# Patient Record
Sex: FEMALE | Race: WHITE | NOT HISPANIC OR LATINO | Employment: OTHER
[De-identification: names, ages, dates, MRNs, and addresses within clinical notes are randomized per-mention and may not be internally consistent; named-entity substitution may affect disease eponyms.]

---

## 2017-07-08 ENCOUNTER — HEALTH MAINTENANCE LETTER (OUTPATIENT)
Age: 62
End: 2017-07-08

## 2018-09-14 ENCOUNTER — TELEPHONE (OUTPATIENT)
Dept: OTHER | Facility: CLINIC | Age: 63
End: 2018-09-14

## 2018-11-26 ENCOUNTER — OFFICE VISIT (OUTPATIENT)
Dept: FAMILY MEDICINE | Facility: CLINIC | Age: 63
End: 2018-11-26
Payer: COMMERCIAL

## 2018-11-26 ENCOUNTER — RADIANT APPOINTMENT (OUTPATIENT)
Dept: GENERAL RADIOLOGY | Facility: CLINIC | Age: 63
End: 2018-11-26
Attending: NURSE PRACTITIONER
Payer: COMMERCIAL

## 2018-11-26 VITALS
BODY MASS INDEX: 26.05 KG/M2 | HEART RATE: 72 BPM | SYSTOLIC BLOOD PRESSURE: 134 MMHG | RESPIRATION RATE: 18 BRPM | TEMPERATURE: 98.5 F | HEIGHT: 63 IN | WEIGHT: 147 LBS | DIASTOLIC BLOOD PRESSURE: 80 MMHG

## 2018-11-26 DIAGNOSIS — Z00.00 ANNUAL PHYSICAL EXAM: Primary | ICD-10-CM

## 2018-11-26 DIAGNOSIS — M19.042 PRIMARY OSTEOARTHRITIS OF LEFT HAND: ICD-10-CM

## 2018-11-26 DIAGNOSIS — M25.541 ARTHRALGIA OF RIGHT HAND: ICD-10-CM

## 2018-11-26 LAB
ALBUMIN SERPL-MCNC: 3.7 G/DL (ref 3.4–5)
ALP SERPL-CCNC: 63 U/L (ref 40–150)
ALT SERPL W P-5'-P-CCNC: 21 U/L (ref 0–50)
ANION GAP SERPL CALCULATED.3IONS-SCNC: 8 MMOL/L (ref 3–14)
AST SERPL W P-5'-P-CCNC: 23 U/L (ref 0–45)
BILIRUB SERPL-MCNC: 0.6 MG/DL (ref 0.2–1.3)
BUN SERPL-MCNC: 8 MG/DL (ref 7–30)
CALCIUM SERPL-MCNC: 8.6 MG/DL (ref 8.5–10.1)
CHLORIDE SERPL-SCNC: 105 MMOL/L (ref 94–109)
CHOLEST SERPL-MCNC: 212 MG/DL
CO2 SERPL-SCNC: 26 MMOL/L (ref 20–32)
CREAT SERPL-MCNC: 0.62 MG/DL (ref 0.52–1.04)
GFR SERPL CREATININE-BSD FRML MDRD: >90 ML/MIN/1.7M2
GLUCOSE SERPL-MCNC: 99 MG/DL (ref 70–99)
HDLC SERPL-MCNC: 119 MG/DL
LDLC SERPL CALC-MCNC: 82 MG/DL
NONHDLC SERPL-MCNC: 93 MG/DL
POTASSIUM SERPL-SCNC: 3.7 MMOL/L (ref 3.4–5.3)
PROT SERPL-MCNC: 7.2 G/DL (ref 6.8–8.8)
SODIUM SERPL-SCNC: 139 MMOL/L (ref 133–144)
T4 FREE SERPL-MCNC: 0.93 NG/DL (ref 0.76–1.46)
TRIGL SERPL-MCNC: 55 MG/DL
TSH SERPL DL<=0.005 MIU/L-ACNC: 4.49 MU/L (ref 0.4–4)

## 2018-11-26 PROCEDURE — 84443 ASSAY THYROID STIM HORMONE: CPT | Performed by: NURSE PRACTITIONER

## 2018-11-26 PROCEDURE — G0124 SCREEN C/V THIN LAYER BY MD: HCPCS | Performed by: NURSE PRACTITIONER

## 2018-11-26 PROCEDURE — 73140 X-RAY EXAM OF FINGER(S): CPT | Mod: RT

## 2018-11-26 PROCEDURE — G0145 SCR C/V CYTO,THINLAYER,RESCR: HCPCS | Performed by: NURSE PRACTITIONER

## 2018-11-26 PROCEDURE — 80053 COMPREHEN METABOLIC PANEL: CPT | Performed by: NURSE PRACTITIONER

## 2018-11-26 PROCEDURE — 36415 COLL VENOUS BLD VENIPUNCTURE: CPT | Performed by: NURSE PRACTITIONER

## 2018-11-26 PROCEDURE — 80061 LIPID PANEL: CPT | Performed by: NURSE PRACTITIONER

## 2018-11-26 PROCEDURE — 84439 ASSAY OF FREE THYROXINE: CPT | Performed by: NURSE PRACTITIONER

## 2018-11-26 PROCEDURE — 87624 HPV HI-RISK TYP POOLED RSLT: CPT | Performed by: NURSE PRACTITIONER

## 2018-11-26 PROCEDURE — 99396 PREV VISIT EST AGE 40-64: CPT | Performed by: NURSE PRACTITIONER

## 2018-11-26 PROCEDURE — 99213 OFFICE O/P EST LOW 20 MIN: CPT | Mod: 25 | Performed by: NURSE PRACTITIONER

## 2018-11-26 ASSESSMENT — ENCOUNTER SYMPTOMS
EYE PAIN: 0
COUGH: 0
HEMATURIA: 0
DIZZINESS: 0
CONSTIPATION: 0
ABDOMINAL PAIN: 0
CHILLS: 0
HEMATOCHEZIA: 0
DIARRHEA: 0

## 2018-11-26 NOTE — MR AVS SNAPSHOT
After Visit Summary   11/26/2018    Levar Sesay    MRN: [id number]           Patient Information     Date Of Birth          1955        Visit Information        Provider Department      11/26/2018 9:00 AM Palak Mckenzie APRN [facility]        Today's Diagnoses     Arthralgia of right hand    -  1    Annual physical exam        Primary osteoarthritis of left hand          Care Instructions      Preventive Health Recommendations  Female Ages 50 - 64    Yearly exam: See your health care provider every year in order to  o Review health changes.   o Discuss preventive care.    o Review your medicines if your doctor has prescribed any.      Get a Pap test every three years (unless you have an abnormal result and your provider advises testing more often).    If you get Pap tests with HPV test, you only need to test every 5 years, unless you have an abnormal result.     You do not need a Pap test if your uterus was removed (hysterectomy) and you have not had cancer.    You should be tested each year for STDs (sexually transmitted diseases) if you're at risk.     Have a mammogram every 1 to 2 years.    Have a colonoscopy at age 50, or have a yearly FIT test (stool test). These exams screen for colon cancer.      Have a cholesterol test every 5 years, or more often if advised.    Have a diabetes test (fasting glucose) every three years. If you are at risk for diabetes, you should have this test more often.     If you are at risk for osteoporosis (brittle bone disease), think about having a bone density scan (DEXA).    Shots: Get a flu shot each year. Get a tetanus shot every 10 years.    Nutrition:     Eat at least 5 servings of fruits and vegetables each day.    Eat whole-grain bread, whole-wheat pasta and brown rice instead of white grains and rice.    Get adequate Calcium and Vitamin D.     Lifestyle    Exercise at least 150 minutes a week (30 minutes a day, 5 days a  week). This will help you control your weight and prevent disease.    Limit alcohol to one drink per day.    No smoking.     Wear sunscreen to prevent skin cancer.     See your dentist every six months for an exam and cleaning.    See your eye doctor every 1 to 2 years.            Follow-ups after your visit        Additional Services     ORTHO  REFERRAL       [facility] is referring you to the Orthopedic  Services at [facility].       The  Representative will assist you in the coordination of your Orthopedic and Musculoskeletal Care as prescribed by your physician.    The  Representative will call you within 1 business day to help schedule your appointment, or you may contact the  Representative at:    All areas ~ [phone]     Type of Referral : Non Surgical / Sport Medicine       Timeframe requested: 3 - 5 days    Coverage of these services is subject to the terms and limitations of your health insurance plan.  Please call member services at your health plan with any benefit or coverage questions.      If X-rays, CT or MRI's have been performed, please contact the facility where they were done to arrange for , prior to your scheduled appointment.  Please bring this referral request to your appointment and present it to your specialist.                  Who to contact     If you have questions or need follow up information about today's clinic visit or your schedule please contact [facility] directly at [phone].  Normal or non-critical lab and imaging results will be communicated to you by MyChart, letter or phone within 4 business days after the clinic has received the results. If you do not hear from us within 7 days, please contact the clinic through MyChart or phone. If you have a critical or abnormal lab result, we will notify you by phone as soon as possible.  Submit refill requests through  "[employer] or call your pharmacy and they will forward the refill request to us. Please allow 3 business days for your refill to be completed.          Additional Information About Your Visit        Care EveryWhere ID     This is your Care EveryWhere ID. This could be used by other organizations to access your [facility] medical records  [id number]        Your Vitals Were     Pulse Temperature Respirations Height Last Period BMI (Body Mass Index)    72 98.5  F (36.9  C) (Tympanic) 18 5' 3\" (1.6 m) 09/15/2003 26.04 kg/m2       Blood Pressure from Last 3 Encounters:   11/26/18 134/80   01/10/07 112/78   07/25/06 114/84    Weight from Last 3 Encounters:   11/26/18 147 lb (66.7 kg)   01/10/07 152 lb (68.9 kg)   07/25/06 160 lb 3.2 oz (72.7 kg)              We Performed the Following     Comprehensive metabolic panel     HPV High Risk Types DNA Cervical     Lipid Profile (Chol, Trig, HDL, LDL calc)     ORTHO  REFERRAL     Pap imaged thin layer screen with HPV - recommended age 30 - 65 years (select HPV order below)     TSH with free T4 reflex          Today's Medication Changes          These changes are accurate as of 11/26/18  9:51 AM.  If you have any questions, ask your nurse or doctor.               Stop taking these medicines if you haven't already. Please contact your care team if you have questions.     metroNIDAZOLE 0.75 % cream   Commonly known as:  METROCREAM   Stopped by:  Palak Mckenzie APRN CNP                    Primary Care Provider Office Phone # Fax #    SHYANNE Stallworth [phone] [address] [place] [address]        Equal Access to Services     [facility]: Hadjay jay Medrano, reymundo mercado, qajeanette shane. So [facility] [phone].    ATENCIÓN: Si habla español, tiene a toribio disposición servicios gratuitos de asistencia lingüística. Llame al [phone].    We comply with applicable federal " civil rights laws and [place] laws. We do not discriminate on the basis of race, color, national origin, age, disability, sex, sexual orientation, or gender identity.            Thank you!     Thank you for choosing [facility]  for your care. Our goal is always to provide you with excellent care. Hearing back from our patients is one way we can continue to improve our services. Please take a few minutes to complete the written survey that you may receive in the mail after your visit with us. Thank you!             Your Updated Medication List - Protect others around you: Learn how to safely use, store and throw away your medicines at www.disposemymeds.org.          This list is accurate as of 11/26/18  9:51 AM.  Always use your most recent med list.                   Brand Name Dispense Instructions for use Diagnosis    CRANBERRY CONCENTRATE PO           MULTIPLE VITAMIN PO      None Entered

## 2018-11-26 NOTE — LETTER
December 4, 2018    Levar Griffinon  [address] [facility] [address]      Dear ,      This letter is in regards to your recent cervical cancer screening (Pap smear and HPV test).    Your Pap smear result was reported as ASCUS or Atypical Squamous Cells of Undetermined Significance. This means that there were mildly abnormal cells found in the sample that we collected from your cervix. The vast majority of patients with this result do not have significant cervical abnormalities.     Your cervical sample was also tested for the presence of Human Papillomavirus (HPV). Your HPV test is NEGATIVE for high risk HPV, meaning that no HPV was found at this time.     Over time, your body can get rid of these abnormal cells, so it is recommended that you repeat your pap and HPV in 3 years.    If you have additional questions regarding this result, please call our registered nurse, Yue at [phone].    Please continue to be seen every year for an annual physical exam and other preventative tests.         Sincerely,    Palak Mckenzie, SHYANNE CNP/rlm

## 2018-11-26 NOTE — PROGRESS NOTES
SUBJECTIVE:   CC: Levar Sesay is an 63 year old woman who presents for preventive health visit.     Physical   Annual:     Getting at least 3 servings of Calcium per day:  NO    Bi-annual eye exam:  NO    Dental care twice a year:  NO    Sleep apnea or symptoms of sleep apnea:  None    Diet:  Regular (no restrictions)    Frequency of exercise:  2-3 days/week    Duration of exercise:  Other    Taking medications regularly:  Yes    Medication side effects:  Other    Additional concerns today:  Yes    Patient has a had a bump in her thumb since August and the joint is sore.      Today's PHQ-2 Score:   PHQ-2 ( 1999 [employer]) 11/26/2018   Q1: Little interest or pleasure in doing things 0   Q2: Feeling down, depressed or hopeless 0   PHQ-2 Score 0   Q1: Little interest or pleasure in doing things Not at all   Q2: Feeling down, depressed or hopeless Not at all   PHQ-2 Score 0       Abuse: Current or Past(Physical, Sexual or Emotional)- No  Do you feel safe in your environment - Yes    Social History   Substance Use Topics     Smoking status: Never Smoker     Smokeless tobacco: Not on file     Alcohol use Yes      Comment: weekends     Alcohol Use 11/26/2018   If you drink alcohol do you typically have greater than 3 drinks per day OR greater than 7 drinks per week? Yes   No flowsheet data found.    Reviewed orders with patient.  Reviewed health maintenance and updated orders accordingly - Yes  Labs reviewed in EPIC  BP Readings from Last 3 Encounters:   11/26/18 134/80   01/10/07 112/78   07/25/06 114/84    Wt Readings from Last 3 Encounters:   11/26/18 147 lb (66.7 kg)   01/10/07 152 lb (68.9 kg)   07/25/06 160 lb 3.2 oz (72.7 kg)                  Patient Active Problem List   Diagnosis     Postmenopausal atrophic vaginitis     MENOPAUSE MENOPAUSAL SYMPTOMS     Rosacea     Past Surgical History:   Procedure Laterality Date     SURGICAL HISTORY OF -   1961    Tonsillectomy     SURGICAL HISTORY OF -   03/31/1986          SURGICAL HISTORY OF -       D & C     SURGICAL HISTORY OF -   2005    carpal tunnel surgery       Social History   Substance Use Topics     Smoking status: Never Smoker     Smokeless tobacco: Never Used     Alcohol use Yes      Comment: weekends     Family History   Problem Relation Age of Onset     Hypertension Mother      Cancer Mother      BCC     Arthritis Mother      unspecified     Eye Disorder Mother      glaucoma, cataract     Cerebrovascular Disease Father      Cancer Father      lung     Respiratory Father      Cancer Maternal Grandmother      Uterine     Alcohol/Drug Maternal Grandfather      Cancer Paternal Grandmother      Uterine     Cancer Sister      Lung     Arthritis Sister      Thyroid Disease Other      Depression Sister          Current Outpatient Prescriptions   Medication Sig Dispense Refill     CRANBERRY CONCENTRATE PO        MULTIPLE VITAMIN OR None Entered       Allergies   Allergen Reactions     Sulfa Drugs      Sulfa       Patient over age 50, mutual decision to screen reflected in health maintenance.    Pertinent mammograms are reviewed under the imaging tab.  History of abnormal Pap smear: NO - age 30-65 PAP every 5 years with negative HPV co-testing recommended  PAP / HPV 2005   PAP NIL ASC-US(A)     Reviewed and updated as needed this visit by clinical staff         Reviewed and updated as needed this visit by Provider        Past Medical History:   Diagnosis Date     Abnormal glandular Papanicolaou smear of cervix 2003    ASCUS; negative HPV      Past Surgical History:   Procedure Laterality Date     SURGICAL HISTORY OF -       Tonsillectomy     SURGICAL HISTORY OF -   1986         SURGICAL HISTORY OF -       D & C     SURGICAL HISTORY OF -   2005    carpal tunnel surgery     Obstetric History     No data available          Review of Systems   Constitutional: Negative for chills.   HENT: Negative for congestion  "and ear pain.    Eyes: Negative for pain.   Respiratory: Negative for cough.    Cardiovascular: Negative for chest pain.   Gastrointestinal: Negative for abdominal pain, constipation, diarrhea and hematochezia.   Genitourinary: Negative for hematuria.   Neurological: Negative for dizziness.      OBJECTIVE:   LMP 09/15/2003                      /80 (BP Location: Right arm, Cuff Size: Adult Regular)  Pulse 72  Temp 98.5  F (36.9  C) (Tympanic)  Resp 18  Ht 5' 3\" (1.6 m)  Wt 147 lb (66.7 kg)  LMP 09/15/2003  BMI 26.04 kg/m2    Physical Exam  GENERAL: healthy, alert and no distress  EYES: Eyes grossly normal to inspection, PERRL and conjunctivae and sclerae normal  HENT: ear canals and TM's normal, nose and mouth without ulcers or lesions  NECK: no adenopathy, no asymmetry, masses, or scars and thyroid normal to palpation  RESP: lungs clear to auscultation - no rales, rhonchi or wheezes  CV: regular rate and rhythm, normal S1 S2, no S3 or S4, no murmur, click or rub, no peripheral edema and peripheral pulses strong  MS: no gross musculoskeletal defects noted, no edema  MS: Finger exam: Tenderness and swelling MPI joint thumb   SKIN: no suspicious lesions or rashes  NEURO: Normal strength and tone, mentation intact and speech normal  PSYCH: mentation appears normal, affect normal/bright    Results for orders placed or performed in visit on 11/26/18   Lipid Profile (Chol, Trig, HDL, LDL calc)   Result Value Ref Range    Cholesterol 212 (H) <200 mg/dL    Triglycerides 55 <150 mg/dL    HDL Cholesterol 119 >49 mg/dL    LDL Cholesterol Calculated 82 <100 mg/dL    Non HDL Cholesterol 93 <130 mg/dL   Comprehensive metabolic panel   Result Value Ref Range    Sodium 139 133 - 144 mmol/L    Potassium 3.7 3.4 - 5.3 mmol/L    Chloride 105 94 - 109 mmol/L    Carbon Dioxide 26 20 - 32 mmol/L    Anion Gap 8 3 - 14 mmol/L    Glucose 99 70 - 99 mg/dL    Urea Nitrogen 8 7 - 30 mg/dL    Creatinine 0.62 0.52 - 1.04 mg/dL    GFR " "Estimate >90 >60 mL/min/1.7m2    GFR Estimate If Black >90 >60 mL/min/1.7m2    Calcium 8.6 8.5 - 10.1 mg/dL    Bilirubin Total 0.6 0.2 - 1.3 mg/dL    Albumin 3.7 3.4 - 5.0 g/dL    Protein Total 7.2 6.8 - 8.8 g/dL    Alkaline Phosphatase 63 40 - 150 U/L    ALT 21 0 - 50 U/L    AST 23 0 - 45 U/L   TSH with free T4 reflex   Result Value Ref Range    TSH 4.49 (H) 0.40 - 4.00 mU/L   T4 free   Result Value Ref Range    T4 Free 0.93 0.76 - 1.46 ng/dL         ASSESSMENT/PLAN:   (Z00.00) Annual physical exam  (primary encounter diagnosis)  Comment:   Plan: MA Screening Digital Bilateral            (M25.541) Arthralgia of right hand  Comment:   Plan: XR Finger Right G/E 2 Views, Lipid Profile         (Chol, Trig, HDL, LDL calc), Comprehensive         metabolic panel, TSH with free T4 reflex, Pap         imaged thin layer screen with HPV - recommended        age 30 - 65 years (select HPV order below), HPV        High Risk Types DNA Cervical, T4 free, T4 free      (M19.042) Primary osteoarthritis of left hand  Comment:   Plan: ORTHO  REFERRAL          COUNSELING:  Reviewed preventive health counseling, as reflected in patient instructions       Regular exercise       Healthy diet/nutrition       Vision screening       Hearing screening       Contraception       Consider Hep C screening for patients born between 1945 and 1965       HIV screeninx in teen years, 1x in adult years, and at intervals if high risk    BP Readings from Last 1 Encounters:   01/10/07 112/78     Estimated body mass index is 26.5 kg/(m^2) as calculated from the following:    Height as of 1/10/07: 5' 3.5\" (1.613 m).    Weight as of 1/10/07: 152 lb (68.9 kg).      Weight management plan: Discussed healthy diet and exercise guidelines     reports that she has never smoked. She does not have any smokeless tobacco history on file.      Counseling Resources:  ATP IV Guidelines  Pooled Cohorts Equation Calculator  Breast Cancer Risk Calculator  FRAX " Risk Assessment  ICSI Preventive Guidelines  Dietary Guidelines for Americans, 2010  USDA's MyPlate  ASA Prophylaxis  Lung CA Screening    SHYANNE Stallworth CNP  [facility]

## 2018-11-28 NOTE — PROGRESS NOTES
Please Notify Levar  of test results TSH mild elevated would recommend recheck in 6 months.  Cholesterol levels within normal limits recommend repeat lipids in 1 year.          Palak Mckenzie CNP

## 2018-11-29 LAB
COPATH REPORT: ABNORMAL
PAP: ABNORMAL

## 2018-11-30 LAB
FINAL DIAGNOSIS: NORMAL
HPV HR 12 DNA CVX QL NAA+PROBE: NEGATIVE
HPV16 DNA SPEC QL NAA+PROBE: NEGATIVE
HPV18 DNA SPEC QL NAA+PROBE: NEGATIVE
SPECIMEN DESCRIPTION: NORMAL
SPECIMEN SOURCE CVX/VAG CYTO: NORMAL

## 2018-12-03 ENCOUNTER — OFFICE VISIT (OUTPATIENT)
Dept: ORTHOPEDICS | Facility: CLINIC | Age: 63
End: 2018-12-03
Payer: COMMERCIAL

## 2018-12-03 VITALS
DIASTOLIC BLOOD PRESSURE: 78 MMHG | BODY MASS INDEX: 26.05 KG/M2 | WEIGHT: 147 LBS | HEIGHT: 63 IN | SYSTOLIC BLOOD PRESSURE: 124 MMHG

## 2018-12-03 DIAGNOSIS — M65.311 TRIGGER FINGER OF RIGHT THUMB: ICD-10-CM

## 2018-12-03 DIAGNOSIS — M18.11 PRIMARY OSTEOARTHRITIS OF FIRST CARPOMETACARPAL JOINT OF RIGHT HAND: Primary | ICD-10-CM

## 2018-12-03 PROCEDURE — 20604 DRAIN/INJ JOINT/BURSA W/US: CPT | Mod: RT | Performed by: FAMILY MEDICINE

## 2018-12-03 PROCEDURE — 99203 OFFICE O/P NEW LOW 30 MIN: CPT | Mod: 25 | Performed by: FAMILY MEDICINE

## 2018-12-03 RX ORDER — ROPIVACAINE HYDROCHLORIDE 5 MG/ML
0.5 INJECTION, SOLUTION EPIDURAL; INFILTRATION; PERINEURAL
Status: DISCONTINUED | OUTPATIENT
Start: 2018-12-03 | End: 2020-05-11

## 2018-12-03 RX ORDER — BETAMETHASONE SODIUM PHOSPHATE AND BETAMETHASONE ACETATE 3; 3 MG/ML; MG/ML
6 INJECTION, SUSPENSION INTRA-ARTICULAR; INTRALESIONAL; INTRAMUSCULAR; SOFT TISSUE
Status: DISCONTINUED | OUTPATIENT
Start: 2018-12-03 | End: 2020-05-11

## 2018-12-03 RX ADMIN — BETAMETHASONE SODIUM PHOSPHATE AND BETAMETHASONE ACETATE 6 MG: 3; 3 INJECTION, SUSPENSION INTRA-ARTICULAR; INTRALESIONAL; INTRAMUSCULAR; SOFT TISSUE at 10:10

## 2018-12-03 RX ADMIN — ROPIVACAINE HYDROCHLORIDE 0.5 ML: 5 INJECTION, SOLUTION EPIDURAL; INFILTRATION; PERINEURAL at 10:10

## 2018-12-03 NOTE — PATIENT INSTRUCTIONS
[facility] Injection Discharge Instructions      You may shower, however avoid swimming, tub baths or hot tubs for 24 hours following your procedure    You may have a mild to moderate increase in pain for several days following the injection.    It may take up to 14 days for the steroid medication to start working although you may feel the effect as early as a few days after the procedure.    You may use ice packs for 10-15 minutes, 3 to 4 times a day at the injection site for comfort    You may use anti-inflammatory medications (such as Ibuprofen or Aleve or Advil) or Tylenol for pain control if necessary    If you were fasting, you may resume your normal diet and medications after the procedure    If you have diabetes, check your blood sugar more frequently than usual as your blood sugar may be higher than normal for 10-14 days following a steroid injection. Contact your doctor who manages your diabetes if your blood sugar is higher than usual      If you experience any of the following, call [facility] @ [phone] or [phone]  -Fever over 100 degree F  -Swelling, bleeding, redness, drainage, warmth at the injection site  - New or worsening pain        Understanding Carpometacarpal Osteoarthritis    The base of the thumb where it meets the hand is called the carpometacarpal (CMC) joint. This joint allows the thumb to move freely in many directions. It also provides strength so the hand can grasp and .  A smooth tissue called cartilage lines and cushions the bones of the CMC joint. Using the thumb puts stress on the joint. Over time, this can lead to the breakdown of the cartilage in the joint. This is known as osteoarthritis. With this condition, bones of the joint may be exposed and rub together. They may become irritated and rough. This keeps the joint from moving smoothly and can lead to pain.     How to say it  [id number]      What causes CMC joint osteoarthritis?    This type of osteoarthritis is  mainly caused by years of using the hand and thumb. The condition may be more likely if you:    Regularly do things that put great stress on the thumb joint    Have had previous thumb injuries    Have weakened or loose structures in the thumb    Are a woman who is past menopause  Symptoms of CMC joint osteoarthritis  Symptoms commonly include:    Thumb pain. It may get worse with pinching or gripping.    Thumb weakness    Sounds of grinding or popping in the thumb joint    Base of the thumb is enlarged   Treatment for CMC joint osteoarthritis  Osteoarthritis is a long-term (chronic) condition. Treatment focuses on managing symptoms. It may include:    Taking prescription or over-the-counter pain medicines to help reduce pain and swelling    Using a brace to rest and support the thumb joint    Using hot or cold therapy to help relieve pain    Learning and practicing ways to reduce stress on the thumb joint    Using devices that help protect the joint. These include jar openers, pen , and spring-action scissors.    Following a plan of physical therapy and exercises. This will help improve the flexibility and strength of the hand and thumb.    Getting shots of medicine into the joint to help relieve symptoms for a time  If these treatments don t do enough to relieve severe pain, you may need surgery. There are several different types of surgery. In general, the goal is to relieve pain and help you to be able to use the hand.     When to call your healthcare provider  Call your healthcare provider right away if you have any of these:    Fever of 100.4 F (38 C) or higher, or as directed    Symptoms that don t get better, or get worse    New symptoms   Date Last Reviewed: 3/10/2016    [id number] [employer]. [address] [place] [address], [place] [address]. All rights reserved. This information is not intended as a substitute for professional medical care. Always follow your healthcare professional's  instructions.        Treating Trigger Finger     The tendon sheath is opened to release the tendon. Once the tendon can move freely again, the finger can bend and straighten more normally.     Trigger finger occurs when the tissue inside your finger or thumb becomes inflamed. Mild cases can be treated without surgery. If the problem is severe, surgery may be needed. Your healthcare provider will talk with you about your options.  Nonsurgical treatment  For mild symptoms, your healthcare provider may have you rest the finger or thumb. You may also be told to take anti-inflammatory medicines. These include ibuprofen or aspirin. You may be given an injection of medicine in the base of the finger or thumb. This typically is a steroid, such as cortisone.  Surgery  If nonsurgical treatments don t ease your symptoms, you may need surgery. A tendon is a cordlike fiber that attaches muscle to bone and allows joints to bend. The tendon is surrounded by a protective cover called a sheath. During surgery, the sheath in your finger or thumb is opened to enlarge the space and release the swollen tendon. This allows the finger or thumb to bend and straighten normally. Surgery takes about 20 minutes. It can often be done using a local anesthetic. You may also be sedated. You will likely be able to go home the same day. Your hand will be wrapped in a soft bandage. You may need to wear a plaster splint for a short time to keep the finger or thumb still as it heals. The stitches will be removed in about 2 weeks. Your healthcare provider will talk with you about the risks and benefits of surgery.  Date Last Reviewed: 1/1/2018 2000-2018 [employer]. [address] [place] [address], [place] [id number]. All rights reserved. This information is not intended as a substitute for professional medical care. Always follow your healthcare professional's instructions.

## 2018-12-03 NOTE — LETTER
12/3/2018         RE: Levar Sesay  [id number] [facility] [address]        Dear Colleague,    Thank you for referring your patient, Levar Sesay, to the [facility]. Please see a copy of my visit note below.    ASSESSMENT & PLAN  Levar was seen today for pain.    Diagnoses and all orders for this visit:    Primary osteoarthritis of first carpometacarpal joint of right hand    Trigger finger of right thumb      Patient is a 63-year-old female with no significant past medical history presenting with a 6-month history of worsening chronic right thumb pain notably at the CMC joint.  There is some mild nonlocking palpable triggering in the thumb as well.  I feel like her pain is due to the arthritis versus a trigger thumb.  Given this a steroid injection at the CMC joint was completed with reporting significant improvement of symptoms.  We will have her continue to monitor her symptoms and follow-up if they do not improve or worsen.  Patient understands and agrees with plan.  -----    SUBJECTIVE  Levar Sesay is a/an 63 year old Right handed female who is seen in consultation at the request of  Palak Mckenzie C.N.P. for evaluation of right thumb pain. The patient is seen by themselves.    Onset: 6 month(s) ago. Reports insidious onset without acute precipitating event.. 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  She enjoys gardening and golfing, pain limits both these activities.  Location of Pain: right CMC thumb  Rating of Pain at worst: 8/10  Rating of Pain Currently: Sig improved  Worsened by: gripping, lifting, night pain   Better with: Ibuprofen   Treatments tried: rest/activity avoidance, ice, ibuprofen and carpal tunnel brace  Associated symptoms: no distal numbness or tingling; denies swelling or warmth  Orthopedic history: YES -  Relevant surgical  "history: YES - 2006 Carpal tunnel surgery.   Past Medical History:   Diagnosis Date     Abnormal glandular Papanicolaou smear of cervix 02/2003    ASCUS; negative HPV     ASCUS of cervix with negative high risk HPV 11/2018    plan to repeat Pap+HPV cotesting in 3 years (2021)     Social History     Social History     Marital status:      Spouse name: N/A     Number of children: N/A     Years of education: N/A     Social History Main Topics     Smoking status: Never Smoker     Smokeless tobacco: Never Used     Alcohol use Yes      Comment: weekends     Drug use: No     Sexual activity: Yes     Partners: Male     Other Topics Concern     None     Social History Narrative         Patient's past medical, surgical, social, and family histories were reviewed today and no changes are noted.    REVIEW OF SYSTEMS:  10 point ROS is negative other than symptoms noted above in HPI, Past Medical History or as stated below  Constitutional: NEGATIVE for fever, chills, change in weight  Skin: NEGATIVE for worrisome rashes, moles or lesions  GI/: NEGATIVE for bowel or bladder changes  Neuro: NEGATIVE for weakness, dizziness or paresthesias    OBJECTIVE:  /78  Ht 5' 3\" (1.6 m)  Wt 147 lb (66.7 kg)  LMP 09/15/2003  BMI 26.04 kg/m2   General: healthy, alert and in no distress  HEENT: no scleral icterus or conjunctival erythema  Skin: no suspicious lesions or rash. No jaundice.  CV: regular rhythm by palpation  Resp: normal respiratory effort without conversational dyspnea   Psych: normal mood and affect  Gait: normal steady gait with appropriate coordination and balance  Neuro: normal light touch sensory exam of the bilateral hands.    MSK:  RIGHT HAND  Inspection:    Squaring of right CMC joint otherwise no swelling or obvious deformity or asymmetry  Palpation:   Carpals: normal   Metacarpals: normal   Thumb: CMC, triggering    Fingers: normal  Range of Motion:    Limited flexion of DIP of thumb 2/2 pain, otherwise " full active flexion and extension at MCP, PIP, and DIP joints; normal finger cascade without malrotation.  Wrist pronation, supination, and ulnar/radial deviation normal.  Strength:     limited slightly by pain, extension full, flexion full, abduction full, adduction full, opposition limited slightly by pain  Special Tests:    Positive: CMC grind, palpable triggering thumb    Negative: Finkelstein's, flexor digitorum superficialis testing, flexor digitorum profundus testing    Independent visualization of the below image:  No results found for this or any previous visit (from the past 24 hour(s)).  Study Result   RIGHT FINGER TWO OR MORE VIEWS   11/26/2018 9:39 AM      HISTORY:  Arthralgia of right hand.     COMPARISON: None.           IMPRESSION: No acute fracture or dislocation. Osteoarthritis at the  thumb carpometacarpal joint and in the joints of the thumb.     CAIO EMERSON MD         Patient's conditions were thoroughly discussed during today's visit with greater than 50% of the visit spent counseling the patient with total time spent face-to-face with the patient being 30 minutes.    Carl Fernandes MD [facility]        Again, thank you for allowing me to participate in the care of your patient.        Sincerely,        Carl Fernandes MD

## 2018-12-03 NOTE — MR AVS SNAPSHOT
After Visit Summary   12/3/2018    Levar Sesay    MRN: [id number]           Patient Information     Date Of Birth          1955        Visit Information        Provider Department      12/3/2018 9:40 AM Carl Fernandes MD [facility] [place]        Today's Diagnoses     Primary osteoarthritis of first carpometacarpal joint of right hand    -  1    Trigger finger of right thumb          Care Instructions      FS Injection Discharge Instructions      You may shower, however avoid swimming, tub baths or hot tubs for 24 hours following your procedure    You may have a mild to moderate increase in pain for several days following the injection.    It may take up to 14 days for the steroid medication to start working although you may feel the effect as early as a few days after the procedure.    You may use ice packs for 10-15 minutes, 3 to 4 times a day at the injection site for comfort    You may use anti-inflammatory medications (such as Ibuprofen or Aleve or Advil) or Tylenol for pain control if necessary    If you were fasting, you may resume your normal diet and medications after the procedure    If you have diabetes, check your blood sugar more frequently than usual as your blood sugar may be higher than normal for 10-14 days following a steroid injection. Contact your doctor who manages your diabetes if your blood sugar is higher than usual      If you experience any of the following, call [facility] @ [phone] or [phone]  -Fever over 100 degree F  -Swelling, bleeding, redness, drainage, warmth at the injection site  - New or worsening pain        Understanding Carpometacarpal Osteoarthritis    The base of the thumb where it meets the hand is called the carpometacarpal (CMC) joint. This joint allows the thumb to move freely in many directions. It also provides strength so the hand can grasp and .  A smooth tissue called cartilage lines and cushions the  bones of the CMC joint. Using the thumb puts stress on the joint. Over time, this can lead to the breakdown of the cartilage in the joint. This is known as osteoarthritis. With this condition, bones of the joint may be exposed and rub together. They may become irritated and rough. This keeps the joint from moving smoothly and can lead to pain.     How to say it  [id number]      What causes CMC joint osteoarthritis?    This type of osteoarthritis is mainly caused by years of using the hand and thumb. The condition may be more likely if you:    Regularly do things that put great stress on the thumb joint    Have had previous thumb injuries    Have weakened or loose structures in the thumb    Are a woman who is past menopause  Symptoms of CMC joint osteoarthritis  Symptoms commonly include:    Thumb pain. It may get worse with pinching or gripping.    Thumb weakness    Sounds of grinding or popping in the thumb joint    Base of the thumb is enlarged   Treatment for CMC joint osteoarthritis  Osteoarthritis is a long-term (chronic) condition. Treatment focuses on managing symptoms. It may include:    Taking prescription or over-the-counter pain medicines to help reduce pain and swelling    Using a brace to rest and support the thumb joint    Using hot or cold therapy to help relieve pain    Learning and practicing ways to reduce stress on the thumb joint    Using devices that help protect the joint. These include jar openers, pen , and spring-action scissors.    Following a plan of physical therapy and exercises. This will help improve the flexibility and strength of the hand and thumb.    Getting shots of medicine into the joint to help relieve symptoms for a time  If these treatments don t do enough to relieve severe pain, you may need surgery. There are several different types of surgery. In general, the goal is to relieve pain and help you to be able to use the hand.     When to call your healthcare  provider  Call your healthcare provider right away if you have any of these:    Fever of 100.4 F (38 C) or higher, or as directed    Symptoms that don t get better, or get worse    New symptoms   Date Last Reviewed: 3/10/2016    6179-6440 [employer]. [address] [place] [address]. All rights reserved. This information is not intended as a substitute for professional medical care. Always follow your healthcare professional's instructions.        Treating Trigger Finger     The tendon sheath is opened to release the tendon. Once the tendon can move freely again, the finger can bend and straighten more normally.     Trigger finger occurs when the tissue inside your finger or thumb becomes inflamed. Mild cases can be treated without surgery. If the problem is severe, surgery may be needed. Your healthcare provider will talk with you about your options.  Nonsurgical treatment  For mild symptoms, your healthcare provider may have you rest the finger or thumb. You may also be told to take anti-inflammatory medicines. These include ibuprofen or aspirin. You may be given an injection of medicine in the base of the finger or thumb. This typically is a steroid, such as cortisone.  Surgery  If nonsurgical treatments don t ease your symptoms, you may need surgery. A tendon is a cordlike fiber that attaches muscle to bone and allows joints to bend. The tendon is surrounded by a protective cover called a sheath. During surgery, the sheath in your finger or thumb is opened to enlarge the space and release the swollen tendon. This allows the finger or thumb to bend and straighten normally. Surgery takes about 20 minutes. It can often be done using a local anesthetic. You may also be sedated. You will likely be able to go home the same day. Your hand will be wrapped in a soft bandage. You may need to wear a plaster splint for a short time to keep the finger or thumb still as it heals. The stitches will be  removed in about 2 weeks. Your healthcare provider will talk with you about the risks and benefits of surgery.  Date Last Reviewed: 1/1/2018 2000-2018 [employer]. [address] [facility], [place] [address]. All rights reserved. This information is not intended as a substitute for professional medical care. Always follow your healthcare professional's instructions.                Follow-ups after your visit        Follow-up notes from your care team     Return if symptoms worsen or fail to improve.      Your next 10 appointments already scheduled     Dec 12, 2018  8:30 AM CST   MA SCREENING DIGITAL BILATERAL with NBMA1   [facility] ([facility])    [address] [place] [id number]   [phone]           How do I prepare for my exam? (Food and drink instructions) No Food and Drink Restrictions.  How do I prepare for my exam? (Other instructions) Do not use any powder, lotion or deodorant under your arms or on your breast. If you do, we will ask you to remove it before your exam.  What should I wear: Wear comfortable, two-piece clothing.  How long does the exam take: Most scans will take 15 minutes.  What should I bring: Bring any previous mammograms from other facilities or have them mailed to the breast center.  Do I need a :  No  is needed.  What do I need to tell my doctor: If you have any allergies, tell your care team.  What should I do after the exam: No restrictions, You may resume normal activities.  What is this test: This test is an x-ray of the breast to look for breast disease. The breast is pressed between two plates to flatten and spread the tissue. An X-ray is taken of the breast from different angles.  Who should I call with questions: If you have any questions, please call the Imaging Department where you will have your exam. Directions, parking instructions, and other information is available on our website,  [email or web address].  Other information about my exam Three-dimensional (3D) mammograms are available at [facility] locations in [facility], [place], [place], [facility], [place], [facility] [place] and [place].  Health locations include [place] and the [facility] in [place].  Benefits of 3D mammograms include * Improved rate of cancer detection * Decreases your chance of having to go back for more tests, which means fewer: * \"False-positive\" results (This means that there is an abnormal area but it isn't cancer.) * Invasive testing procedures, such as a biopsy or surgery * Can provide clearer images of the breast if you have dense breast tissue.  *3D mammography is an optional exam that anyone can have with a 2D mammogram. It doesn't replace or take the place of a 2D mammogram. 2D mammograms remain an effective screening test for all women.  Not all insurance companies cover the cost of a 3D mammogram. Check with your insurance.              Who to contact     If you have questions or need follow up information about today's clinic visit or your schedule please contact [facility] directly at [phone].  Normal or non-critical lab and imaging results will be communicated to you by MyChart, letter or phone within 4 business days after the clinic has received the results. If you do not hear from us within 7 days, please contact the clinic through MyChart or phone. If you have a critical or abnormal lab result, we will notify you by phone as soon as possible.  Submit refill requests through [employer] or call your pharmacy and they will forward the refill request to us. Please allow 3 business days for your refill to be completed.          Additional Information About Your Visit        Care EveryWhere ID     This is your Care EveryWhere ID. This could be used by other organizations to access your [facility] medical records  [id number]        Your Vitals " "Were     Height Last Period BMI (Body Mass Index)             5' 3\" (1.6 m) 09/15/2003 26.04 kg/m2          Blood Pressure from Last 3 Encounters:   12/03/18 124/78   11/26/18 134/80   01/10/07 112/78    Weight from Last 3 Encounters:   12/03/18 147 lb (66.7 kg)   11/26/18 147 lb (66.7 kg)   01/10/07 152 lb (68.9 kg)              Today, you had the following     No orders found for display       Primary Care Provider Office Phone # Fax #    Palak Mckenzie, APRN [facility] [phone] [address] [address]        Equal Access to Services     ELIZABETH WHITAKER : Hadii mila lro Marcela, waaxda luqadaha, qaybta kaalmada adeegyada, jeanette summers . So [facility] [phone].    ATENCIÓN: Si habla español, tiene a toribio disposición servicios gratuitos de asistencia lingüística. Llame al [phone].    We comply with applicable federal civil rights laws and [place] laws. We do not discriminate on the basis of race, color, national origin, age, disability, sex, sexual orientation, or gender identity.            Thank you!     Thank you for choosing [facility]  for your care. Our goal is always to provide you with excellent care. Hearing back from our patients is one way we can continue to improve our services. Please take a few minutes to complete the written survey that you may receive in the mail after your visit with us. Thank you!             Your Updated Medication List - Protect others around you: Learn how to safely use, store and throw away your medicines at www.disposemymeds.org.          This list is accurate as of 12/3/18 10:11 AM.  Always use your most recent med list.                   Brand Name Dispense Instructions for use Diagnosis    CRANBERRY CONCENTRATE PO           MULTIPLE VITAMIN PO      None Entered          "

## 2018-12-03 NOTE — PROGRESS NOTES
ASSESSMENT & PLAN  Levar was seen today for pain.    Diagnoses and all orders for this visit:    Primary osteoarthritis of first carpometacarpal joint of right hand  -     Small Joint Injection/Arthrocentesis    Trigger finger of right thumb    Other orders  -     Cancel: Hand / Upper Extremity Injection/Arthrocentesis  -     Cancel: Small Joint Injection/Arthrocentesis      Patient is a 63-year-old female with no significant past medical history presenting with a 6-month history of worsening chronic right thumb pain notably at the CMC joint.  There is some mild nonlocking palpable triggering in the thumb as well.  I feel like her pain is due to the arthritis versus a trigger thumb.  Given this a steroid injection at the CMC joint was completed with reporting significant improvement of symptoms.  We will have her continue to monitor her symptoms and follow-up if they do not improve or worsen.  Patient understands and agrees with plan.  -----    SUBJECTIVE  Levar Sesay is a/an 63 year old Right handed female who is seen in consultation at the request of  Palak Mckenzie C.N.P. for evaluation of right thumb pain. The patient is seen by themselves.    Onset: 6 month(s) ago. Reports insidious onset without acute precipitating event.. 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  She enjoys gardening and golfing, pain limits both these activities.  Location of Pain: right CMC thumb  Rating of Pain at worst: 8/10  Rating of Pain Currently: Sig improved  Worsened by: gripping, lifting, night pain   Better with: Ibuprofen   Treatments tried: rest/activity avoidance, ice, ibuprofen and carpal tunnel brace  Associated symptoms: no distal numbness or tingling; denies swelling or warmth  Orthopedic history: YES -  Relevant surgical history: YES - 2006 Carpal tunnel surgery.   Past Medical History:   Diagnosis Date      "Abnormal glandular Papanicolaou smear of cervix 02/2003    ASCUS; negative HPV     ASCUS of cervix with negative high risk HPV 11/2018    plan to repeat Pap+HPV cotesting in 3 years (2021)     Social History     Social History     Marital status:      Spouse name: N/A     Number of children: N/A     Years of education: N/A     Social History Main Topics     Smoking status: Never Smoker     Smokeless tobacco: Never Used     Alcohol use Yes      Comment: weekends     Drug use: No     Sexual activity: Yes     Partners: Male     Other Topics Concern     None     Social History Narrative         Patient's past medical, surgical, social, and family histories were reviewed today and no changes are noted.    REVIEW OF SYSTEMS:  10 point ROS is negative other than symptoms noted above in HPI, Past Medical History or as stated below  Constitutional: NEGATIVE for fever, chills, change in weight  Skin: NEGATIVE for worrisome rashes, moles or lesions  GI/: NEGATIVE for bowel or bladder changes  Neuro: NEGATIVE for weakness, dizziness or paresthesias    OBJECTIVE:  /78  Ht 5' 3\" (1.6 m)  Wt 147 lb (66.7 kg)  LMP 09/15/2003  BMI 26.04 kg/m2   General: healthy, alert and in no distress  HEENT: no scleral icterus or conjunctival erythema  Skin: no suspicious lesions or rash. No jaundice.  CV: regular rhythm by palpation  Resp: normal respiratory effort without conversational dyspnea   Psych: normal mood and affect  Gait: normal steady gait with appropriate coordination and balance  Neuro: normal light touch sensory exam of the bilateral hands.    MSK:  RIGHT HAND  Inspection:    Squaring of right CMC joint otherwise no swelling or obvious deformity or asymmetry  Palpation:   Carpals: normal   Metacarpals: normal   Thumb: CMC, triggering    Fingers: normal  Range of Motion:    Limited flexion of DIP of thumb 2/2 pain, otherwise full active flexion and extension at MCP, PIP, and DIP joints; normal finger cascade " without malrotation.  Wrist pronation, supination, and ulnar/radial deviation normal.  Strength:     limited slightly by pain, extension full, flexion full, abduction full, adduction full, opposition limited slightly by pain  Special Tests:    Positive: CMC grind, palpable triggering thumb    Negative: Finkelstein's, flexor digitorum superficialis testing, flexor digitorum profundus testing    Independent visualization of the below image:  No results found for this or any previous visit (from the past 24 hour(s)).  Study Result   RIGHT FINGER TWO OR MORE VIEWS   11/26/2018 9:39 AM      HISTORY:  Arthralgia of right hand.     COMPARISON: None.           IMPRESSION: No acute fracture or dislocation. Osteoarthritis at the  thumb carpometacarpal joint and in the joints of the thumb.     CAIO EMERSON MD       Small Joint Injection/Arthrocentesis  Date/Time: 12/3/2018 10:10 AM  Performed by: CARL NAVARRO  Authorized by: CARL NAVARRO     Indications:  Pain and joint swelling  Needle Size:  27 G  Guidance: ultrasound    Approach:  Radial  Location:  Thumb  Site:  R thumb CMC  Medications:  6 mg betamethasone acet & sod phos 6 (3-3) MG/ML; 0.5 mL ropivacaine 5 MG/ML  Medications comment:  Actual amount of celestone used 0.5 mL  Outcome:  Tolerated well, no immediate complications  Procedure discussed: discussed risks, benefits, and alternatives    Consent Given by:  Patient  Timeout: timeout called immediately prior to procedure    Prep: patient was prepped and draped in usual sterile fashion     Patient reported significant improvement of pain after injection.  Aftercare instructions given to patient.  Plan to follow-up discussed as above.  Pt understands and agrees with the plan.    Carl Navarro                  Patient's conditions were thoroughly discussed during today's visit with greater than 50% of the visit spent counseling the patient with total time spent face-to-face with the patient being  30 minutes.    Carl Fernandes MD [facility]

## 2018-12-12 ENCOUNTER — ANCILLARY PROCEDURE (OUTPATIENT)
Dept: MAMMOGRAPHY | Facility: CLINIC | Age: 63
End: 2018-12-12
Attending: NURSE PRACTITIONER
Payer: COMMERCIAL

## 2018-12-12 DIAGNOSIS — Z00.00 ANNUAL PHYSICAL EXAM: ICD-10-CM

## 2018-12-12 PROCEDURE — 77067 SCR MAMMO BI INCL CAD: CPT | Mod: TC

## 2019-04-19 ENCOUNTER — OFFICE VISIT (OUTPATIENT)
Dept: ORTHOPEDICS | Facility: CLINIC | Age: 64
End: 2019-04-19
Payer: COMMERCIAL

## 2019-04-19 VITALS — DIASTOLIC BLOOD PRESSURE: 82 MMHG | HEIGHT: 63 IN | SYSTOLIC BLOOD PRESSURE: 130 MMHG | BODY MASS INDEX: 26.04 KG/M2

## 2019-04-19 DIAGNOSIS — M65.311 TRIGGER THUMB OF RIGHT HAND: Primary | ICD-10-CM

## 2019-04-19 PROCEDURE — 20550 NJX 1 TENDON SHEATH/LIGAMENT: CPT | Mod: RT | Performed by: FAMILY MEDICINE

## 2019-04-19 PROCEDURE — 99214 OFFICE O/P EST MOD 30 MIN: CPT | Mod: 25 | Performed by: FAMILY MEDICINE

## 2019-04-19 RX ORDER — BETAMETHASONE SODIUM PHOSPHATE AND BETAMETHASONE ACETATE 3; 3 MG/ML; MG/ML
6 INJECTION, SUSPENSION INTRA-ARTICULAR; INTRALESIONAL; INTRAMUSCULAR; SOFT TISSUE
Status: DISCONTINUED | OUTPATIENT
Start: 2019-04-19 | End: 2020-05-11

## 2019-04-19 RX ORDER — ROPIVACAINE HYDROCHLORIDE 5 MG/ML
0.5 INJECTION, SOLUTION EPIDURAL; INFILTRATION; PERINEURAL
Status: DISCONTINUED | OUTPATIENT
Start: 2019-04-19 | End: 2020-05-11

## 2019-04-19 RX ADMIN — ROPIVACAINE HYDROCHLORIDE 0.5 ML: 5 INJECTION, SOLUTION EPIDURAL; INFILTRATION; PERINEURAL at 12:01

## 2019-04-19 RX ADMIN — BETAMETHASONE SODIUM PHOSPHATE AND BETAMETHASONE ACETATE 6 MG: 3; 3 INJECTION, SUSPENSION INTRA-ARTICULAR; INTRALESIONAL; INTRAMUSCULAR; SOFT TISSUE at 12:01

## 2019-04-19 NOTE — PATIENT INSTRUCTIONS
Patient Education   [facility] Injection Discharge Instructions      You may shower, however avoid swimming, tub baths or hot tubs for 24 hours following your procedure    You may have a mild to moderate increase in pain for several days following the injection.    It may take up to 14 days for the steroid medication to start working although you may feel the effect as early as a few days after the procedure.    You may use ice packs for 10-15 minutes, 3 to 4 times a day at the injection site for comfort    You may use anti-inflammatory medications (such as Ibuprofen or Aleve or Advil) or Tylenol for pain control if necessary    If you were fasting, you may resume your normal diet and medications after the procedure    If you have diabetes, check your blood sugar more frequently than usual as your blood sugar may be higher than normal for 10-14 days following a steroid injection. Contact your doctor who manages your diabetes if your blood sugar is higher than usual      If you experience any of the following, call [facility] @ [phone] or [phone]  -Fever over 100 degree F  -Swelling, bleeding, redness, drainage, warmth at the injection site  - New or worsening pain        Treating Trigger Finger     The tendon sheath is opened to release the tendon. Once the tendon can move freely again, the finger can bend and straighten more normally.     Trigger finger occurs when the tissue inside your finger or thumb becomes inflamed. Mild cases can be treated without surgery. If the problem is severe, surgery may be needed. Your healthcare provider will talk with you about your options.  Nonsurgical treatment  For mild symptoms, your healthcare provider may have you rest the finger or thumb. You may also be told to take anti-inflammatory medicines. These include ibuprofen or aspirin. You may be given an injection of medicine in the base of the finger or thumb. This typically is a steroid, such as cortisone.  Surgery  If  nonsurgical treatments don t ease your symptoms, you may need surgery. A tendon is a cordlike fiber that attaches muscle to bone and allows joints to bend. The tendon is surrounded by a protective cover called a sheath. During surgery, the sheath in your finger or thumb is opened to enlarge the space and release the swollen tendon. This allows the finger or thumb to bend and straighten normally. Surgery takes about 20 minutes. It can often be done using a local anesthetic. You may also be sedated. You will likely be able to go home the same day. Your hand will be wrapped in a soft bandage. You may need to wear a plaster splint for a short time to keep the finger or thumb still as it heals. The stitches will be removed in about 2 weeks. Your healthcare provider will talk with you about the risks and benefits of surgery.  Date Last Reviewed: 1/1/2018 2000-2018 [employer]. [address] [place] [address], [place] [address]. All rights reserved. This information is not intended as a substitute for professional medical care. Always follow your healthcare professional's instructions.

## 2019-04-19 NOTE — PROGRESS NOTES
ASSESSMENT & PLAN  Levar was seen today for pain.    Diagnoses and all orders for this visit:    Trigger thumb of right hand      Patient is a 63-year-old female with no significant past medical history presenting with a 6-month history of worsening chronic right thumb pain notably at the CMC joint.  There is some mild nonlocking palpable triggering in the thumb as well.  Pain improved with steroid injection last visit but locking and limitations of flexion concerning for trigger thumb limiting function.  Given this a trigger thumb injection completed with improvement of ROM and volar sided pain at base of thumb.  Aftercares given plan to f/u in 2 weeks if pain/function not improved.  Pt understands and agrees with plan.     -----    SUBJECTIVE  Levar Sesay is a/an 63 year old Right handed female who is seen in consultation at the request of  Palak Mckenzie C.N.P. for evaluation of right thumb pain. The patient is seen by themselves.    Onset: 6 month(s) ago. Reports insidious onset without acute precipitating event.. 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  She enjoys gardening and golfing, pain limits both these activities.  Location of Pain: right CMC thumb  Rating of Pain at worst: 8/10  Rating of Pain Currently: Sig improved  Worsened by: gripping, lifting, night pain   Better with: Ibuprofen   Treatments tried: rest/activity avoidance, ice, ibuprofen and carpal tunnel brace  Associated symptoms: no distal numbness or tingling; denies swelling or warmth  Orthopedic history: YES -  Relevant surgical history: YES - 2006 Carpal tunnel surgery.     Interim History - April 19, 2019  Since last visit on 12/3/2018 patient has increased right thumb pain.  Right CMC injection on 12/3/18 provided good relief for several weeks. States that gripping is difficult due to weakness. No interim injury.       Past  "Medical History:   Diagnosis Date     Abnormal glandular Papanicolaou smear of cervix 02/2003    ASCUS; negative HPV     ASCUS of cervix with negative high risk HPV 11/2018    plan to repeat Pap+HPV cotesting in 3 years (2021)     Social History     Social History     Marital status:      Spouse name: N/A     Number of children: N/A     Years of education: N/A     Social History Main Topics     Smoking status: Never Smoker     Smokeless tobacco: Never Used     Alcohol use Yes      Comment: weekends     Drug use: No     Sexual activity: Yes     Partners: Male     Other Topics Concern     None     Social History Narrative         Patient's past medical, surgical, social, and family histories were reviewed today and no changes are noted.    REVIEW OF SYSTEMS:  10 point ROS is negative other than symptoms noted above in HPI, Past Medical History or as stated below  Constitutional: NEGATIVE for fever, chills, change in weight  Skin: NEGATIVE for worrisome rashes, moles or lesions  GI/: NEGATIVE for bowel or bladder changes  Neuro: NEGATIVE for weakness, dizziness or paresthesias    OBJECTIVE:  /82   Ht 1.6 m (5' 3\")   LMP 09/15/2003   BMI 26.04 kg/m     General: healthy, alert and in no distress  HEENT: no scleral icterus or conjunctival erythema  Skin: no suspicious lesions or rash. No jaundice.  CV: regular rhythm by palpation  Resp: normal respiratory effort without conversational dyspnea   Psych: normal mood and affect  Gait: normal steady gait with appropriate coordination and balance  Neuro: normal light touch sensory exam of the bilateral hands.    MSK:  RIGHT HAND  Inspection:    Squaring of right CMC joint otherwise no swelling or obvious deformity or asymmetry  Palpation:   Carpals: normal   Metacarpals: normal   Thumb: CMC, triggering limitations in flexion    Fingers: normal  Range of Motion:    Limited flexion of DIP of thumb 2/2 pain, otherwise full active flexion and extension at MCP, " PIP, and DIP joints; normal finger cascade without malrotation.    Strength:     limited slightly by pain, extension full, flexion full, abduction full, adduction full, opposition limited slightly by pain  Special Tests:    Positive: CMC grind, palpable triggering thumb    Negative: Finkelstein's, flexor digitorum superficialis testing, flexor digitorum profundus testing    Hand / Upper Extremity Injection/Arthrocentesis: R thumb A1  Date/Time: 4/19/2019 12:01 PM  Performed by: Carl Fernandes MD  Authorized by: Carl Fernandes MD     Indications:  Therapeutic  Needle Size:  27 G  Guidance: landmark    Approach:  Volar  Condition: trigger finger    Location:  Thumb            Site:  R thumb A1                          Medications:  6 mg betamethasone acet & sod phos 6 (3-3) MG/ML; 0.5 mL ropivacaine 5 MG/ML  Medications comment:  Actual amount of Celestone 0.5 mg  Procedure discussed: discussed risks, benefits, and alternatives    Consent Given by:  Patient  Timeout: timeout called immediately prior to procedure    Prep: patient was prepped and draped in usual sterile fashion     Patient reported significant improvement of pain after injection.  Aftercare instructions given to patient.  Plan to follow-up as discussed above.     Carl Fernandes MD [facility]          Independent visualization of the below image:  No results found for this or any previous visit (from the past 24 hour(s)).  Study Result   RIGHT FINGER TWO OR MORE VIEWS   11/26/2018 9:39 AM      HISTORY:  Arthralgia of right hand.     COMPARISON: None.           IMPRESSION: No acute fracture or dislocation. Osteoarthritis at the  thumb carpometacarpal joint and in the joints of the thumb.     CAIO EMERSON MD           Patient's conditions were thoroughly discussed during today's visit with greater than 50% of the visit spent counseling the patient with total time spent face-to-face with the patient being  25 minutes.    Carl Fernandes MD [facility]

## 2019-04-19 NOTE — LETTER
4/19/2019         RE: Levar Sesay  [address] [address] [address]        Dear Colleague,    Thank you for referring your patient, Levar Sesay, to the [facility]. Please see a copy of my visit note below.    ASSESSMENT & PLAN  There are no diagnoses linked to this encounter.  Patient is a 63-year-old female with no significant past medical history presenting with a 6-month history of worsening chronic right thumb pain notably at the CMC joint.  There is some mild nonlocking palpable triggering in the thumb as well.  Pain improved with steroid injection last visit but locking and limitations of flexion concerning for trigger thumb limiting function.  Given this a trigger thumb injection completed with improvement of ROM and volar sided pain at base of thumb.  Aftercares given plan to f/u in 2 weeks if pain/function not improved.  Pt understands and agrees with plan.     -----    SUBJECTIVE  Levar Sesay is a/an 63 year old Right handed female who is seen in consultation at the request of  Palak Mckenzie C.N.P. for evaluation of right thumb pain. The patient is seen by themselves.    Onset: 6 month(s) ago. Reports insidious onset without acute precipitating event.. 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  She enjoys gardening and golfing, pain limits both these activities.  Location of Pain: right CMC thumb  Rating of Pain at worst: 8/10  Rating of Pain Currently: Sig improved  Worsened by: gripping, lifting, night pain   Better with: Ibuprofen   Treatments tried: rest/activity avoidance, ice, ibuprofen and carpal tunnel brace  Associated symptoms: no distal numbness or tingling; denies swelling or warmth  Orthopedic history: YES -  Relevant surgical history: YES - 2006 Carpal tunnel surgery.     Interim History - April 19, 2019  Since last visit on  "12/3/2018 patient has increased right thumb pain.  Right CMC injection on 12/3/18 provided good relief for several weeks. States that gripping is difficult due to weakness. No interim injury.       Past Medical History:   Diagnosis Date     Abnormal glandular Papanicolaou smear of cervix 02/2003    ASCUS; negative HPV     ASCUS of cervix with negative high risk HPV 11/2018    plan to repeat Pap+HPV cotesting in 3 years (2021)     Social History     Social History     Marital status:      Spouse name: N/A     Number of children: N/A     Years of education: N/A     Social History Main Topics     Smoking status: Never Smoker     Smokeless tobacco: Never Used     Alcohol use Yes      Comment: weekends     Drug use: No     Sexual activity: Yes     Partners: Male     Other Topics Concern     None     Social History Narrative         Patient's past medical, surgical, social, and family histories were reviewed today and no changes are noted.    REVIEW OF SYSTEMS:  10 point ROS is negative other than symptoms noted above in HPI, Past Medical History or as stated below  Constitutional: NEGATIVE for fever, chills, change in weight  Skin: NEGATIVE for worrisome rashes, moles or lesions  GI/: NEGATIVE for bowel or bladder changes  Neuro: NEGATIVE for weakness, dizziness or paresthesias    OBJECTIVE:  Ht 1.6 m (5' 3\")   LMP 09/15/2003   BMI 26.04 kg/m      General: healthy, alert and in no distress  HEENT: no scleral icterus or conjunctival erythema  Skin: no suspicious lesions or rash. No jaundice.  CV: regular rhythm by palpation  Resp: normal respiratory effort without conversational dyspnea   Psych: normal mood and affect  Gait: normal steady gait with appropriate coordination and balance  Neuro: normal light touch sensory exam of the bilateral hands.    MSK:  RIGHT HAND  Inspection:    Squaring of right CMC joint otherwise no swelling or obvious deformity or asymmetry  Palpation:   Carpals: normal   Metacarpals: " normal   Thumb: CMC, triggering limitations in flexion    Fingers: normal  Range of Motion:    Limited flexion of DIP of thumb 2/2 pain, otherwise full active flexion and extension at MCP, PIP, and DIP joints; normal finger cascade without malrotation.    Strength:     limited slightly by pain, extension full, flexion full, abduction full, adduction full, opposition limited slightly by pain  Special Tests:    Positive: CMC grind, palpable triggering thumb    Negative: Finkelstein's, flexor digitorum superficialis testing, flexor digitorum profundus testing    Independent visualization of the below image:  No results found for this or any previous visit (from the past 24 hour(s)).  Study Result   RIGHT FINGER TWO OR MORE VIEWS   11/26/2018 9:39 AM      HISTORY:  Arthralgia of right hand.     COMPARISON: None.           IMPRESSION: No acute fracture or dislocation. Osteoarthritis at the  thumb carpometacarpal joint and in the joints of the thumb.     CAIO EMERSON MD           Patient's conditions were thoroughly discussed during today's visit with greater than 50% of the visit spent counseling the patient with total time spent face-to-face with the patient being 25 minutes.    Carl Fernandes MD [facility]        Again, thank you for allowing me to participate in the care of your patient.        Sincerely,        Carl Fernandes MD

## 2019-05-07 ENCOUNTER — OFFICE VISIT (OUTPATIENT)
Dept: FAMILY MEDICINE | Facility: CLINIC | Age: 64
End: 2019-05-07
Payer: COMMERCIAL

## 2019-05-07 VITALS
DIASTOLIC BLOOD PRESSURE: 68 MMHG | HEART RATE: 84 BPM | RESPIRATION RATE: 20 BRPM | OXYGEN SATURATION: 99 % | SYSTOLIC BLOOD PRESSURE: 134 MMHG | WEIGHT: 151.8 LBS | BODY MASS INDEX: 26.89 KG/M2 | TEMPERATURE: 98.7 F

## 2019-05-07 DIAGNOSIS — B00.1 COLD SORE: ICD-10-CM

## 2019-05-07 DIAGNOSIS — J30.2 SEASONAL ALLERGIC RHINITIS, UNSPECIFIED TRIGGER: Primary | ICD-10-CM

## 2019-05-07 DIAGNOSIS — J01.10 ACUTE FRONTAL SINUSITIS, RECURRENCE NOT SPECIFIED: ICD-10-CM

## 2019-05-07 DIAGNOSIS — J06.9 VIRAL UPPER RESPIRATORY TRACT INFECTION WITH COUGH: ICD-10-CM

## 2019-05-07 PROCEDURE — 99214 OFFICE O/P EST MOD 30 MIN: CPT | Performed by: PHYSICIAN ASSISTANT

## 2019-05-07 RX ORDER — FLUTICASONE PROPIONATE 50 MCG
1 SPRAY, SUSPENSION (ML) NASAL DAILY
Qty: 15.8 ML | Refills: 1 | Status: SHIPPED | OUTPATIENT
Start: 2019-05-07 | End: 2022-09-07

## 2019-05-07 RX ORDER — ACYCLOVIR 400 MG/1
400 TABLET ORAL EVERY 8 HOURS
Qty: 15 TABLET | Refills: 0 | Status: SHIPPED | OUTPATIENT
Start: 2019-05-07 | End: 2020-05-11

## 2019-05-07 RX ORDER — AZITHROMYCIN 250 MG/1
TABLET, FILM COATED ORAL
Qty: 6 TABLET | Refills: 0 | Status: SHIPPED | OUTPATIENT
Start: 2019-05-07 | End: 2020-05-11

## 2019-05-07 ASSESSMENT — ENCOUNTER SYMPTOMS
RHINORRHEA: 0
MUSCULOSKELETAL NEGATIVE: 1
PALPITATIONS: 0
FEVER: 0
SINUS PRESSURE: 1
ENDOCRINE NEGATIVE: 1
WEAKNESS: 0
COUGH: 0
ALLERGIC/IMMUNOLOGIC NEGATIVE: 1
HEADACHES: 0
NAUSEA: 0
LIGHT-HEADEDNESS: 0
ARTHRALGIAS: 0
VOMITING: 0
CARDIOVASCULAR NEGATIVE: 1
DIARRHEA: 0
CHILLS: 0
EYES NEGATIVE: 1
WOUND: 0
SHORTNESS OF BREATH: 0
HEMATOLOGIC/LYMPHATIC NEGATIVE: 1
DIZZINESS: 0
MYALGIAS: 0
SORE THROAT: 0
NECK STIFFNESS: 0
BRUISES/BLEEDS EASILY: 0
NECK PAIN: 0
SINUS PAIN: 1
JOINT SWELLING: 0
RESPIRATORY NEGATIVE: 1
BACK PAIN: 0

## 2019-05-07 NOTE — NURSING NOTE
"Chief Complaint   Patient presents with     Sinus Problem     Has been going on for 3 weeks.  Coughing all night, ear pain, congestion.  Also has some red spots on forehead.  Was taking Sudafed and no help     Derm Problem     Getting cold sores.  Has been using Abreva and not helping.  Valtrex helped in past.         Initial /68 (BP Location: Right arm, Patient Position: Chair, Cuff Size: Adult Regular)   Pulse 84   Temp 98.7  F (37.1  C) (Tympanic)   Resp 20   Wt 68.9 kg (151 lb 12.8 oz)   LMP 09/15/2003   SpO2 99%   BMI 26.89 kg/m   Estimated body mass index is 26.89 kg/m  as calculated from the following:    Height as of 4/19/19: 1.6 m (5' 3\").    Weight as of this encounter: 68.9 kg (151 lb 12.8 oz).    Patient presents to the clinic using No DME    Health Maintenance that is potentially due pending provider review:  NONE    n/a    Is there anyone who you would like to be able to receive your results? No  If yes have patient fill out SHANKAR    Zari Enciso M.A.      "

## 2019-05-07 NOTE — PROGRESS NOTES
Chief Complaint:     Chief Complaint   Patient presents with     Sinus Problem     Has been going on for 3 weeks.  Coughing all night, ear pain, congestion.  Also has some red spots on forehead.  Was taking Sudafed and no help     Derm Problem     Getting cold sores.  Has been using Abreva and not helping.  Valtrex helped in past.         HPI: Levar Sesay is an 63 year old female who presents with dry cough, nasal congestion and sinus pain. It began  3 day(s) ago and has gradually worsening.  She has been using sudafed and saline mist with no relief.  She also has a cold sore and is out of her medication for this. She denies any chest pain, shortness of breath or wheezing.    Recent travel?  no.      ROS:     Review of Systems   Constitutional: Negative for chills and fever.   HENT: Positive for congestion, sinus pressure and sinus pain. Negative for ear pain, rhinorrhea and sore throat.    Eyes: Negative.    Respiratory: Negative.  Negative for cough and shortness of breath.    Cardiovascular: Negative.  Negative for chest pain and palpitations.   Gastrointestinal: Negative for diarrhea, nausea and vomiting.   Endocrine: Negative.    Genitourinary: Negative.    Musculoskeletal: Negative.  Negative for arthralgias, back pain, joint swelling, myalgias, neck pain and neck stiffness.   Skin: Negative.  Negative for rash and wound.   Allergic/Immunologic: Negative.  Negative for immunocompromised state.   Neurological: Negative for dizziness, weakness, light-headedness and headaches.   Hematological: Negative.  Does not bruise/bleed easily.        Respiratory History  occasional episodes of bronchitis       Family History   Family History   Problem Relation Age of Onset     Hypertension Mother      Cancer Mother         BCC     Arthritis Mother         unspecified     Eye Disorder Mother         glaucoma, cataract     Cerebrovascular Disease Father      Cancer Father         lung     Respiratory Father       Cancer Maternal Grandmother         Uterine     Alcohol/Drug Maternal Grandfather      Cancer Paternal Grandmother         Uterine     Cancer Sister         Lung     Arthritis Sister      Thyroid Disease Other      Depression Sister         Problem history  Patient Active Problem List   Diagnosis     Postmenopausal atrophic vaginitis     MENOPAUSE MENOPAUSAL SYMPTOMS     Rosacea        Allergies  Allergies   Allergen Reactions     Sulfa Drugs      Sulfa        Social History  Social History     Socioeconomic History     Marital status:      Spouse name: Not on file     Number of children: Not on file     Years of education: Not on file     Highest education level: Not on file   Occupational History     Not on file   Social Needs     Financial resource strain: Not on file     Food insecurity:     Worry: Not on file     Inability: Not on file     Transportation needs:     Medical: Not on file     Non-medical: Not on file   Tobacco Use     Smoking status: Never Smoker     Smokeless tobacco: Never Used   Substance and Sexual Activity     Alcohol use: Yes     Comment: weekends     Drug use: No     Sexual activity: Yes     Partners: Male   Lifestyle     Physical activity:     Days per week: Not on file     Minutes per session: Not on file     Stress: Not on file   Relationships     Social connections:     Talks on phone: Not on file     Gets together: Not on file     Attends Rastafari service: Not on file     Active member of club or organization: Not on file     Attends meetings of clubs or organizations: Not on file     Relationship status: Not on file     Intimate partner violence:     Fear of current or ex partner: Not on file     Emotionally abused: Not on file     Physically abused: Not on file     Forced sexual activity: Not on file   Other Topics Concern     Parent/sibling w/ CABG, MI or angioplasty before 65F 55M? Not Asked   Social History Narrative     Not on file        Current Meds    Current  Outpatient Medications:      acyclovir (ZOVIRAX) 400 MG tablet, Take 1 tablet (400 mg) by mouth every 8 hours for 5 days, Disp: 15 tablet, Rfl: 0     azithromycin (ZITHROMAX) 250 MG tablet, Two tablets first day, then one tablet daily for four days., Disp: 6 tablet, Rfl: 0     CRANBERRY CONCENTRATE PO, , Disp: , Rfl:      fluticasone (FLONASE) 50 MCG/ACT nasal spray, Spray 1 spray into both nostrils daily, Disp: 15.8 mL, Rfl: 1     MULTIPLE VITAMIN OR, None Entered, Disp: , Rfl:     Current Facility-Administered Medications:      betamethasone acet & sod phos (CELESTONE) injection 6 mg, 6 mg, , , Carl Fernandes MD, 6 mg at 04/19/19 1201     betamethasone acet & sod phos (CELESTONE) injection 6 mg, 6 mg, , , Carl Fernandes MD, 6 mg at 12/03/18 1010     ropivacaine (NAROPIN) injection 0.5 mL, 0.5 mL, , , Carl Fernandes MD, 0.5 mL at 04/19/19 1201     ropivacaine (NAROPIN) injection 0.5 mL, 0.5 mL, , , Carl Fernandes MD, 0.5 mL at 12/03/18 1010        OBJECTIVE     Vital signs reviewed by Guido La  /68 (BP Location: Right arm, Patient Position: Chair, Cuff Size: Adult Regular)   Pulse 84   Temp 98.7  F (37.1  C) (Tympanic)   Resp 20   Wt 68.9 kg (151 lb 12.8 oz)   LMP 09/15/2003   SpO2 99%   BMI 26.89 kg/m       Physical Exam   Constitutional: She is oriented to person, place, and time. She appears well-developed and well-nourished. She is cooperative.  Non-toxic appearance. She does not have a sickly appearance. She does not appear ill. No distress.   HENT:   Head: Normocephalic and atraumatic.   Right Ear: Hearing, tympanic membrane, external ear and ear canal normal. Tympanic membrane is not perforated, not erythematous, not retracted and not bulging.   Left Ear: Hearing, tympanic membrane, external ear and ear canal normal. Tympanic membrane is not perforated, not erythematous, not retracted and not bulging.   Nose: Mucosal edema present. No rhinorrhea. Right sinus exhibits  maxillary sinus tenderness and frontal sinus tenderness. Left sinus exhibits maxillary sinus tenderness and frontal sinus tenderness.   Mouth/Throat: Mucous membranes are normal. Posterior oropharyngeal erythema present. No oropharyngeal exudate, posterior oropharyngeal edema or tonsillar abscesses. Tonsils are 0 on the right. Tonsils are 0 on the left. No tonsillar exudate.   Eyes: Pupils are equal, round, and reactive to light. EOM are normal. Right eye exhibits no discharge. Left eye exhibits no discharge.   Neck: Normal range of motion. Neck supple.   Cardiovascular: Normal rate, regular rhythm, normal heart sounds and intact distal pulses. Exam reveals no gallop and no friction rub.   No murmur heard.  Pulmonary/Chest: Effort normal and breath sounds normal. No respiratory distress. She has no decreased breath sounds. She has no wheezes. She has no rhonchi. She has no rales. She exhibits no tenderness.   Abdominal: Soft. Bowel sounds are normal. She exhibits no distension and no mass. There is no tenderness. There is no guarding.   Lymphadenopathy:     She has no cervical adenopathy.   Neurological: She is alert and oriented to person, place, and time. She has normal reflexes. No cranial nerve deficit.   Skin: Skin is warm and dry. She is not diaphoretic.   Psychiatric: She has a normal mood and affect. Her behavior is normal. Judgment and thought content normal.   Nursing note and vitals reviewed.        Labs:       Medical Decision Making:    Differential Diagnosis:  URI Adult/Peds:  Sinusitis and seasonal allergies.        ASSESSMENT    1. Seasonal allergic rhinitis, unspecified trigger    2. Acute frontal sinusitis, recurrence not specified    3. Cold sore    4. Viral upper respiratory tract infection with cough        PLAN    Patient presents with 3 weeks of dry cough, nasal congestion and sinus pain.  Patient is in no acute distress.  Temp is 98.7 in clinic today.  Lung sounds were clear and O2 sats at  99% on RA.  Imaging to rule out pneumonia is not indicated at this time.  With length of symptoms, Rx for Augmentin and Flonase today.  Rx for refill of her acyclovir.  Rest, Push fluids, vaporizer, elevation of head of bed.  Ibuprofen and or Tylenol for any fever or body aches.  Over the counter cough suppressant- PRN- as discussed.   If symptoms worsen, recheck immediately otherwise follow up with your PCP in 1 week if symptoms are not improving.  Worrisome symptoms discussed with instructions to go to the ED.  Patient verbalized understanding and agreed with this plan.         Guido La  5/7/2019, 11:53 AM

## 2020-05-11 ENCOUNTER — ANCILLARY PROCEDURE (OUTPATIENT)
Dept: GENERAL RADIOLOGY | Facility: CLINIC | Age: 65
End: 2020-05-11
Attending: PHYSICIAN ASSISTANT
Payer: COMMERCIAL

## 2020-05-11 ENCOUNTER — OFFICE VISIT (OUTPATIENT)
Dept: FAMILY MEDICINE | Facility: CLINIC | Age: 65
End: 2020-05-11
Payer: COMMERCIAL

## 2020-05-11 VITALS
BODY MASS INDEX: 26.91 KG/M2 | DIASTOLIC BLOOD PRESSURE: 86 MMHG | TEMPERATURE: 98.7 F | WEIGHT: 157.6 LBS | OXYGEN SATURATION: 100 % | SYSTOLIC BLOOD PRESSURE: 138 MMHG | RESPIRATION RATE: 18 BRPM | HEART RATE: 91 BPM | HEIGHT: 64 IN

## 2020-05-11 DIAGNOSIS — J30.9 ALLERGIC RHINITIS, UNSPECIFIED SEASONALITY, UNSPECIFIED TRIGGER: ICD-10-CM

## 2020-05-11 DIAGNOSIS — Z11.59 NEED FOR HEPATITIS C SCREENING TEST: ICD-10-CM

## 2020-05-11 DIAGNOSIS — Z11.4 ENCOUNTER FOR SCREENING FOR HIV: ICD-10-CM

## 2020-05-11 DIAGNOSIS — R59.0 ENLARGED LYMPH NODES IN ARMPIT: Primary | ICD-10-CM

## 2020-05-11 DIAGNOSIS — R59.0 ENLARGED LYMPH NODES IN ARMPIT: ICD-10-CM

## 2020-05-11 LAB
ERYTHROCYTE [DISTWIDTH] IN BLOOD BY AUTOMATED COUNT: 12.8 % (ref 10–15)
HCT VFR BLD AUTO: 44.8 % (ref 35–47)
HGB BLD-MCNC: 14.3 G/DL (ref 11.7–15.7)
MCH RBC QN AUTO: 31.6 PG (ref 26.5–33)
MCHC RBC AUTO-ENTMCNC: 31.9 G/DL (ref 31.5–36.5)
MCV RBC AUTO: 99 FL (ref 78–100)
PLATELET # BLD AUTO: 258 10E9/L (ref 150–450)
RBC # BLD AUTO: 4.53 10E12/L (ref 3.8–5.2)
WBC # BLD AUTO: 6.3 10E9/L (ref 4–11)

## 2020-05-11 PROCEDURE — 71046 X-RAY EXAM CHEST 2 VIEWS: CPT

## 2020-05-11 PROCEDURE — 86803 HEPATITIS C AB TEST: CPT | Performed by: PHYSICIAN ASSISTANT

## 2020-05-11 PROCEDURE — 85027 COMPLETE CBC AUTOMATED: CPT | Performed by: PHYSICIAN ASSISTANT

## 2020-05-11 PROCEDURE — 87389 HIV-1 AG W/HIV-1&-2 AB AG IA: CPT | Performed by: PHYSICIAN ASSISTANT

## 2020-05-11 PROCEDURE — 99214 OFFICE O/P EST MOD 30 MIN: CPT | Performed by: PHYSICIAN ASSISTANT

## 2020-05-11 PROCEDURE — 36415 COLL VENOUS BLD VENIPUNCTURE: CPT | Performed by: PHYSICIAN ASSISTANT

## 2020-05-11 ASSESSMENT — PAIN SCALES - GENERAL: PAINLEVEL: NO PAIN (0)

## 2020-05-11 ASSESSMENT — MIFFLIN-ST. JEOR: SCORE: 1246.93

## 2020-05-11 NOTE — NURSING NOTE
"Chief Complaint   Patient presents with     Sinus Problem     Lymphadenopathy       Initial /86   Pulse 91   Temp 98.7  F (37.1  C)   Resp 18   Ht 1.613 m (5' 3.5\")   Wt 71.5 kg (157 lb 9.6 oz)   LMP 09/15/2003   SpO2 100%   Breastfeeding No   BMI 27.48 kg/m   Estimated body mass index is 27.48 kg/m  as calculated from the following:    Height as of this encounter: 1.613 m (5' 3.5\").    Weight as of this encounter: 71.5 kg (157 lb 9.6 oz).    Patient presents to the clinic using No DME    Health Maintenance that is potentially due pending provider review:  NONE    n/a    Is there anyone who you would like to be able to receive your results? not asked  If yes have patient fill out SHANKAR    "

## 2020-05-11 NOTE — PROGRESS NOTES
Subjective     Levar Sesay is a 64 year old female who presents to clinic today for the following health issues:    HPI   ENT Symptoms             Symptoms: cc Present Absent Comment   Fever/Chills   x    Fatigue   x    Muscle Aches   x    Eye Irritation   x    Sneezing  x     Nasal Sonido/Drg  x     Sinus Pressure/Pain  x     Loss of smell   x    Dental pain  x  At first   Sore Throat   x    Swollen Glands  x  Under right arm-enlarging for 1-2 months   Ear Pain/Fullness   x    Cough   x    Wheeze   x    Chest Pain   x    Shortness of breath   x    Rash   x    Other   x Denies GI symptoms     Symptom duration:  since March (was in [place] then)   Symptom severity:  mild   Treatments tried:  sudafed, flonase, saline nasal, tylenol, mucinex are some help   Contacts:  unknown   Symptoms under control with her typical allergy medications.    Feels like her typical allergies.  Not concerning her.    Lymph node swelling    Duration: has had swelling of lymph node under right arm for years    Past 1-2 months seems to be getting larger    Description (location/character/radiation): lump under right arm    Intensity:  mild    Accompanying signs and symptoms: 0    History (similar episodes/previous evaluation): has had examined in past    Precipitating or alleviating factors: has had some allergy/sinus sx    Therapies tried and outcome: None   US in 2015.  Thinks node was 3-4 yrs before that.  About 1 in size previously, now larger.  Non tender.    No breast symptoms.  No cough or chest pains.   No rashes.  No fam history breast cancer but lung cancer.    BP Readings from Last 3 Encounters:   05/11/20 138/86   05/07/19 134/68   04/19/19 130/82    Wt Readings from Last 3 Encounters:   05/11/20 71.5 kg (157 lb 9.6 oz)   05/07/19 68.9 kg (151 lb 12.8 oz)   12/03/18 66.7 kg (147 lb)         Reviewed and updated as needed this visit by Provider  Tobacco  Allergies  Meds  Problems  Med Hx  Surg Hx  Fam Hx         Review  "of Systems   Constitutional, HEENT, cardiovascular, pulmonary, GI, , musculoskeletal, neuro, skin, endocrine and psych systems are negative, except as otherwise noted.      Objective    /86   Pulse 91   Temp 98.7  F (37.1  C)   Resp 18   Ht 1.613 m (5' 3.5\")   Wt 71.5 kg (157 lb 9.6 oz)   LMP 09/15/2003   SpO2 100%   Breastfeeding No   BMI 27.48 kg/m    Body mass index is 27.48 kg/m .  Physical Exam   GENERAL: healthy, alert and no distress  NECK: no adenopathy, no asymmetry, masses, or scars and thyroid normal to palpation  RESP: lungs clear to auscultation - no rales, rhonchi or wheezes  BREAST: R 1-2 o clock mass 4 cm x 5 cm, fairly symmetric moderate firmness slightly mobile, otherwise normal without masses, tenderness or nipple discharge and no palpable axillary masses or adenopathy  CV: regular rate and rhythm, normal S1 S2, no S3 or S4, no murmur, click or rub  SKIN: no suspicious lesions or rashes    Diagnostic Test Results:  Labs reviewed in Epic        Assessment & Plan       ICD-10-CM    1. Enlarged lymph nodes in armpit  R59.0 CBC with platelets     XR Chest 2 Views     MA Diagnostic Digital Bilateral     US Breast Right Limited 1-3 Quadrants     CANCELED: US Breast Right Limited 1-3 Quadrants   2. Allergic rhinitis, unspecified seasonality, unspecified trigger  J30.9    3. Need for hepatitis C screening test  Z11.59 Hepatitis C Screen Reflex to HCV RNA Quant and Genotype   4. Encounter for screening for HIV  Z11.4 HIV Antigen Antibody Combo       Patient Instructions   -Xray and lab today   -Set up breast mammogram/ultrasound in [place] for this week.  Call [phone] to set this up.    Continue with allergy meds since doing well        Return if symptoms worsen or fail to improve.    Kika Funez PA-C  [facility]      "

## 2020-05-11 NOTE — PATIENT INSTRUCTIONS
-Xray and lab today   -Set up breast mammogram/ultrasound in [place] for this week.  Call [phone] to set this up.    Continue with allergy meds since doing well

## 2020-05-12 LAB
HCV AB SERPL QL IA: NONREACTIVE
HIV 1+2 AB+HIV1 P24 AG SERPL QL IA: NONREACTIVE

## 2020-05-13 NOTE — RESULT ENCOUNTER NOTE
Please notify patient -- Chest xray looked fine.  All blood tests were fine.  Continue plan of breast imaging as we discussed.

## 2020-05-18 ENCOUNTER — HOSPITAL ENCOUNTER (OUTPATIENT)
Dept: MAMMOGRAPHY | Facility: CLINIC | Age: 65
End: 2020-05-18
Attending: PHYSICIAN ASSISTANT
Payer: COMMERCIAL

## 2020-05-18 ENCOUNTER — HOSPITAL ENCOUNTER (OUTPATIENT)
Dept: ULTRASOUND IMAGING | Facility: CLINIC | Age: 65
End: 2020-05-18
Attending: PHYSICIAN ASSISTANT
Payer: COMMERCIAL

## 2020-05-18 DIAGNOSIS — R59.0 ENLARGED LYMPH NODES IN ARMPIT: ICD-10-CM

## 2020-05-18 PROCEDURE — 76882 US LMTD JT/FCL EVL NVASC XTR: CPT | Mod: RT

## 2020-05-18 PROCEDURE — 77066 DX MAMMO INCL CAD BI: CPT

## 2020-05-20 NOTE — RESULT ENCOUNTER NOTE
Please notify patient -- Breast and lymph node imaging look ok.  Lymph node appears benign, however since this is enlarging, I did check with an MD who agrees we should still have the general surgeon give an opinion on whether to biopsy.  I placed a referral - [place] [phone].

## 2020-05-27 ENCOUNTER — OFFICE VISIT (OUTPATIENT)
Dept: SURGERY | Facility: CLINIC | Age: 65
End: 2020-05-27
Attending: PHYSICIAN ASSISTANT
Payer: COMMERCIAL

## 2020-05-27 VITALS
HEIGHT: 64 IN | SYSTOLIC BLOOD PRESSURE: 135 MMHG | HEART RATE: 76 BPM | WEIGHT: 157.63 LBS | BODY MASS INDEX: 26.91 KG/M2 | DIASTOLIC BLOOD PRESSURE: 71 MMHG | TEMPERATURE: 98.9 F

## 2020-05-27 DIAGNOSIS — R59.0 ENLARGED LYMPH NODES IN ARMPIT: ICD-10-CM

## 2020-05-27 PROCEDURE — 99243 OFF/OP CNSLTJ NEW/EST LOW 30: CPT | Performed by: SURGERY

## 2020-05-27 ASSESSMENT — MIFFLIN-ST. JEOR: SCORE: 1247.06

## 2020-05-27 NOTE — PROGRESS NOTES
64-year-old female complaining of right axillary lump for the past 5 years.  Patient reports this is enlarging slowly over time and was concerned.  She denies any symptoms.  No pain in the right breast or lumps.  No nipple discharge.  Patient was seen by her primary care provider and an ultrasound was done showing a benign finding.  No evidence of breast cancer.  Patient is here for follow-up.  Node is otherwise asymptomatic.    Patient Active Problem List   Diagnosis     Postmenopausal atrophic vaginitis     MENOPAUSE MENOPAUSAL SYMPTOMS     Rosacea       Past Medical History:   Diagnosis Date     Abnormal glandular Papanicolaou smear of cervix 2003    ASCUS; negative HPV     ASCUS of cervix with negative high risk HPV 2018    plan to repeat Pap+HPV cotesting in 3 years ()       Past Surgical History:   Procedure Laterality Date     SURGICAL HISTORY OF -       Tonsillectomy     SURGICAL HISTORY OF -   1986         SURGICAL HISTORY OF -       D & C     SURGICAL HISTORY OF -   2005    carpal tunnel surgery       Family History   Problem Relation Age of Onset     Hypertension Mother      Cancer Mother         BCC     Arthritis Mother         unspecified     Eye Disorder Mother         glaucoma, cataract     Cerebrovascular Disease Father      Cancer Father         lung  - smoked and in [employer]     Respiratory Father      Cancer Maternal Grandmother         Uterine     Alcohol/Drug Maternal Grandfather      Cancer Paternal Grandmother         Uterine     Cancer Half-Sister         Lung; smoked     Arthritis Half-Sister      Thyroid Disease Other      Depression Sister        Social History     Tobacco Use     Smoking status: Never Smoker     Smokeless tobacco: Never Used   Substance Use Topics     Alcohol use: Yes     Comment: weekends        History   Drug Use No       Current Outpatient Medications   Medication Sig Dispense Refill     calcium carbonate-vitamin D (OS-ABELINO) 500-400  MG-UNIT tablet Take 1 tablet by mouth daily       CRANBERRY CONCENTRATE PO        fluticasone (FLONASE) 50 MCG/ACT nasal spray Spray 1 spray into both nostrils daily 15.8 mL 1     MULTIPLE VITAMIN OR None Entered       Multiple Vitamins-Minerals (HAIR SKIN AND NAILS FORMULA PO)          Allergies   Allergen Reactions     Sulfa Drugs      Sulfa        Results for orders placed or performed during the hospital encounter of 05/18/20   US Axillary Right    Narrative    MA DIAGNOSTIC BILATERAL W/ FABY, US AXILLARY RIGHT 5/18/2020 12:49 PM    HISTORY:  Palpable lump in right axilla.    COMPARISON:  12/12/2018.    TECHNIQUE:  Bilateral digital mammography with CAD is performed as  well as DBT. Directed right axillary ultrasound is also done.    BREAST DENSITY: Scattered fibroglandular densities.    BILATERAL MAMMOGRAM: No suspicious findings malignancy.    RIGHT AXILLARY ULTRASOUND: Directed sonography to the right axilla is  remarkable for some benign-appearing lymph nodes wit large fatty shayna.  The largest one measures 1 cm in short axis dimension.      Impression    IMPRESSION: BI-RADS CATEGORY: 2 - Benign Finding(s).    RECOMMENDED FOLLOW-UP: Annual Mammography.    YANELI MCARTHUR MD     ROS  CV - No CP or SOB  Resp - No SOB or dyspnea  GI - No nausea or vomiting   - No difficulty with urination    Exam:  AXO3 NAD  Neuro - Strength 5/5 all major groups, sensation intact, PERRL  Lungs - CTA  Right axilla-palpable 2 cm node mobile and firm, nontender  CV - RRR  Abd - Soft, non-distended, non-tender, +BS  Extr - No edema    Assessment and plan: 64-year-old female with right axillary adenopathy.  This is likely a benign lymph node it is just enlarged.  Recommend a repeat ultrasound in 6 months.  If anything changes or the repeat ultrasound shows significant enlargement, would recommend a needle biopsy or possibly open removal.  At this time I just reassured patient that it is highly unlikely that this is cancer or anything  serious.  She will get a repeat ultrasound in 6 months.    Jeff Brice MD

## 2020-05-27 NOTE — LETTER
2020         RE: Levar Sesay  [address] [facility] [address]        Dear Colleague,    Thank you for referring your patient, Levar Sesay, to the [facility]. Please see a copy of my visit note below.    64-year-old female complaining of right axillary lump for the past 5 years.  Patient reports this is enlarging slowly over time and was concerned.  She denies any symptoms.  No pain in the right breast or lumps.  No nipple discharge.  Patient was seen by her primary care provider and an ultrasound was done showing a benign finding.  No evidence of breast cancer.  Patient is here for follow-up.  Node is otherwise asymptomatic.    Patient Active Problem List   Diagnosis     Postmenopausal atrophic vaginitis     MENOPAUSE MENOPAUSAL SYMPTOMS     Rosacea       Past Medical History:   Diagnosis Date     Abnormal glandular Papanicolaou smear of cervix 2003    ASCUS; negative HPV     ASCUS of cervix with negative high risk HPV 2018    plan to repeat Pap+HPV cotesting in 3 years ()       Past Surgical History:   Procedure Laterality Date     SURGICAL HISTORY OF -       Tonsillectomy     SURGICAL HISTORY OF -   1986         SURGICAL HISTORY OF -       D & C     SURGICAL HISTORY OF -   2005    carpal tunnel surgery       Family History   Problem Relation Age of Onset     Hypertension Mother      Cancer Mother         BCC     Arthritis Mother         unspecified     Eye Disorder Mother         glaucoma, cataract     Cerebrovascular Disease Father      Cancer Father         lung  - smoked and in [employer]     Respiratory Father      Cancer Maternal Grandmother         Uterine     Alcohol/Drug Maternal Grandfather      Cancer Paternal Grandmother         Uterine     Cancer Half-Sister         Lung; smoked     Arthritis Half-Sister      Thyroid Disease Other      Depression Sister        Social History     Tobacco Use     Smoking status: Never Smoker      Smokeless tobacco: Never Used   Substance Use Topics     Alcohol use: Yes     Comment: weekends        History   Drug Use No       Current Outpatient Medications   Medication Sig Dispense Refill     calcium carbonate-vitamin D (OS-ABELINO) 500-400 MG-UNIT tablet Take 1 tablet by mouth daily       CRANBERRY CONCENTRATE PO        fluticasone (FLONASE) 50 MCG/ACT nasal spray Spray 1 spray into both nostrils daily 15.8 mL 1     MULTIPLE VITAMIN OR None Entered       Multiple Vitamins-Minerals (HAIR SKIN AND NAILS FORMULA PO)          Allergies   Allergen Reactions     Sulfa Drugs      Sulfa        Results for orders placed or performed during the hospital encounter of 05/18/20   US Axillary Right    Narrative    MA DIAGNOSTIC BILATERAL W/ FABY, US AXILLARY RIGHT 5/18/2020 12:49 PM    HISTORY:  Palpable lump in right axilla.    COMPARISON:  12/12/2018.    TECHNIQUE:  Bilateral digital mammography with CAD is performed as  well as DBT. Directed right axillary ultrasound is also done.    BREAST DENSITY: Scattered fibroglandular densities.    BILATERAL MAMMOGRAM: No suspicious findings malignancy.    RIGHT AXILLARY ULTRASOUND: Directed sonography to the right axilla is  remarkable for some benign-appearing lymph nodes wit large fatty shayna.  The largest one measures 1 cm in short axis dimension.      Impression    IMPRESSION: BI-RADS CATEGORY: 2 - Benign Finding(s).    RECOMMENDED FOLLOW-UP: Annual Mammography.    YANELI MCARTHUR MD     ROS  CV - No CP or SOB  Resp - No SOB or dyspnea  GI - No nausea or vomiting   - No difficulty with urination    Exam:  AXO3 NAD  Neuro - Strength 5/5 all major groups, sensation intact, PERRL  Lungs - CTA  Right axilla-palpable 2 cm node mobile and firm, nontender  CV - RRR  Abd - Soft, non-distended, non-tender, +BS  Extr - No edema    Assessment and plan: 64-year-old female with right axillary adenopathy.  This is likely a benign lymph node it is just enlarged.  Recommend a repeat  ultrasound in 6 months.  If anything changes or the repeat ultrasound shows significant enlargement, would recommend a needle biopsy or possibly open removal.  At this time I just reassured patient that it is highly unlikely that this is cancer or anything serious.  She will get a repeat ultrasound in 6 months.    Jeff Brice MD     Again, thank you for allowing me to participate in the care of your patient.        Sincerely,        Jeff Brice MD

## 2020-05-27 NOTE — NURSING NOTE
"Initial /71 (BP Location: Right arm, Patient Position: Sitting, Cuff Size: Adult Large)   Pulse 76   Temp 98.9  F (37.2  C) (Tympanic)   Ht 1.613 m (5' 3.5\")   Wt 71.5 kg (157 lb 10.1 oz)   LMP 09/15/2003   BMI 27.48 kg/m   Estimated body mass index is 27.48 kg/m  as calculated from the following:    Height as of this encounter: 1.613 m (5' 3.5\").    Weight as of this encounter: 71.5 kg (157 lb 10.1 oz). .    Myesha Mcdonnell MA    "

## 2021-01-04 ENCOUNTER — OFFICE VISIT (OUTPATIENT)
Dept: ORTHOPEDICS | Facility: CLINIC | Age: 66
End: 2021-01-04
Payer: COMMERCIAL

## 2021-01-04 ENCOUNTER — ANCILLARY PROCEDURE (OUTPATIENT)
Dept: GENERAL RADIOLOGY | Facility: CLINIC | Age: 66
End: 2021-01-04
Attending: FAMILY MEDICINE
Payer: COMMERCIAL

## 2021-01-04 VITALS — BODY MASS INDEX: 27.48 KG/M2 | DIASTOLIC BLOOD PRESSURE: 86 MMHG | SYSTOLIC BLOOD PRESSURE: 128 MMHG | HEIGHT: 64 IN

## 2021-01-04 DIAGNOSIS — M79.645 BILATERAL THUMB PAIN: ICD-10-CM

## 2021-01-04 DIAGNOSIS — M79.644 BILATERAL THUMB PAIN: ICD-10-CM

## 2021-01-04 DIAGNOSIS — M18.0 OSTEOARTHRITIS OF CARPOMETACARPAL (CMC) JOINT OF BOTH THUMBS: Primary | ICD-10-CM

## 2021-01-04 PROCEDURE — 20604 DRAIN/INJ JOINT/BURSA W/US: CPT | Mod: 50 | Performed by: FAMILY MEDICINE

## 2021-01-04 PROCEDURE — 73140 X-RAY EXAM OF FINGER(S): CPT | Mod: RT | Performed by: RADIOLOGY

## 2021-01-04 PROCEDURE — 99213 OFFICE O/P EST LOW 20 MIN: CPT | Mod: 25 | Performed by: FAMILY MEDICINE

## 2021-01-04 RX ORDER — BETAMETHASONE SODIUM PHOSPHATE AND BETAMETHASONE ACETATE 3; 3 MG/ML; MG/ML
6 INJECTION, SUSPENSION INTRA-ARTICULAR; INTRALESIONAL; INTRAMUSCULAR; SOFT TISSUE
Status: DISCONTINUED | OUTPATIENT
Start: 2021-01-04 | End: 2023-12-28

## 2021-01-04 RX ORDER — ROPIVACAINE HYDROCHLORIDE 5 MG/ML
0.5 INJECTION, SOLUTION EPIDURAL; INFILTRATION; PERINEURAL
Status: DISCONTINUED | OUTPATIENT
Start: 2021-01-04 | End: 2023-12-28

## 2021-01-04 RX ADMIN — BETAMETHASONE SODIUM PHOSPHATE AND BETAMETHASONE ACETATE 6 MG: 3; 3 INJECTION, SUSPENSION INTRA-ARTICULAR; INTRALESIONAL; INTRAMUSCULAR; SOFT TISSUE at 07:31

## 2021-01-04 RX ADMIN — ROPIVACAINE HYDROCHLORIDE 0.5 ML: 5 INJECTION, SOLUTION EPIDURAL; INFILTRATION; PERINEURAL at 07:31

## 2021-01-04 NOTE — PROGRESS NOTES
ASSESSMENT & PLAN  Levar was seen today for pain and pain.    Diagnoses and all orders for this visit:    Osteoarthritis of carpometacarpal (CMC) joint of both thumbs  -     Small Joint Injection/Arthrocentesis: bilateral thumb CMC    Bilateral thumb pain  -     XR Finger Bilateral G/E 2 vw; Future  -     Small Joint Injection/Arthrocentesis: bilateral thumb CMC      Patient is a 63-year-old female with no significant past medical history presenting with a repeat 6-month history of worsening chronic right thumb and now left thumb pain notably at the CMC joints.  Patient had good relief from previous right CMC steroid injection and right trigger thumb resolved after sterile injection last year.  Patient having tenderness to palpation at the CMC joints bilaterally with positive grind test.  Repeat x-rays today showing moderate CMC osteoarthrosis likely cause of her pain.  Given this repeat injections were completed today.  Patient can brace as needed and follow-up for repeat injections as needed as well.    -----    SUBJECTIVE  Levar Sesay is a/an 63 year old Right handed female who is seen in consultation at the request of  Palak Mckenzie C.N.P. for evaluation of right thumb pain. The patient is seen by themselves.    Onset: 6 month(s) ago. Reports insidious onset without acute precipitating event.. 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  She enjoys gardening and golfing, pain limits both these activities.  Location of Pain: right CMC thumb  Rating of Pain at worst: 8/10  Rating of Pain Currently: Sig improved  Worsened by: gripping, lifting, night pain   Better with: Ibuprofen   Treatments tried: rest/activity avoidance, ice, ibuprofen and carpal tunnel brace  Associated symptoms: no distal numbness or tingling; denies swelling or warmth  Orthopedic history: YES -  Relevant surgical history: YES - 2006  "Carpal tunnel surgery.     Interim History - April 19, 2019  Since last visit on 12/3/2018 patient has increased right thumb pain.  Right CMC injection on 12/3/18 provided good relief for several weeks. States that gripping is difficult due to weakness. No interim injury.    Interim History - January 4, 2021  Since last visit on 4/19/19 patient has increased right CMC and now left CMC pain.  Right trigger finger injection performed on 4/19/19 provided good relief. Occasional numbness in thumbs, hx of carpal tunnel release on right side.  No interim injury.   Pain worsening after the holidays as well.  Pain bilaterally.  No significant triggering, just pain at CMC joint.  Pt wants repeat steroid injections.           Past Medical History:   Diagnosis Date     Abnormal glandular Papanicolaou smear of cervix 02/2003    ASCUS; negative HPV     ASCUS of cervix with negative high risk HPV 11/2018    plan to repeat Pap+HPV cotesting in 3 years (2021)     Social History     Social History     Marital status:      Spouse name: N/A     Number of children: N/A     Years of education: N/A     Social History Main Topics     Smoking status: Never Smoker     Smokeless tobacco: Never Used     Alcohol use Yes      Comment: weekends     Drug use: No     Sexual activity: Yes     Partners: Male     Other Topics Concern     None     Social History Narrative     Patient's past medical, surgical, social, and family histories were reviewed today and no changes are noted.  No family history pertinent to the patient's problem today     REVIEW OF SYSTEMS:  10 point ROS is negative other than symptoms noted above in HPI, Past Medical History or as stated below  Constitutional: NEGATIVE for fever, chills, change in weight  Skin: NEGATIVE for worrisome rashes, moles or lesions  GI/: NEGATIVE for bowel or bladder changes  Neuro: NEGATIVE for weakness, dizziness or paresthesias    OBJECTIVE:  /86   Ht 1.613 m (5' 3.5\")   LMP " 09/15/2003   BMI 27.48 kg/m     General: healthy, alert and in no distress  HEENT: no scleral icterus or conjunctival erythema  Skin: no suspicious lesions or rash. No jaundice.  CV: regular rhythm by palpation  Resp: normal respiratory effort without conversational dyspnea   Psych: normal mood and affect  Gait: normal steady gait with appropriate coordination and balance  Neuro: normal light touch sensory exam of the bilateral hands.    MSK:  Bilateral HAND  Inspection:    Squaring of right CMC joint otherwise no swelling or obvious deformity or asymmetry  Palpation:   Carpals: normal   Metacarpals: normal   Thumb: CMC, triggering limitations in flexion    Fingers: normal  Range of Motion:    Limited flexion of DIP of thumb 2/2 pain, otherwise full active flexion and extension at MCP, PIP, and DIP joints; normal finger cascade without malrotation.    Strength:     limited slightly by pain, extension full, flexion full, abduction full, adduction full, opposition limited slightly by pain  Special Tests:    Positive: CMC grind, palpable triggering thumb    Negative: Finkelstein's, flexor digitorum superficialis testing, flexor digitorum profundus testing    Small Joint Injection/Arthrocentesis: bilateral thumb CMC    Date/Time: 1/4/2021 7:31 AM  Performed by: Carl Fernandes MD  Authorized by: Carl Fernandes MD   Indications:  Pain  Needle Size:  25 G  Guidance: ultrasound     Approach:  Radial  Location:  Thumb  Laterality:  Bilateral  Site:  Bilateral thumb CMC    Medications (Right):  6 mg betamethasone acet & sod phos 6 (3-3) MG/ML; 0.5 mL ropivacaine 5 MG/ML   Medications (Right) comment:  Actual amount of celestone used 0.5 mL        Medications (Left):  6 mg betamethasone acet & sod phos 6 (3-3) MG/ML; 0.5 mL ropivacaine 5 MG/ML   Medications (Left) comment:  Actual amount of celestone used 0.5 mL                Outcome:  Tolerated well, no immediate complications  Procedure discussed: discussed  risks, benefits, and alternatives    Consent Given by:  Patient  Timeout: timeout called immediately prior to procedure    Prep: patient was prepped and draped in usual sterile fashion       Patient counseled on risks of infection related to steroids and COVID-19.  Patient reported significant improvement of pain after bilateral CMC steroid injections .  Aftercare instructions given to patient.  Plan to follow-up as discussed above.     Carl Fernandes MD [facility]              Independent visualization of the below image:  Recent Results (from the past 24 hour(s))   XR Finger Bilateral G/E 2 vw    Narrative    XR FINGER BILATERAL G/E 2 VW 1/4/2021 7:27 AM     HISTORY: Bilateral thumb pain.    COMPARISON: None.      Impression    IMPRESSION:     Right: Advanced narrowing with mild hypertrophic change and  subchondral cystic change in the first CMC joint. Mild hypertrophic  change in the IP joint.    Left: Advanced narrowing with mild hypertrophic change and subchondral  cystic change in the first CMC joint. Mild hypertrophic change in the  IP joint. Old healed fracture deformity of the distal radius.    TIKA BALBUENA MD       I independently  ordered, visualized and reviewed these images with the patient  Moderate bilateral CMC osteoarthrosis    Carl Fernandes MD [facility]

## 2021-01-04 NOTE — LETTER
1/4/2021         RE: Levar Sesay  [address] [facility] [address]        Dear Colleague,    Thank you for referring your patient, Levar Sesay, to the [facility] [place]. Please see a copy of my visit note below.    ASSESSMENT & PLAN  Levar was seen today for pain and pain.    Diagnoses and all orders for this visit:    Osteoarthritis of carpometacarpal (CMC) joint of both thumbs  -     Small Joint Injection/Arthrocentesis: bilateral thumb CMC    Bilateral thumb pain  -     XR Finger Bilateral G/E 2 vw; Future  -     Small Joint Injection/Arthrocentesis: bilateral thumb CMC      Patient is a 63-year-old female with no significant past medical history presenting with a repeat 6-month history of worsening chronic right thumb and now left thumb pain notably at the CMC joints.  Patient had good relief from previous right CMC steroid injection and right trigger thumb resolved after sterile injection last year.  Patient having tenderness to palpation at the CMC joints bilaterally with positive grind test.  Repeat x-rays today showing moderate CMC osteoarthrosis likely cause of her pain.  Given this repeat injections were completed today.  Patient can brace as needed and follow-up for repeat injections as needed as well.    -----    SUBJECTIVE  Levar Sesay is a/an 63 year old Right handed female who is seen in consultation at the request of  Palak Mckenzie C.N.P. for evaluation of right thumb pain. The patient is seen by themselves.    Onset: 6 month(s) ago. Reports insidious onset without acute precipitating event.. 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  She enjoys gardening and golfing, pain limits both these activities.  Location of Pain: right CMC thumb  Rating of Pain at worst: 8/10  Rating of Pain Currently: Sig improved  Worsened by: gripping, lifting,  night pain   Better with: Ibuprofen   Treatments tried: rest/activity avoidance, ice, ibuprofen and carpal tunnel brace  Associated symptoms: no distal numbness or tingling; denies swelling or warmth  Orthopedic history: YES -  Relevant surgical history: YES - 2006 Carpal tunnel surgery.     Interim History - April 19, 2019  Since last visit on 12/3/2018 patient has increased right thumb pain.  Right CMC injection on 12/3/18 provided good relief for several weeks. States that gripping is difficult due to weakness. No interim injury.    Interim History - January 4, 2021  Since last visit on 4/19/19 patient has increased right CMC and now left CMC pain.  Right trigger finger injection performed on 4/19/19 provided good relief. Occasional numbness in thumbs, hx of carpal tunnel release on right side.  No interim injury.   Pain worsening after the holidays as well.  Pain bilaterally.  No significant triggering, just pain at CMC joint.  Pt wants repeat steroid injections.           Past Medical History:   Diagnosis Date     Abnormal glandular Papanicolaou smear of cervix 02/2003    ASCUS; negative HPV     ASCUS of cervix with negative high risk HPV 11/2018    plan to repeat Pap+HPV cotesting in 3 years (2021)     Social History     Social History     Marital status:      Spouse name: N/A     Number of children: N/A     Years of education: N/A     Social History Main Topics     Smoking status: Never Smoker     Smokeless tobacco: Never Used     Alcohol use Yes      Comment: weekends     Drug use: No     Sexual activity: Yes     Partners: Male     Other Topics Concern     None     Social History Narrative     Patient's past medical, surgical, social, and family histories were reviewed today and no changes are noted.  No family history pertinent to the patient's problem today     REVIEW OF SYSTEMS:  10 point ROS is negative other than symptoms noted above in HPI, Past Medical History or as stated  "below  Constitutional: NEGATIVE for fever, chills, change in weight  Skin: NEGATIVE for worrisome rashes, moles or lesions  GI/: NEGATIVE for bowel or bladder changes  Neuro: NEGATIVE for weakness, dizziness or paresthesias    OBJECTIVE:  /86   Ht 1.613 m (5' 3.5\")   LMP 09/15/2003   BMI 27.48 kg/m     General: healthy, alert and in no distress  HEENT: no scleral icterus or conjunctival erythema  Skin: no suspicious lesions or rash. No jaundice.  CV: regular rhythm by palpation  Resp: normal respiratory effort without conversational dyspnea   Psych: normal mood and affect  Gait: normal steady gait with appropriate coordination and balance  Neuro: normal light touch sensory exam of the bilateral hands.    MSK:  Bilateral HAND  Inspection:    Squaring of right CMC joint otherwise no swelling or obvious deformity or asymmetry  Palpation:   Carpals: normal   Metacarpals: normal   Thumb: CMC, triggering limitations in flexion    Fingers: normal  Range of Motion:    Limited flexion of DIP of thumb 2/2 pain, otherwise full active flexion and extension at MCP, PIP, and DIP joints; normal finger cascade without malrotation.    Strength:     limited slightly by pain, extension full, flexion full, abduction full, adduction full, opposition limited slightly by pain  Special Tests:    Positive: CMC grind, palpable triggering thumb    Negative: Finkelstein's, flexor digitorum superficialis testing, flexor digitorum profundus testing    Small Joint Injection/Arthrocentesis: bilateral thumb CMC    Date/Time: 1/4/2021 7:31 AM  Performed by: Carl Fernandes MD  Authorized by: Carl Fernandes MD   Indications:  Pain  Needle Size:  25 G  Guidance: ultrasound     Approach:  Radial  Location:  Thumb  Laterality:  Bilateral  Site:  Bilateral thumb CMC    Medications (Right):  6 mg betamethasone acet & sod phos 6 (3-3) MG/ML; 0.5 mL ropivacaine 5 MG/ML   Medications (Right) comment:  Actual amount of celestone used " 0.5 mL        Medications (Left):  6 mg betamethasone acet & sod phos 6 (3-3) MG/ML; 0.5 mL ropivacaine 5 MG/ML   Medications (Left) comment:  Actual amount of celestone used 0.5 mL                Outcome:  Tolerated well, no immediate complications  Procedure discussed: discussed risks, benefits, and alternatives    Consent Given by:  Patient  Timeout: timeout called immediately prior to procedure    Prep: patient was prepped and draped in usual sterile fashion       Patient counseled on risks of infection related to steroids and COVID-19.  Patient reported significant improvement of pain after bilateral CMC steroid injections .  Aftercare instructions given to patient.  Plan to follow-up as discussed above.     Carl eFrnandes MD [facility]              Independent visualization of the below image:  Recent Results (from the past 24 hour(s))   XR Finger Bilateral G/E 2 vw    Narrative    XR FINGER BILATERAL G/E 2 VW 1/4/2021 7:27 AM     HISTORY: Bilateral thumb pain.    COMPARISON: None.      Impression    IMPRESSION:     Right: Advanced narrowing with mild hypertrophic change and  subchondral cystic change in the first CMC joint. Mild hypertrophic  change in the IP joint.    Left: Advanced narrowing with mild hypertrophic change and subchondral  cystic change in the first CMC joint. Mild hypertrophic change in the  IP joint. Old healed fracture deformity of the distal radius.    TIKA BALBUENA MD       I independently  ordered, visualized and reviewed these images with the patient  Moderate bilateral CMC osteoarthrosis    Carl Fernandes MD [facility]          Again, thank you for allowing me to participate in the care of your patient.        Sincerely,        Carl Fernandes MD

## 2021-01-04 NOTE — PATIENT INSTRUCTIONS
Diagnosis: Bilateral CMC arthrosis  Image Findings: moderate bilateral CMC arthrosis  Treatment: bilateral CMC steroid injections  Medications: Limited tylenol/ibuprofen for pain for 1-2 weeks  Follow-up: As needed can repeat injections    Please call [phone]   Ask for my team if you have any questions or concerns    It was great seeing you again today!    Carl Fernandes MD, CAQSM    [facility] Injection Discharge Instructions    Procedure: Bilateral CMC Steroid Injections      You may shower, however avoid swimming, tub baths or hot tubs for 24 hours following your procedure    You may have a mild to moderate increase in pain for several days following the injection.    It may take up to 14 days for the steroid medication to start working although you may feel the effect as early as a few days after the procedure.    You may use ice packs for 10-15 minutes, 3 to 4 times a day at the injection site for comfort    You may use anti-inflammatory medications (such as Ibuprofen or Aleve or Advil) or Tylenol for pain control if necessary    If you were fasting, you may resume your normal diet and medications after the procedure    If you have diabetes, check your blood sugar more frequently than usual as your blood sugar may be higher than normal for 10-14 days following a steroid injection. Contact your doctor who manages your diabetes if your blood sugar is higher than usual      If you experience any of the following, call [facility] @ [phone] or [phone]  -Fever over 100 degree F  -Swelling, bleeding, redness, drainage, warmth at the injection site  - New or worsening pain

## 2021-12-23 ENCOUNTER — OFFICE VISIT (OUTPATIENT)
Dept: FAMILY MEDICINE | Facility: CLINIC | Age: 66
End: 2021-12-23
Payer: COMMERCIAL

## 2021-12-23 VITALS
HEART RATE: 84 BPM | RESPIRATION RATE: 16 BRPM | TEMPERATURE: 99.6 F | HEIGHT: 63 IN | BODY MASS INDEX: 25.69 KG/M2 | SYSTOLIC BLOOD PRESSURE: 168 MMHG | DIASTOLIC BLOOD PRESSURE: 100 MMHG | WEIGHT: 145 LBS

## 2021-12-23 DIAGNOSIS — Z00.00 ENCOUNTER FOR MEDICARE ANNUAL WELLNESS EXAM: Primary | ICD-10-CM

## 2021-12-23 DIAGNOSIS — R59.0 ENLARGED LYMPH NODES IN ARMPIT: ICD-10-CM

## 2021-12-23 DIAGNOSIS — R22.41 MASS OF RIGHT FOOT: ICD-10-CM

## 2021-12-23 DIAGNOSIS — Z13.6 CARDIOVASCULAR SCREENING; LDL GOAL LESS THAN 160: ICD-10-CM

## 2021-12-23 DIAGNOSIS — R21 RASH AND NONSPECIFIC SKIN ERUPTION: ICD-10-CM

## 2021-12-23 DIAGNOSIS — L65.9 HAIR LOSS: ICD-10-CM

## 2021-12-23 LAB
ALBUMIN SERPL-MCNC: 3.5 G/DL (ref 3.4–5)
ALP SERPL-CCNC: 85 U/L (ref 40–150)
ALT SERPL W P-5'-P-CCNC: 21 U/L (ref 0–50)
ANION GAP SERPL CALCULATED.3IONS-SCNC: 3 MMOL/L (ref 3–14)
AST SERPL W P-5'-P-CCNC: 24 U/L (ref 0–45)
BILIRUB SERPL-MCNC: 0.6 MG/DL (ref 0.2–1.3)
BUN SERPL-MCNC: 10 MG/DL (ref 7–30)
CALCIUM SERPL-MCNC: 9.2 MG/DL (ref 8.5–10.1)
CHLORIDE BLD-SCNC: 106 MMOL/L (ref 94–109)
CHOLEST SERPL-MCNC: 208 MG/DL
CO2 SERPL-SCNC: 29 MMOL/L (ref 20–32)
CREAT SERPL-MCNC: 0.53 MG/DL (ref 0.52–1.04)
ERYTHROCYTE [DISTWIDTH] IN BLOOD BY AUTOMATED COUNT: 12.4 % (ref 10–15)
FASTING STATUS PATIENT QL REPORTED: NO
GFR SERPL CREATININE-BSD FRML MDRD: >90 ML/MIN/1.73M2
GLUCOSE BLD-MCNC: 101 MG/DL (ref 70–99)
HCT VFR BLD AUTO: 41.8 % (ref 35–47)
HDLC SERPL-MCNC: 111 MG/DL
HGB BLD-MCNC: 13.8 G/DL (ref 11.7–15.7)
LDLC SERPL CALC-MCNC: 86 MG/DL
MCH RBC QN AUTO: 31 PG (ref 26.5–33)
MCHC RBC AUTO-ENTMCNC: 33 G/DL (ref 31.5–36.5)
MCV RBC AUTO: 94 FL (ref 78–100)
NONHDLC SERPL-MCNC: 97 MG/DL
PLATELET # BLD AUTO: 244 10E3/UL (ref 150–450)
POTASSIUM BLD-SCNC: 4.3 MMOL/L (ref 3.4–5.3)
PROT SERPL-MCNC: 7.9 G/DL (ref 6.8–8.8)
RBC # BLD AUTO: 4.45 10E6/UL (ref 3.8–5.2)
SODIUM SERPL-SCNC: 138 MMOL/L (ref 133–144)
TRIGL SERPL-MCNC: 55 MG/DL
TSH SERPL DL<=0.005 MIU/L-ACNC: 2.54 MU/L (ref 0.4–4)
WBC # BLD AUTO: 6.3 10E3/UL (ref 4–11)

## 2021-12-23 PROCEDURE — 84443 ASSAY THYROID STIM HORMONE: CPT | Performed by: NURSE PRACTITIONER

## 2021-12-23 PROCEDURE — 85027 COMPLETE CBC AUTOMATED: CPT | Performed by: NURSE PRACTITIONER

## 2021-12-23 PROCEDURE — 80061 LIPID PANEL: CPT | Performed by: NURSE PRACTITIONER

## 2021-12-23 PROCEDURE — 36415 COLL VENOUS BLD VENIPUNCTURE: CPT | Performed by: NURSE PRACTITIONER

## 2021-12-23 PROCEDURE — G0402 INITIAL PREVENTIVE EXAM: HCPCS | Performed by: NURSE PRACTITIONER

## 2021-12-23 PROCEDURE — 80053 COMPREHEN METABOLIC PANEL: CPT | Performed by: NURSE PRACTITIONER

## 2021-12-23 PROCEDURE — 99214 OFFICE O/P EST MOD 30 MIN: CPT | Mod: 25 | Performed by: NURSE PRACTITIONER

## 2021-12-23 RX ORDER — TRIAMCINOLONE ACETONIDE 1 MG/G
OINTMENT TOPICAL 2 TIMES DAILY
Qty: 30 G | Refills: 1 | Status: SHIPPED | OUTPATIENT
Start: 2021-12-23 | End: 2023-12-28

## 2021-12-23 ASSESSMENT — ACTIVITIES OF DAILY LIVING (ADL): CURRENT_FUNCTION: NO ASSISTANCE NEEDED

## 2021-12-23 ASSESSMENT — MIFFLIN-ST. JEOR: SCORE: 1171.85

## 2021-12-23 NOTE — LETTER
December 23, 2021      Levar Sesay  [address] [facility] [address]        Dear ,    We are writing to inform you of your test results.    labs all looked good.  Overall nothing concerning.       Resulted Orders   TSH with free T4 reflex   Result Value Ref Range    TSH 2.54 0.40 - 4.00 mU/L   Lipid panel reflex to direct LDL Fasting   Result Value Ref Range    Cholesterol 208 (H) <200 mg/dL    Triglycerides 55 <150 mg/dL    Direct Measure  >=50 mg/dL    LDL Cholesterol Calculated 86 <=100 mg/dL    Non HDL Cholesterol 97 <130 mg/dL    Patient Fasting > 8hrs? No     Narrative    Cholesterol  Desirable:  <200 mg/dL    Triglycerides  Normal:  Less than 150 mg/dL  Borderline High:  150-199 mg/dL  High:  200-499 mg/dL  Very High:  Greater than or equal to 500 mg/dL    Direct Measure HDL  Female:  Greater than or equal to 50 mg/dL   Male:  Greater than or equal to 40 mg/dL    LDL Cholesterol  Desirable:  <100mg/dL  Above Desirable:  100-129 mg/dL   Borderline High:  130-159 mg/dL   High:  160-189 mg/dL   Very High:  >= 190 mg/dL    Non HDL Cholesterol  Desirable:  130 mg/dL  Above Desirable:  130-159 mg/dL  Borderline High:  160-189 mg/dL  High:  190-219 mg/dL  Very High:  Greater than or equal to 220 mg/dL   Comprehensive metabolic panel (BMP + Alb, Alk Phos, ALT, AST, Total. Bili, TP)   Result Value Ref Range    Sodium 138 133 - 144 mmol/L    Potassium 4.3 3.4 - 5.3 mmol/L    Chloride 106 94 - 109 mmol/L    Carbon Dioxide (CO2) 29 20 - 32 mmol/L    Anion Gap 3 3 - 14 mmol/L    Urea Nitrogen 10 7 - 30 mg/dL    Creatinine 0.53 0.52 - 1.04 mg/dL    Calcium 9.2 8.5 - 10.1 mg/dL    Glucose 101 (H) 70 - 99 mg/dL    Alkaline Phosphatase 85 40 - 150 U/L    AST 24 0 - 45 U/L    ALT 21 0 - 50 U/L    Protein Total 7.9 6.8 - 8.8 g/dL    Albumin 3.5 3.4 - 5.0 g/dL    Bilirubin Total 0.6 0.2 - 1.3 mg/dL    GFR Estimate >90 >60 mL/min/1.73m2      Comment:      Effective December 21, 2021 eGFRcr in  adults is calculated using the 2021 CKD-EPI creatinine equation which includes age and gender (Radha graff al., [place], DOI: 10.1056/XMEBwl7721721)   CBC with platelets   Result Value Ref Range    WBC Count 6.3 4.0 - 11.0 10e3/uL    RBC Count 4.45 3.80 - 5.20 10e6/uL    Hemoglobin 13.8 11.7 - 15.7 g/dL    Hematocrit 41.8 35.0 - 47.0 %    MCV 94 78 - 100 fL    MCH 31.0 26.5 - 33.0 pg    MCHC 33.0 31.5 - 36.5 g/dL    RDW 12.4 10.0 - 15.0 %    Platelet Count 244 150 - 450 10e3/uL       If you have any questions or concerns, please call the clinic at the number listed above.       Sincerely,      SHYANNE Kirkland CNP/dw

## 2021-12-23 NOTE — PROGRESS NOTES
"SUBJECTIVE:   Levar Sesay is a 66 year old female who presents for Preventive Visit.    Patient has been advised of split billing requirements and indicates understanding: Yes     Are you in the first 12 months of your Medicare coverage?  Yes,  Visual Acuity:  Right Eye: 20/40   Left Eye: 20/40  Both Eyes: 20/40    Healthy Habits:    In general, how would you rate your overall health?  Good    Frequency of exercise:  2-3 days/week    Duration of exercise:  30-45 minutes    Do you usually eat at least 4 servings of fruit and vegetables a day, include whole grains    & fiber and avoid regularly eating high fat or \"junk\" foods?  No    Taking medications regularly:  Not Applicable    Barriers to taking medications:  Not applicable    Medication side effects:  Not applicable    Ability to successfully perform activities of daily living:  No assistance needed    Home Safety:  No safety concerns identified    Hearing Impairment:  No hearing concerns    In the past 6 months, have you been bothered by leaking of urine?  No    In general, how would you rate your overall mental or emotional health?  Good      PHQ-2 Total Score:    Additional concerns today:  Yes    Do you feel safe in your environment? Yes    Have you ever done Advance Care Planning? (For example, a Health Directive, POLST, or a discussion with a medical provider or your loved ones about your wishes): No, advance care planning information given to patient to review.  Patient plans to discuss their wishes with loved ones or provider.         Fall risk  Fallen 2 or more times in the past year?: No  Any fall with injury in the past year?: No    Cognitive Screening   1) Repeat 3 items (Leader, Season, Table)    2) Clock draw: NORMAL  3) 3 item recall: Recalls 2 objects   Results: NORMAL clock, 1-2 items recalled: COGNITIVE IMPAIRMENT LESS LIKELY    Mini-CogTM Copyright S Alexa. Licensed by the author for use in [facility]; reprinted with " "permission ([email or web address]). All rights reserved.      Reviewed and updated as needed this visit by clinical staff  Tobacco  Allergies  Meds  Problems  Med Hx  Surg Hx  Fam Hx  Soc Hx         Reviewed and updated as needed this visit by Provider   Allergies  Meds  Problems           Social History     Tobacco Use     Smoking status: Never Smoker     Smokeless tobacco: Never Used   Substance Use Topics     Alcohol use: Yes     Comment: weekends         Alcohol Use 11/26/2018   Prescreen: >3 drinks/day or >7 drinks/week? Yes   Prescreen: >3 drinks/day or >7 drinks/week? -     Right Foot - Has lump on right bottom foot. Has has this for awhile. States she will have pain that goes up her lag as well. Hx of knee surgery and a \"tumor\" on her bone in knee.     Derm - on left hand. Since April 2021. Has used cortizone   Is asymptomatic, no itching     Mammo - states shes has a lump in right armpit for a long time. States she needs a mammo q 6 months with US.       Current providers sharing in care for this patient include:   Patient Care Team:  Palak Mckenzie APRN CNP as PCP - General (Nurse Practitioner - Family)  Carl Fernandes MD as Assigned Musculoskeletal Provider  Kika Funez PA-C as Assigned PCP    The following health maintenance items are reviewed in Epic and correct as of today:  Health Maintenance Due   Topic Date Due     DEXA  Never done     ANNUAL REVIEW OF HM ORDERS  Never done     ZOSTER IMMUNIZATION (1 of 2) Never done     INFLUENZA VACCINE (1) 09/01/2021     FALL RISK ASSESSMENT  Never done     Pneumococcal Vaccine: 65+ Years (1 of 1 - PPSV23) 09/22/2021     COVID-19 Vaccine (2 - Moderna 3-dose series) 12/29/2021     Any new diagnosis of family breast, ovarian, or bowel cancer? No    FHS-7: No flowsheet data found.    Mammogram Screening: Recommended mammography every 1-2 years with patient discussion and risk factor consideration  Pertinent mammograms are reviewed under the " "imaging tab.    Review of Systems  Constitutional, HEENT, cardiovascular, pulmonary, gi and gu systems are negative, except as otherwise noted.    OBJECTIVE:   BP (!) 168/100 (Cuff Size: Adult Regular)   Pulse 84   Temp 99.6  F (37.6  C) (Tympanic)   Resp 16   Ht 1.6 m (5' 3\")   Wt 65.8 kg (145 lb)   LMP 09/15/2003   BMI 25.69 kg/m   Estimated body mass index is 25.69 kg/m  as calculated from the following:    Height as of this encounter: 1.6 m (5' 3\").    Weight as of this encounter: 65.8 kg (145 lb).  Physical Exam  GENERAL APPEARANCE: healthy, alert and no distress  EYES: Eyes grossly normal to inspection, PERRL and conjunctivae and sclerae normal  HENT: ear canals and TM's normal, nose and mouth without ulcers or lesions, oropharynx clear and oral mucous membranes moist  NECK: no adenopathy, no asymmetry, masses, or scars and thyroid normal to palpation  RESP: lungs clear to auscultation - no rales, rhonchi or wheezes  BREAST: normal without masses, tenderness or nipple discharge and firm moveable mass right axilla  CV: regular rate and rhythm, normal S1 S2, no S3 or S4, no murmur, click or rub, no peripheral edema and peripheral pulses strong  ABDOMEN: soft, nontender, no hepatosplenomegaly, no masses and bowel sounds normal  MS: soft tender mass right medial foot  SKIN: erythematous patch present left hand at thumb   NEURO: Normal strength and tone, sensory exam grossly normal, mentation intact and speech normal  PSYCH: mentation appears normal and affect normal/bright    Diagnostic Test Results:  Results for orders placed or performed in visit on 12/23/21   Lipid panel reflex to direct LDL Fasting     Status: Abnormal   Result Value Ref Range    Cholesterol 208 (H) <200 mg/dL    Triglycerides 55 <150 mg/dL    Direct Measure  >=50 mg/dL    LDL Cholesterol Calculated 86 <=100 mg/dL    Non HDL Cholesterol 97 <130 mg/dL    Patient Fasting > 8hrs? No     Narrative    Cholesterol  Desirable:  <200 " mg/dL    Triglycerides  Normal:  Less than 150 mg/dL  Borderline High:  150-199 mg/dL  High:  200-499 mg/dL  Very High:  Greater than or equal to 500 mg/dL    Direct Measure HDL  Female:  Greater than or equal to 50 mg/dL   Male:  Greater than or equal to 40 mg/dL    LDL Cholesterol  Desirable:  <100mg/dL  Above Desirable:  100-129 mg/dL   Borderline High:  130-159 mg/dL   High:  160-189 mg/dL   Very High:  >= 190 mg/dL    Non HDL Cholesterol  Desirable:  130 mg/dL  Above Desirable:  130-159 mg/dL  Borderline High:  160-189 mg/dL  High:  190-219 mg/dL  Very High:  Greater than or equal to 220 mg/dL   Comprehensive metabolic panel (BMP + Alb, Alk Phos, ALT, AST, Total. Bili, TP)     Status: Abnormal   Result Value Ref Range    Sodium 138 133 - 144 mmol/L    Potassium 4.3 3.4 - 5.3 mmol/L    Chloride 106 94 - 109 mmol/L    Carbon Dioxide (CO2) 29 20 - 32 mmol/L    Anion Gap 3 3 - 14 mmol/L    Urea Nitrogen 10 7 - 30 mg/dL    Creatinine 0.53 0.52 - 1.04 mg/dL    Calcium 9.2 8.5 - 10.1 mg/dL    Glucose 101 (H) 70 - 99 mg/dL    Alkaline Phosphatase 85 40 - 150 U/L    AST 24 0 - 45 U/L    ALT 21 0 - 50 U/L    Protein Total 7.9 6.8 - 8.8 g/dL    Albumin 3.5 3.4 - 5.0 g/dL    Bilirubin Total 0.6 0.2 - 1.3 mg/dL    GFR Estimate >90 >60 mL/min/1.73m2   CBC with platelets     Status: Normal   Result Value Ref Range    WBC Count 6.3 4.0 - 11.0 10e3/uL    RBC Count 4.45 3.80 - 5.20 10e6/uL    Hemoglobin 13.8 11.7 - 15.7 g/dL    Hematocrit 41.8 35.0 - 47.0 %    MCV 94 78 - 100 fL    MCH 31.0 26.5 - 33.0 pg    MCHC 33.0 31.5 - 36.5 g/dL    RDW 12.4 10.0 - 15.0 %    Platelet Count 244 150 - 450 10e3/uL       ASSESSMENT / PLAN:   (Z00.00) Encounter for Medicare annual wellness exam  (primary encounter diagnosis)    (R59.0) Enlarged lymph nodes in armpit  Comment: with ongoing symptoms will do mammogram and ultrasound for further evaluation.    Plan: MA Diagnostic Digital Bilateral, US Axillary         Right, CBC with platelets        "   (R21) Rash and nonspecific skin eruption  Comment: appears to be some type of dermatitis, Kenalog sent to the pharmacy for symptoms.  If no improvement should see dermatology, referral placed.  Plan: Adult Dermatology Referral, triamcinolone         (KENALOG) 0.1 % external ointment          (L65.9) Hair loss  Comment: labs pending.   Plan: TSH with free T4 reflex, Comprehensive         metabolic panel (BMP + Alb, Alk Phos, ALT, AST,        Total. Bili, TP), CBC with platelets          (R22.41) Mass of right foot  Comment: referral to ortho for ongoing symptoms  Plan: Orthopedic  Referral          (Z13.6) CARDIOVASCULAR SCREENING; LDL GOAL LESS THAN 160  Comment: labs pending  Plan: Lipid panel reflex to direct LDL Fasting         Patient has been advised of split billing requirements and indicates understanding: Yes  COUNSELING:  Reviewed preventive health counseling, as reflected in patient instructions    Estimated body mass index is 25.69 kg/m  as calculated from the following:    Height as of this encounter: 1.6 m (5' 3\").    Weight as of this encounter: 65.8 kg (145 lb).        She reports that she has never smoked. She has never used smokeless tobacco.      Appropriate preventive services were discussed with this patient, including applicable screening as appropriate for cardiovascular disease, diabetes, osteopenia/osteoporosis, and glaucoma.  As appropriate for age/gender, discussed screening for colorectal cancer, prostate cancer, breast cancer, and cervical cancer. Checklist reviewing preventive services available has been given to the patient.    Reviewed patients plan of care and provided an AVS. The Basic Care Plan (routine screening as documented in Health Maintenance) for Levar meets the Care Plan requirement. This Care Plan has been established and reviewed with the Patient.    Counseling Resources:  ATP IV Guidelines  Pooled Cohorts Equation Calculator  Breast Cancer Risk " Calculator  Breast Cancer: Medication to Reduce Risk  FRAX Risk Assessment  ICSI Preventive Guidelines  Dietary Guidelines for Americans, 2010  USDA's MyPlate  ASA Prophylaxis  Lung CA Screening    SHYANNE Kirkland CNP  [facility]    Identified Health Risks:    The patient was counseled and encouraged to consider modifying their diet and eating habits. She was provided with information on recommended healthy diet options.

## 2021-12-23 NOTE — PATIENT INSTRUCTIONS
Please call [phone] to schedule your mammogram and ultrasound    Labs today -we will notify you with those results    Try the Kenalog to the rash, if no improvement see dermatology    Podiatry referral placed for your food      Patient Education   Personalized Prevention Plan  You are due for the preventive services outlined below.  Your care team is available to assist you in scheduling these services.  If you have already completed any of these items, please share that information with your care team to update in your medical record.  Health Maintenance Due   Topic Date Due     Osteoporosis Screening  Never done     ANNUAL REVIEW OF HM ORDERS  Never done     Zoster (Shingles) Vaccine (1 of 2) Never done     Flu Vaccine (1) 09/01/2021     FALL RISK ASSESSMENT  Never done     Pneumococcal Vaccine (1 of 1 - PPSV23) 09/22/2021     COVID-19 Vaccine (2 - Moderna 3-dose series) 12/29/2021       Understanding USDA MyPlate  The USDA has guidelines to help you make healthy food choices. These are called MyPlate. MyPlate shows the food groups that make up healthy meals using the image of a place setting. Before you eat, think about the healthiest choices for what to put on your plate or in your cup or bowl. To learn more about building a healthy plate, visit www.choosemyplate.gov.    The food groups    Fruits. Any fruit or 100% fruit juice counts as part of the Fruit Group. Fruits may be fresh, canned, frozen, or dried, and may be whole, cut-up, or pureed. Make 1/2 of your plate fruits and vegetables.    Vegetables. Any vegetable or 100% vegetable juice counts as a member of the Vegetable Group. Vegetables may be fresh, frozen, canned, or dried. They can be served raw or cooked and may be whole, cut-up, or mashed. Make 1/2 of your plate fruits and vegetables.    Grains. All foods made from grains are part of the Grains Group. These include wheat, rice, oats, cornmeal, and barley. Grains are often used to make foods such  as bread, pasta, oatmeal, cereal, tortillas, and grits. Grains should be no more than 1/4 of your plate. At least half of your grains should be whole grains.    Protein. This group includes meat, poultry, seafood, beans and peas, eggs, processed soy products (such as tofu), nuts (including nut butters), and seeds. Make protein choices no more than 1/4 of your plate. Meat and poultry choices should be lean or low fat.    Dairy. The Dairy Group includes all fluid milk products and foods made from milk that contain calcium, such as yogurt and cheese. (Foods that have little calcium, such as cream, butter, and cream cheese, are not part of this group.) Most dairy choices should be low-fat or fat-free.    Oils. Oils aren't a food group, but they do contain essential nutrients. However it's important to watch your intake of oils. These are fats that are liquid at room temperature. They include canola, corn, olive, soybean, vegetable, and sunflower oil. Foods that are mainly oil include mayonnaise, certain salad dressings, and soft margarines. You likely already get your daily oil allowance from the foods you eat.  Things to limit  Eating healthy also means limiting these things in your diet:       Salt (sodium). Many processed foods have a lot of sodium. To keep sodium intake down, eat fresh vegetables, meats, poultry, and seafood when possible. Purchase low-sodium, reduced-sodium, or no-salt-added food products at the store. And don't add salt to your meals at home. Instead, season them with herbs and spices such as dill, oregano, cumin, and paprika. Or try adding flavor with lemon or lime zest and juice.    Saturated fat. Saturated fats are most often found in animal products such as beef, pork, and chicken. They are often solid at room temperature, such as butter. To reduce your saturated fat intake, choose leaner cuts of meat and poultry. And try healthier cooking methods such as grilling, broiling, roasting, or  baking. For a simple lower-fat swap, use plain nonfat yogurt instead of mayonnaise when making potato salad or macaroni salad.    Added sugars. These are sugars added to foods. They are in foods such as ice cream, candy, soda, fruit drinks, sports drinks, energy drinks, cookies, pastries, jams, and syrups. Cut down on added sugars by sharing sweet treats with a family member or friend. You can also choose fruit for dessert, and drink water or other unsweetened beverages.     For [employer] last reviewed this educational content on 6/1/2020 2000-2021 [employer]. All rights reserved. This information is not intended as a substitute for professional medical care. Always follow your healthcare professional's instructions.

## 2021-12-27 ENCOUNTER — OFFICE VISIT (OUTPATIENT)
Dept: PODIATRY | Facility: CLINIC | Age: 66
End: 2021-12-27
Payer: COMMERCIAL

## 2021-12-27 VITALS
BODY MASS INDEX: 25.69 KG/M2 | HEIGHT: 63 IN | WEIGHT: 145 LBS | DIASTOLIC BLOOD PRESSURE: 95 MMHG | SYSTOLIC BLOOD PRESSURE: 168 MMHG | HEART RATE: 92 BPM

## 2021-12-27 DIAGNOSIS — D21.21 FIBROMA OF RIGHT FOOT: Primary | ICD-10-CM

## 2021-12-27 PROCEDURE — 99203 OFFICE O/P NEW LOW 30 MIN: CPT | Performed by: PODIATRIST

## 2021-12-27 ASSESSMENT — MIFFLIN-ST. JEOR: SCORE: 1171.85

## 2021-12-27 NOTE — LETTER
12/27/2021         RE: Levar Sesay  [address] [facility] [address]        Dear Colleague,    Thank you for referring your patient, Levar Sesay, to the [facility]. Please see a copy of my visit note below.    PATIENT HISTORY:  Levar Sesay is a 66 year old female who presents to clinic in consultation at the request of  Diana Fontaine C.N.P. with a chief complaint of mass on bottom of right foot.  The patient is seen by themselves.  The patient relates the pain is primarily located around the bottom of arch.  Reports insidious onset without acute precipitating event. that has been going on for 5 year(s).  The patient has previously tried nothing with little relief.  Denies any prior history of similar issues..  The patient is retired.  Any previous notes and studies that pertain to the patient's condition were reviewed.    Pertinent medical, surgical and family history was reviewed in Epic chart and include rosacea    Medications:   Current Outpatient Medications:      calcium carbonate-vitamin D (OS-ABELINO) 500-400 MG-UNIT tablet, Take 1 tablet by mouth daily, Disp: , Rfl:      CRANBERRY CONCENTRATE PO, , Disp: , Rfl:      fluticasone (FLONASE) 50 MCG/ACT nasal spray, Spray 1 spray into both nostrils daily, Disp: 15.8 mL, Rfl: 1     MULTIPLE VITAMIN OR, None Entered, Disp: , Rfl:      triamcinolone (KENALOG) 0.1 % external ointment, Apply topically 2 times daily, Disp: 30 g, Rfl: 1    Current Facility-Administered Medications:      betamethasone acet & sod phos (CELESTONE) injection 6 mg, 6 mg, , , Carl Fernandes MD, 6 mg at 01/04/21 0731     betamethasone acet & sod phos (CELESTONE) injection 6 mg, 6 mg, , , Carl Fernandes MD, 6 mg at 01/04/21 0731     ropivacaine (NAROPIN) injection 0.5 mL, 0.5 mL, , , Carl Fernandes MD, 0.5 mL at 01/04/21 0731     ropivacaine (NAROPIN) injection 0.5 mL, 0.5 mL, , , Carl Fernandes MD, 0.5 mL at  01/04/21 0731     Allergies:    Allergies   Allergen Reactions     Sulfa Drugs      Sulfa       Vitals: LMP 09/15/2003   BMI= There is no height or weight on file to calculate BMI.    LOWER EXTREMITY PHYSICAL EXAM    Dermatologic: Skin is intact to right lower extremity without significant lesions, rash or abrasion.        Vascular: DP & PT pulses are intact & regular on the right.   CFT and skin temperature is normal to the right lower extremity.     Neurologic: Lower extremity sensation is intact to light touch.  No evidence of weakness in the right lower extremity.        Musculoskeletal: Patient is ambulatory without assistive device or brace.  No gross ankle deformity noted.  No foot or ankle joint effusion is noted.  Noted palpable firm soft tissue mass just proximal to the first metatarsophalangeal joint on the right.  The mass appears to be along the medial band of the plantar fascia.  No evidence of dermal adhesion noted.         ASSESSMENT / PLAN:     ICD-10-CM    1. Mass of soft tissue of foot  M79.89     right   2. Right foot pain  M79.671        I have explained to Levar about the conditions.  We discussed the underlying contributing factors to the condition as well as treatment options along with expected length of recovery.  At this time, the patient was instructed to wear soft accommodative shoe inserts to reduce the amount of pressure from the underlying fibroma lesion.  I recommended a steroid injection if the fibroma becomes larger and more symptomatic.    Levar verbalized agreement with and understanding of the rational for the diagnosis and treatment plan.  All questions were answered to best of my ability and the patient's satisfaction. The patient was advised to contact the clinic with any questions that may arise after the clinic visit.      Disclaimer: This note consists of symbols derived from keyboarding, dictation and/or voice recognition software. As a result, there may be errors in  the script that have gone undetected. Please consider this when interpreting information found in this chart.       JOSE Dao D.P.M., F.A.C.F.A.S.        Again, thank you for allowing me to participate in the care of your patient.        Sincerely,        Maurilio Dao DPM

## 2021-12-27 NOTE — PROGRESS NOTES
PATIENT HISTORY:  Levar Sesay is a 66 year old female who presents to clinic in consultation at the request of  Diana Fontaine C.N.P. with a chief complaint of mass on bottom of right foot.  The patient is seen by themselves.  The patient relates the pain is primarily located around the bottom of arch.  Reports insidious onset without acute precipitating event. that has been going on for 5 year(s).  The patient has previously tried nothing with little relief.  Denies any prior history of similar issues..  The patient is retired.  Any previous notes and studies that pertain to the patient's condition were reviewed.    Pertinent medical, surgical and family history was reviewed in Epic chart and include rosacea    Medications:   Current Outpatient Medications:      calcium carbonate-vitamin D (OS-ABELINO) 500-400 MG-UNIT tablet, Take 1 tablet by mouth daily, Disp: , Rfl:      CRANBERRY CONCENTRATE PO, , Disp: , Rfl:      fluticasone (FLONASE) 50 MCG/ACT nasal spray, Spray 1 spray into both nostrils daily, Disp: 15.8 mL, Rfl: 1     MULTIPLE VITAMIN OR, None Entered, Disp: , Rfl:      triamcinolone (KENALOG) 0.1 % external ointment, Apply topically 2 times daily, Disp: 30 g, Rfl: 1    Current Facility-Administered Medications:      betamethasone acet & sod phos (CELESTONE) injection 6 mg, 6 mg, , , Carl Fernandes MD, 6 mg at 01/04/21 0731     betamethasone acet & sod phos (CELESTONE) injection 6 mg, 6 mg, , , Carl Fernandes MD, 6 mg at 01/04/21 0731     ropivacaine (NAROPIN) injection 0.5 mL, 0.5 mL, , , Carl Fernandes MD, 0.5 mL at 01/04/21 0731     ropivacaine (NAROPIN) injection 0.5 mL, 0.5 mL, , , Carl Fernandes MD, 0.5 mL at 01/04/21 0731     Allergies:    Allergies   Allergen Reactions     Sulfa Drugs      Sulfa       Vitals: LMP 09/15/2003   BMI= There is no height or weight on file to calculate BMI.    LOWER EXTREMITY PHYSICAL EXAM    Dermatologic: Skin is intact to right lower extremity  without significant lesions, rash or abrasion.        Vascular: DP & PT pulses are intact & regular on the right.   CFT and skin temperature is normal to the right lower extremity.     Neurologic: Lower extremity sensation is intact to light touch.  No evidence of weakness in the right lower extremity.        Musculoskeletal: Patient is ambulatory without assistive device or brace.  No gross ankle deformity noted.  No foot or ankle joint effusion is noted.  Noted palpable firm soft tissue mass just proximal to the first metatarsophalangeal joint on the right.  The mass appears to be along the medial band of the plantar fascia.  No evidence of dermal adhesion noted.         ASSESSMENT / PLAN:     ICD-10-CM    1. Mass of soft tissue of foot  M79.89     right   2. Right foot pain  M79.671        I have explained to Levar about the conditions.  We discussed the underlying contributing factors to the condition as well as treatment options along with expected length of recovery.  At this time, the patient was instructed to wear soft accommodative shoe inserts to reduce the amount of pressure from the underlying fibroma lesion.  I recommended a steroid injection if the fibroma becomes larger and more symptomatic.    Levar verbalized agreement with and understanding of the rational for the diagnosis and treatment plan.  All questions were answered to best of my ability and the patient's satisfaction. The patient was advised to contact the clinic with any questions that may arise after the clinic visit.      Disclaimer: This note consists of symbols derived from keyboarding, dictation and/or voice recognition software. As a result, there may be errors in the script that have gone undetected. Please consider this when interpreting information found in this chart.       JOSE Dao D.P.M., FGONZALO.F.A.S.

## 2021-12-27 NOTE — NURSING NOTE
"Chief Complaint   Patient presents with     Consult     soft tissue lump on arch of foot       Initial BP (!) 168/95   Pulse 92   Ht 1.6 m (5' 3\")   Wt 65.8 kg (145 lb)   LMP 09/15/2003   BMI 25.69 kg/m   Estimated body mass index is 25.69 kg/m  as calculated from the following:    Height as of this encounter: 1.6 m (5' 3\").    Weight as of this encounter: 65.8 kg (145 lb).  Medications and allergies reviewed.      Zofia FRANKEL MA    "

## 2021-12-27 NOTE — PATIENT INSTRUCTIONS
PLANTAR FIBROMA  A plantar fibroma is a fibrous knot (nodule) in the arch of the foot. It is embedded within the plantar fascia, a band of tissue that extends from the heel to the toes on the bottom of the foot. A plantar fibroma can develop in one or both feet, is benign (non-malignant), and usually will not go away or get smaller without treatment. Definitive causes for this condition have not been clearly identified.    SIGNS & SYMPTOMS  The characteristic sign of a plantar fibroma is a noticeable lump in the arch that feels firm to the touch. This mass can remain the same size or get larger over time, or additional fibromas may develop.  People who have a plantar fibroma may or may not have pain. When pain does occur, it is often caused by shoes pushing against the lump in the arch, although it can also arise when walking or standing barefoot.    DIAGNOSIS  To diagnose a plantar fibroma, the foot and ankle surgeon will examine the foot and press on the affected area. Sometimes this can produce pain that extends down to the toes. An MRI or biopsy may be performed to further evaluate the lump and aid in diagnosis.    TREATMENT OPTIONS   Non-surgical treatment may help relieve the pain of a plantar fibroma, although it will not make the mass disappear. The foot and ankle surgeon may select one or more of the following non-surgical options:     Steroid injections. Injecting corticosteroid medication into the mass may help  shrink it and thereby relieve the pain that occurs when walking. This reduction  may be only temporary and the fibroma could slowly return to its original size.      Orthotic devices. If the fibroma is stable, meaning it is not changing in size,  custom orthotic devices (shoe inserts) may relieve the pain by distributing the  patient s weight away from the fibroma.      Physical therapy. The pain is sometimes treated through physical therapy  methods that deliver anti-inflammatory medication into  the fibroma without the  need for injection.  Cross friction massage.     Verapamil Cream: Applying 10% or higher verapamil cream can help to decrease  size.     Surgery: If the mass increases in size or pain, the patient should be further  evaluated. Surgical treatment to remove the fibroma is considered if the patient  continues to experience pain following non-surgical approaches. Surgical  removal of a plantar fibroma may result in a flattening of the arch or  development of hammertoes. Orthotic devices may be prescribed to provide  support to the foot. Due to the high incidence of recurrence with this condition,  continued follow-up with the foot and ankle surgeon is recommended.

## 2021-12-31 ENCOUNTER — HOSPITAL ENCOUNTER (OUTPATIENT)
Dept: MAMMOGRAPHY | Facility: CLINIC | Age: 66
End: 2021-12-31
Attending: NURSE PRACTITIONER
Payer: COMMERCIAL

## 2021-12-31 ENCOUNTER — HOSPITAL ENCOUNTER (OUTPATIENT)
Dept: ULTRASOUND IMAGING | Facility: CLINIC | Age: 66
End: 2021-12-31
Attending: NURSE PRACTITIONER
Payer: COMMERCIAL

## 2021-12-31 DIAGNOSIS — R59.0 ENLARGED LYMPH NODES IN ARMPIT: ICD-10-CM

## 2021-12-31 PROCEDURE — 77066 DX MAMMO INCL CAD BI: CPT

## 2021-12-31 PROCEDURE — 76882 US LMTD JT/FCL EVL NVASC XTR: CPT | Mod: RT

## 2022-01-01 NOTE — NURSING NOTE
"Chief Complaint   Patient presents with     Physical       Initial /80 (BP Location: Right arm, Cuff Size: Adult Regular)  Pulse 72  Temp 98.5  F (36.9  C) (Tympanic)  Resp 18  Ht 5' 3\" (1.6 m)  Wt 147 lb (66.7 kg)  LMP 09/15/2003  BMI 26.04 kg/m2 Estimated body mass index is 26.04 kg/(m^2) as calculated from the following:    Height as of this encounter: 5' 3\" (1.6 m).    Weight as of this encounter: 147 lb (66.7 kg).    Patient presents to the clinic using No DME    Health Maintenance that is potentially due pending provider review:  Mammogram, Pap Smear and Colonoscopy/FIT    Will do pap today. Willing to schedule mammo today. Had colonoscopy 4 years ago- will get SHANKAR    Is there anyone who you would like to be able to receive your results? No  If yes have patient fill out SHANKAR      " [phone]

## 2022-07-18 ENCOUNTER — HOSPITAL ENCOUNTER (OUTPATIENT)
Dept: ULTRASOUND IMAGING | Facility: CLINIC | Age: 67
Discharge: HOME | End: 2022-07-18
Attending: NURSE PRACTITIONER | Admitting: NURSE PRACTITIONER
Payer: COMMERCIAL

## 2022-07-18 DIAGNOSIS — R59.0 AXILLARY ADENOPATHY: ICD-10-CM

## 2022-07-18 DIAGNOSIS — R92.8 BI-RADS CATEGORY 3 MAMMOGRAM RESULT: ICD-10-CM

## 2022-07-18 PROCEDURE — 76882 US LMTD JT/FCL EVL NVASC XTR: CPT | Mod: RT

## 2022-07-28 ENCOUNTER — HOSPITAL ENCOUNTER (OUTPATIENT)
Dept: ULTRASOUND IMAGING | Facility: CLINIC | Age: 67
Discharge: HOME | End: 2022-07-28
Attending: NURSE PRACTITIONER
Payer: COMMERCIAL

## 2022-07-28 DIAGNOSIS — R59.0 AXILLARY ADENOPATHY: ICD-10-CM

## 2022-07-28 DIAGNOSIS — R92.8 BI-RADS CATEGORY 3 MAMMOGRAM RESULT: ICD-10-CM

## 2022-07-28 PROCEDURE — 88305 TISSUE EXAM BY PATHOLOGIST: CPT | Mod: 26 | Performed by: PATHOLOGY

## 2022-07-28 PROCEDURE — 88185 FLOWCYTOMETRY/TC ADD-ON: CPT | Performed by: NURSE PRACTITIONER

## 2022-07-28 PROCEDURE — 88341 IMHCHEM/IMCYTCHM EA ADD ANTB: CPT | Mod: 26 | Performed by: PATHOLOGY

## 2022-07-28 PROCEDURE — 88342 IMHCHEM/IMCYTCHM 1ST ANTB: CPT | Mod: 26 | Performed by: PATHOLOGY

## 2022-07-28 PROCEDURE — 272N000431 US BIOPSY AXILLARY

## 2022-07-28 PROCEDURE — 88342 IMHCHEM/IMCYTCHM 1ST ANTB: CPT | Mod: TC,XU | Performed by: NURSE PRACTITIONER

## 2022-07-28 PROCEDURE — 88307 TISSUE EXAM BY PATHOLOGIST: CPT | Mod: TC | Performed by: NURSE PRACTITIONER

## 2022-07-28 PROCEDURE — 250N000009 HC RX 250: Performed by: NURSE PRACTITIONER

## 2022-07-28 PROCEDURE — 88189 FLOWCYTOMETRY/READ 16 & >: CPT | Performed by: STUDENT IN AN ORGANIZED HEALTH CARE EDUCATION/TRAINING PROGRAM

## 2022-07-28 RX ORDER — LIDOCAINE HYDROCHLORIDE 10 MG/ML
10 INJECTION, SOLUTION EPIDURAL; INFILTRATION; INTRACAUDAL; PERINEURAL ONCE
Status: COMPLETED | OUTPATIENT
Start: 2022-07-28 | End: 2022-07-28

## 2022-07-28 RX ADMIN — LIDOCAINE HYDROCHLORIDE 5 ML: 10 INJECTION, SOLUTION EPIDURAL; INFILTRATION; INTRACAUDAL; PERINEURAL at 08:20

## 2022-07-29 LAB
PATH REPORT.COMMENTS IMP SPEC: NORMAL
PATH REPORT.FINAL DX SPEC: NORMAL
PATH REPORT.FINAL DX SPEC: NORMAL
PATH REPORT.GROSS SPEC: NORMAL
PATH REPORT.MICROSCOPIC SPEC OTHER STN: NORMAL
PATH REPORT.MICROSCOPIC SPEC OTHER STN: NORMAL
PATH REPORT.RELEVANT HX SPEC: NORMAL
PHOTO IMAGE: NORMAL

## 2022-08-01 ENCOUNTER — TELEPHONE (OUTPATIENT)
Dept: MAMMOGRAPHY | Facility: CLINIC | Age: 67
End: 2022-08-01

## 2022-08-01 NOTE — TELEPHONE ENCOUNTER
I called patient today at 2:35p.m. and left a message with a male asking if he could have patient call me back at her earliest convenience.    I am reaching out to patient to inform of her US guided Right axillary lymph node needle biopsy results(7/28/22) below, as well as the recommendations for follow up per breast radiologist- Dr. Mikel Gonzalez.    Frances Aburto, RN BSN  Breast Nurse Care Coordinator  [facility] [place]  [phone]      Levar Sesay [id number]  F, 1955  Surgical Pathology Report (Final result) [id number]  Authorizing Provider: Diana Fontaine APRN CNP Ordering Provider: Diana Fontaine APRN CNP  Ordering Location: [facility]  Imaging  Collected: 07/28/2022 08:35 AM  Pathologist: Armando Rae MD Received: 07/28/2022 08:39 AM  .  Specimens  A Lymph Node(s), Axillary, Right  .  .  Final Diagnosis  Lymph node, right axillary, needle core biopsy:  - Partially sampled lymph node with no morphologic or immunophenotypic features of malignancy (including lymphoma),  granulomas, or necrosis. See comment.  COMMENT:  There are increased neutrophils in subcapsular and sinusoidal spaces, and an inflammatory or reactive etiology for the lymph node  enlargement cannot be excluded. Clinical correlation is recommended.  Electronically signed by Armando Rae MD on 7/29/2022 at 3:47 PM      Levar Sesay [id number]  F, 1955  Flow Cytometry Report (Final result) [id number]  Authorizing Provider: Diana Fontaine APRN CNP Ordering Provider: Diana Fontaine APRN CNP  Ordering Location: [facility]  Imaging  Collected: 07/28/2022 08:36 AM  Pathologist: Myesha Barclay MD Received: 07/28/2022 08:39 AM  .  Specimens  A Lymph Node(s), Axillary, Right  .  .  Flow Interpretation  A. Lymph Node(s), Axillary, Right:  -Polytypic B cells  -No aberrant immunophenotype on T  cells; increased CD4:CD8 ratio among T cells  -See comment  Electronically signed by Myesha Barclay MD on 7/29/2022 at 10:16 AM

## 2022-08-02 NOTE — TELEPHONE ENCOUNTER
I called patient again this afternoon and left a voicemail message asking if she could return my call when she is available.    I am reaching out to inform patient of her biopsy results and the follow up recommendations.  Frances Aburto RN, BSN

## 2022-08-03 NOTE — TELEPHONE ENCOUNTER
I spoke with patient this morning, confirmed her full name, date of birth and notified her of benign US guided Right Axillary Lymph Node biopsy results, and the recommendations for routine annual screening mammogram.  See Progress Notes 7/28/2022.    Frances Aburto RN BSN  Breast Nurse Care Coordinator  [facility] [place] [place]  [phone]

## 2022-08-03 NOTE — PROGRESS NOTES
BENIGN PATH:  Pathology report reviewed with our breast radiologist Dr. Mikel Gonzalez, who confirmed the recent breast imaging is concordant with the final pathology results below.    I phoned Levar Sesay, confirmed her full name, date of birth, and informed patient of her Ultrasound Guided Right Axillary Lymph Node Needle Biopsy (7/28/2022) results showing benign tissue.     Recommended follow up is routine annual screening mammogram.  Patient will be due for her next screening mammogram after 1/1/2023.  Patient states no problems or concerns with her biopsy site.   Questions were answered and my phone number given if she has further questions or concerns.  I informed patient I will notify the ordering provider of the results and recommendations for follow up.  I also advised patient to notify her primary provider- Diana Fotnaine- TOMAS, and Palak Mckenzie- CNP if any breast symptoms or changes arise.  Patient verbalized understanding and agrees with the plan of care.     Frances Aburto RN BSN  Breast Care Nurse Coordinator  [facility]  [phone]      Levar Sesay [id number]  F, 1955  Surgical Pathology Report (Final result) [id number]  Authorizing Provider: Diana Fontaine APRN CNP Ordering Provider: Diana Fontaine APRN CNP  Ordering Location: [facility]  Imaging  Collected: 07/28/2022 08:35 AM  Pathologist: Armando Rae MD Received: 07/28/2022 08:39 AM  .  Specimens  A Lymph Node(s), Axillary, Right  .  .  Final Diagnosis  Lymph node, right axillary, needle core biopsy:  - Partially sampled lymph node with no morphologic or immunophenotypic features of malignancy (including lymphoma),  granulomas, or necrosis.

## 2022-08-04 DIAGNOSIS — R59.0 AXILLARY ADENOPATHY: Primary | ICD-10-CM

## 2022-08-18 ENCOUNTER — OFFICE VISIT (OUTPATIENT)
Dept: SURGERY | Facility: CLINIC | Age: 67
End: 2022-08-18
Payer: COMMERCIAL

## 2022-08-18 VITALS
DIASTOLIC BLOOD PRESSURE: 84 MMHG | BODY MASS INDEX: 25.7 KG/M2 | TEMPERATURE: 99.1 F | WEIGHT: 145.06 LBS | SYSTOLIC BLOOD PRESSURE: 163 MMHG | HEIGHT: 63 IN | HEART RATE: 83 BPM

## 2022-08-18 DIAGNOSIS — R59.0 AXILLARY ADENOPATHY: ICD-10-CM

## 2022-08-18 PROCEDURE — 99213 OFFICE O/P EST LOW 20 MIN: CPT | Performed by: SURGERY

## 2022-08-18 NOTE — LETTER
2022         RE: Levar Sesay  [id number] [facility] [address]        Dear Colleague,    Thank you for referring your patient, Levar Sesay, to the [facility]. Please see a copy of my visit note below.    Surgical Consultation/History and Physical  [facility]    Levar is seen in consultation for axillary lymphadenopathy, at the request of SHYANNE Stallworth CNP.    Chief Complaint:  Right axillary lymphadenopathy    History of Present Illness: Levar Sesay is a 66 year old female presents with right axillary lymphadenopathy.  This has been present for last 7 years.  They have been variable in size and decreased since her biopsy.  She periodically checks the area.  She denies weight loss, other lymphadenopathy.  She has occasional sweats at night she attributes to menopause and is long standing.  Denies family history of lymphoma.  Denies breast cancer in the family.    Patient Active Problem List   Diagnosis     Postmenopausal atrophic vaginitis     MENOPAUSE MENOPAUSAL SYMPTOMS     Rosacea     Past Medical History:   Diagnosis Date     Abnormal glandular Papanicolaou smear of cervix 2003    ASCUS; negative HPV     ASCUS of cervix with negative high risk HPV 2018    plan to repeat Pap+HPV cotesting in 3 years ()     Past Surgical History:   Procedure Laterality Date     SURGICAL HISTORY OF -       Tonsillectomy     SURGICAL HISTORY OF -   1986         SURGICAL HISTORY OF -       D & C     SURGICAL HISTORY OF -   2005    carpal tunnel surgery     Family History   Problem Relation Age of Onset     Hypertension Mother      Cancer Mother         BCC     Arthritis Mother         unspecified     Eye Disorder Mother         glaucoma, cataract     Cerebrovascular Disease Father      Cancer Father         lung  - smoked and in [employer]     Respiratory Father      Cancer Maternal Grandmother         Uterine      "Alcohol/Drug Maternal Grandfather      Cancer Paternal Grandmother         Uterine     Cancer Half-Sister         Lung; smoked     Arthritis Half-Sister      Thyroid Disease Other      Depression Sister      Social History     Tobacco Use     Smoking status: Never Smoker     Smokeless tobacco: Never Used   Substance Use Topics     Alcohol use: Yes     Comment: weekends      History   Drug Use No     Current Outpatient Medications   Medication Sig Dispense Refill     calcium carbonate-vitamin D (OS-ABELINO) 500-400 MG-UNIT tablet Take 1 tablet by mouth daily       CRANBERRY CONCENTRATE PO        fluticasone (FLONASE) 50 MCG/ACT nasal spray Spray 1 spray into both nostrils daily 15.8 mL 1     MULTIPLE VITAMIN OR None Entered       triamcinolone (KENALOG) 0.1 % external ointment Apply topically 2 times daily 30 g 1     Allergies   Allergen Reactions     Sulfa Drugs      Sulfa     Review of Systems:   10 point ROS otherwise negative    Physical Exam:  BP (!) 163/84 (BP Location: Right arm, Patient Position: Sitting, Cuff Size: Adult Regular)   Pulse 83   Temp 99.1  F (37.3  C) (Tympanic)   Ht 1.6 m (5' 3\")   Wt 65.8 kg (145 lb 1 oz)   LMP 09/15/2003   BMI 25.70 kg/m      Constitutional- No acute distress, well nourished, non-toxic  Eyes: Anicteric, no injection.  PERRL  ENT:  Normocephalic, atraumatic, Nose midline, moist mucus membranes  Neck - supple, no LAD, Thyroid smooth, symmetric, Carotids without bruits  Respiratory- Clear to auscultation bilaterally, good inspiratory effort  Cardiovascular - Heart RRR, no lift's, thrills, murmurs, rubs, or gallop.  No peripheral edema.  No clubbing.  Abdomen - Soft, non-tender, +BS, no hepatosplenomegaly, no palpable masses  Neuro - No focal neuro deficits, Alert and oriented x 3  Psych: Appropriate mood and affect  Musculoskeletal: Normal gait, symmetric strength.  FROM upper and lower extremities.  Skin: Warm, Dry  Breast: Examined with Adrianna Morales.  No masses either " breast or left axilla.  Right axilla 3.0 cm firm mass consistent with known lymph node.  No overlying skin changes    Assessment:  1. Axillary adenopathy      Plan:   Mrs. Sesay presents with ongoing right axillary lymphadenopathy.  This was biopsied with benign pathology.  Given continued enlargement resulting in some discomfort and anxiety, she would like this excised.  I reviewed the indications, risks, benefits, alternatives and she wishes to proceed.  Risks discussed include but are not limited to: bleeding, infection, damage to adjacent structures (nerves, blood vessels, muscles), lymphedema, anesthetic complications and recurrence and she wishes to proceed.     Ruslan Bahena DO on 8/18/2022 at 12:52 PM        Again, thank you for allowing me to participate in the care of your patient.        Sincerely,        Ruslan Bahena DO

## 2022-08-18 NOTE — NURSING NOTE
"Initial BP (!) 163/84 (BP Location: Right arm, Patient Position: Sitting, Cuff Size: Adult Regular)   Pulse 83   Temp 99.1  F (37.3  C) (Tympanic)   Ht 1.6 m (5' 3\")   Wt 65.8 kg (145 lb 1 oz)   LMP 09/15/2003   BMI 25.70 kg/m   Estimated body mass index is 25.7 kg/m  as calculated from the following:    Height as of this encounter: 1.6 m (5' 3\").    Weight as of this encounter: 65.8 kg (145 lb 1 oz). .    Myesha Mcdonnell MA    "

## 2022-08-18 NOTE — PROGRESS NOTES
Surgical Consultation/History and Physical  [facility]    Levar is seen in consultation for axillary lymphadenopathy, at the request of SHYANNE Stallworth CNP.    Chief Complaint:  Right axillary lymphadenopathy    History of Present Illness: Levar Sesay is a 66 year old female presents with right axillary lymphadenopathy.  This has been present for last 7 years.  They have been variable in size and decreased since her biopsy.  She periodically checks the area.  She denies weight loss, other lymphadenopathy.  She has occasional sweats at night she attributes to menopause and is long standing.  Denies family history of lymphoma.  Denies breast cancer in the family.    Patient Active Problem List   Diagnosis     Postmenopausal atrophic vaginitis     MENOPAUSE MENOPAUSAL SYMPTOMS     Rosacea     Past Medical History:   Diagnosis Date     Abnormal glandular Papanicolaou smear of cervix 2003    ASCUS; negative HPV     ASCUS of cervix with negative high risk HPV 2018    plan to repeat Pap+HPV cotesting in 3 years ()     Past Surgical History:   Procedure Laterality Date     SURGICAL HISTORY OF -       Tonsillectomy     SURGICAL HISTORY OF -   1986         SURGICAL HISTORY OF -       D & C     SURGICAL HISTORY OF -   2005    carpal tunnel surgery     Family History   Problem Relation Age of Onset     Hypertension Mother      Cancer Mother         BCC     Arthritis Mother         unspecified     Eye Disorder Mother         glaucoma, cataract     Cerebrovascular Disease Father      Cancer Father         lung  - smoked and in [employer]     Respiratory Father      Cancer Maternal Grandmother         Uterine     Alcohol/Drug Maternal Grandfather      Cancer Paternal Grandmother         Uterine     Cancer Half-Sister         Lung; smoked     Arthritis Half-Sister      Thyroid Disease Other      Depression Sister      Social History     Tobacco Use     Smoking  "status: Never Smoker     Smokeless tobacco: Never Used   Substance Use Topics     Alcohol use: Yes     Comment: weekends      History   Drug Use No     Current Outpatient Medications   Medication Sig Dispense Refill     calcium carbonate-vitamin D (OS-ABELINO) 500-400 MG-UNIT tablet Take 1 tablet by mouth daily       CRANBERRY CONCENTRATE PO        fluticasone (FLONASE) 50 MCG/ACT nasal spray Spray 1 spray into both nostrils daily 15.8 mL 1     MULTIPLE VITAMIN OR None Entered       triamcinolone (KENALOG) 0.1 % external ointment Apply topically 2 times daily 30 g 1     Allergies   Allergen Reactions     Sulfa Drugs      Sulfa     Review of Systems:   10 point ROS otherwise negative    Physical Exam:  BP (!) 163/84 (BP Location: Right arm, Patient Position: Sitting, Cuff Size: Adult Regular)   Pulse 83   Temp 99.1  F (37.3  C) (Tympanic)   Ht 1.6 m (5' 3\")   Wt 65.8 kg (145 lb 1 oz)   LMP 09/15/2003   BMI 25.70 kg/m      Constitutional- No acute distress, well nourished, non-toxic  Eyes: Anicteric, no injection.  PERRL  ENT:  Normocephalic, atraumatic, Nose midline, moist mucus membranes  Neck - supple, no LAD, Thyroid smooth, symmetric, Carotids without bruits  Respiratory- Clear to auscultation bilaterally, good inspiratory effort  Cardiovascular - Heart RRR, no lift's, thrills, murmurs, rubs, or gallop.  No peripheral edema.  No clubbing.  Abdomen - Soft, non-tender, +BS, no hepatosplenomegaly, no palpable masses  Neuro - No focal neuro deficits, Alert and oriented x 3  Psych: Appropriate mood and affect  Musculoskeletal: Normal gait, symmetric strength.  FROM upper and lower extremities.  Skin: Warm, Dry  Breast: Examined with Adrianna Morales.  No masses either breast or left axilla.  Right axilla 3.0 cm firm mass consistent with known lymph node.  No overlying skin changes    Assessment:  1. Axillary adenopathy      Plan:   Mrs. Sesay presents with ongoing right axillary lymphadenopathy.  This was biopsied " with benign pathology.  Given continued enlargement resulting in some discomfort and anxiety, she would like this excised.  I reviewed the indications, risks, benefits, alternatives and she wishes to proceed.  Risks discussed include but are not limited to: bleeding, infection, damage to adjacent structures (nerves, blood vessels, muscles), lymphedema, anesthetic complications and recurrence and she wishes to proceed.     Ruslan Bahena, DO on 8/18/2022 at 12:52 PM

## 2022-09-07 RX ORDER — CALCIUM CARBONATE 750 MG/1
750 TABLET, CHEWABLE ORAL DAILY
COMMUNITY

## 2022-09-09 ENCOUNTER — ANESTHESIA EVENT (OUTPATIENT)
Dept: SURGERY | Facility: CLINIC | Age: 67
End: 2022-09-09
Payer: COMMERCIAL

## 2022-09-09 ENCOUNTER — OFFICE VISIT (OUTPATIENT)
Dept: FAMILY MEDICINE | Facility: CLINIC | Age: 67
End: 2022-09-09
Payer: COMMERCIAL

## 2022-09-09 VITALS
RESPIRATION RATE: 16 BRPM | DIASTOLIC BLOOD PRESSURE: 72 MMHG | SYSTOLIC BLOOD PRESSURE: 132 MMHG | WEIGHT: 150 LBS | HEIGHT: 63 IN | TEMPERATURE: 98.7 F | BODY MASS INDEX: 26.58 KG/M2 | HEART RATE: 81 BPM

## 2022-09-09 DIAGNOSIS — R59.0 AXILLARY ADENOPATHY: ICD-10-CM

## 2022-09-09 DIAGNOSIS — Z01.818 PREOP GENERAL PHYSICAL EXAM: Primary | ICD-10-CM

## 2022-09-09 PROCEDURE — 99214 OFFICE O/P EST MOD 30 MIN: CPT | Performed by: NURSE PRACTITIONER

## 2022-09-09 NOTE — H&P (VIEW-ONLY)
[facility]  [address] [place] [id number]  Phone: [phone]  Fax: [phone]  Primary Provider: Palak Mckenzie  Pre-op Performing Provider: YOLANDA DEAN      PREOPERATIVE EVALUATION:  Today's date: 9/9/2022    Levar Sesay is a 66 year old female who presents for a preoperative evaluation.    Surgical Information:  Surgery/Procedure: Lymph Node, Axillary   Surgery Location: [facility]  Surgeon: Dr. Ruslan Bahena  Surgery Date: 9/12/22  Time of Surgery: 0915  Where patient plans to recover: At home with family  Fax number for surgical facility: Note does not need to be faxed, will be available electronically in Epic.    Type of Anesthesia Anticipated: Choice    Assessment & Plan     The proposed surgical procedure is considered LOW risk.    Preop general physical exam    Axillary adenopathy      Risks and Recommendations:  The patient has the following additional risks and recommendations for perioperative complications:   - No identified additional risk factors other than previously addressed    Medication Instructions:  Patient is on no chronic medications  hold supplements now    RECOMMENDATION:  APPROVAL GIVEN to proceed with proposed procedure, without further diagnostic evaluation.      Subjective     HPI related to upcoming procedure: Axillary adenopathy    Preop Questions 9/9/2022   1. Have you ever had a heart attack or stroke? No   2. Have you ever had surgery on your heart or blood vessels, such as a stent placement, a coronary artery bypass, or surgery on an artery in your head, neck, heart, or legs? No   3. Do you have chest pain with activity? No   4. Do you have a history of  heart failure? No   5. Do you currently have a cold, bronchitis or symptoms of other infection? No   6. Do you have a cough, shortness of breath, or wheezing? No   7. Do you or anyone in your family have previous history of blood clots? No   8. Do you or does anyone in your  family have a serious bleeding problem such as prolonged bleeding following surgeries or cuts? No   9. Have you ever had problems with anemia or been told to take iron pills? YES - none since menopause    10. Have you had any abnormal blood loss such as black, tarry or bloody stools, or abnormal vaginal bleeding? No   11. Have you ever had a blood transfusion? No   12. Are you willing to have a blood transfusion if it is medically needed before, during, or after your surgery? Yes   13. Have you or any of your relatives ever had problems with anesthesia? No   14. Do you have sleep apnea, excessive snoring or daytime drowsiness? No   15. Do you have any artifical heart valves or other implanted medical devices like a pacemaker, defibrillator, or continuous glucose monitor? No   16. Do you have artificial joints? No   17. Are you allergic to latex? No       Health Care Directive:  Patient does not have a Health Care Directive or Living Will: Discussed advance care planning with patient; information given to patient to review.    Preoperative Review of :   reviewed - no record of controlled substances prescribed.      Review of Systems  CONSTITUTIONAL: NEGATIVE for fever, chills, change in weight  INTEGUMENTARY/SKIN: NEGATIVE for worrisome rashes, moles or lesions  EYES: NEGATIVE for vision changes or irritation  ENT/MOUTH: NEGATIVE for ear, mouth and throat problems  RESP: NEGATIVE for significant cough or SOB  CV: NEGATIVE for chest pain, palpitations or peripheral edema  GI: NEGATIVE for nausea, abdominal pain, heartburn, or change in bowel habits  : NEGATIVE for frequency, dysuria, or hematuria  MUSCULOSKELETAL: NEGATIVE for significant arthralgias or myalgia  NEURO: NEGATIVE for weakness, dizziness or paresthesias  ENDOCRINE: NEGATIVE for temperature intolerance, skin/hair changes  HEME: NEGATIVE for bleeding problems  PSYCHIATRIC: NEGATIVE for changes in mood or affect    Patient Active Problem List     "Diagnosis Date Noted     Postmenopausal atrophic vaginitis 2005     Priority: Medium     MENOPAUSE MENOPAUSAL SYMPTOMS 2005     Priority: Medium     Rosacea 2005     Priority: Medium      Past Medical History:   Diagnosis Date     Abnormal glandular Papanicolaou smear of cervix 2003    ASCUS; negative HPV     ASCUS of cervix with negative high risk HPV 2018    plan to repeat Pap+HPV cotesting in 3 years ()     Past Surgical History:   Procedure Laterality Date     SURGICAL HISTORY OF -       Tonsillectomy     SURGICAL HISTORY OF -   1986         SURGICAL HISTORY OF -       D & C     SURGICAL HISTORY OF -   2005    carpal tunnel surgery     Current Outpatient Medications   Medication Sig Dispense Refill     calcium carbonate 750 MG CHEW Take 750 mg by mouth daily       triamcinolone (KENALOG) 0.1 % external ointment Apply topically 2 times daily 30 g 1     MULTIPLE VITAMIN OR None Entered (Patient not taking: Reported on 2022)         Allergies   Allergen Reactions     Sulfa Drugs      Sulfa        Social History     Tobacco Use     Smoking status: Never Smoker     Smokeless tobacco: Never Used   Substance Use Topics     Alcohol use: Yes     Comment: weekends     History   Drug Use No         Objective     /72 (Cuff Size: Adult Regular)   Pulse 81   Temp 98.7  F (37.1  C) (Tympanic)   Resp 16   Ht 1.6 m (5' 3\")   Wt 68 kg (150 lb)   LMP 09/15/2003   BMI 26.57 kg/m      Physical Exam    GENERAL APPEARANCE: healthy, alert and no distress     EYES: EOMI, PERRL     HENT: ear canals and TM's normal and nose and mouth without ulcers or lesions     NECK: no adenopathy, no asymmetry, masses, or scars and thyroid normal to palpation     RESP: lungs clear to auscultation - no rales, rhonchi or wheezes     CV: regular rates and rhythm, normal S1 S2, no S3 or S4 and no murmur, click or rub     MS: extremities normal- no gross deformities noted.     SKIN: " no suspicious lesions or rashes     NEURO: Normal strength and tone, sensory exam grossly normal, mentation intact and speech normal     PSYCH: mentation appears normal. and affect normal/bright     LYMPHATICS: No cervical adenopathy    Recent Labs   Lab Test 12/23/21  1104   HGB 13.8         POTASSIUM 4.3   CR 0.53        Diagnostics:  No labs were ordered during this visit.   No EKG required, no history of coronary heart disease, significant arrhythmia, peripheral arterial disease or other structural heart disease.    Revised Cardiac Risk Index (RCRI):  The patient has the following serious cardiovascular risks for perioperative complications:   - No serious cardiac risks = 0 points     RCRI Interpretation: 0 points: Class I (very low risk - 0.4% complication rate)           Signed Electronically by: SHYANNE Kirkland CNP  Copy of this evaluation report is provided to requesting physician.

## 2022-09-09 NOTE — PATIENT INSTRUCTIONS
Preparing for Your Surgery  Getting started  A nurse will call you to review your health history and instructions. They will give you an arrival time based on your scheduled surgery time. Please be ready to share:    Your doctor's clinic name and phone number    Your medical, surgical and anesthesia history    A list of allergies and sensitivities    A list of medicines, including herbal treatments and over-the-counter drugs    Whether the patient has a legal guardian (ask how to send us the papers in advance)  Please tell us if you're pregnant--or if there's any chance you might be pregnant. Some surgeries may injure a fetus (unborn baby), so they require a pregnancy test. Surgeries that are safe for a fetus don't always need a test, and you can choose whether to have one.   If you have a child who's having surgery, please ask for a copy of Preparing for Your Child's Surgery.    Preparing for surgery    Within 30 days of surgery: Have a pre-op exam (sometimes called an H&P, or History and Physical). This can be done at a clinic or pre-operative center.  ? If you're having a , you may not need this exam. Talk to your care team.    At your pre-op exam, talk to your care team about all medicines you take. If you need to stop any medicines before surgery, ask when to start taking them again.  ? We do this for your safety. Many medicines can make you bleed too much during surgery. Some change how well surgery (anesthesia) drugs work.    Call your insurance company to let them know you're having surgery. (If you don't have insurance, call [phone].)    Call your clinic if there's any change in your health. This includes signs of a cold or flu (sore throat, runny nose, cough, rash, fever). It also includes a scrape or scratch near the surgery site.    If you have questions on the day of surgery, call your hospital or surgery center.  COVID testing  You may need to be tested for COVID-19 before having  surgery. If so, we will give you instructions.  Eating and drinking guidelines  For your safety: Unless your surgeon tells you otherwise, follow the guidelines below.    Eat and drink as usual until 8 hours before surgery. After that, no food or milk.    Drink clear liquids until 2 hours before surgery. These are liquids you can see through, like water, Gatorade and Propel Water. You may also have black coffee and tea (no cream or milk).    Nothing by mouth within 2 hours of surgery. This includes gum, candy and breath mints.    If you drink alcohol: Stop drinking it the night before surgery.    If your care team tells you to take medicine on the morning of surgery, it's okay to take it with a sip of water.  Preventing infection    Shower or bathe the night before and morning of your surgery. Follow the instructions your clinic gave you. (If no instructions, use regular soap.)    Don't shave or clip hair near your surgery site. We'll remove the hair if needed.    Don't smoke or vape the morning of surgery. You may chew nicotine gum up to 2 hours before surgery. A nicotine patch is okay.  ? Note: Some surgeries require you to completely quit smoking and nicotine. Check with your surgeon.    Your care team will make every effort to keep you safe from infection. We will:  ? Clean our hands often with soap and water (or an alcohol-based hand rub).  ? Clean the skin at your surgery site with a special soap that kills germs.  ? Give you a special gown to keep you warm. (Cold raises the risk of infection.)  ? Wear special hair covers, masks, gowns and gloves during surgery.  ? Give antibiotic medicine, if prescribed. Not all surgeries need antibiotics.  What to bring on the day of surgery    Photo ID and insurance card    Copy of your health care directive, if you have one    Glasses and hearing aides (bring cases)  ? You can't wear contacts during surgery    Inhaler and eye drops, if you use them (tell us about these when  you arrive)    CPAP machine or breathing device, if you use them    A few personal items, if spending the night    If you have . . .  ? A pacemaker, ICD (cardiac defibrillator) or other implant: Bring the ID card.  ? An implanted stimulator: Bring the remote control.  ? A legal guardian: Bring a copy of the certified (court-stamped) guardianship papers.  Please remove any jewelry, including body piercings. Leave jewelry and other valuables at home.  If you're going home the day of surgery    You must have a responsible adult drive you home. They should stay with you overnight as well.    If you don't have someone to stay with you, and you aren't safe to go home alone, we may keep you overnight. Insurance often won't pay for this.  After surgery  If it's hard to control your pain or you need more pain medicine, please call your surgeon's office.  Questions?   If you have any questions for your care team, list them here: _________________________________________________________________________________________________________________________________________________________________________ ____________________________________ ____________________________________ ____________________________________  For informational purposes only. Not to replace the advice of your health care provider. Copyright   2003, 2019 [facility]. All rights reserved. Clinically reviewed by Katey Cota MD. [employer] [id number] - REV 07/21.

## 2022-09-09 NOTE — PROGRESS NOTES
[facility]  [address] [place] [address]  Phone: [phone]  Fax: [phone]  Primary Provider: Palak Mckenzie  Pre-op Performing Provider: YOLANDA DEAN      PREOPERATIVE EVALUATION:  Today's date: 9/9/2022    Levar Sesay is a 66 year old female who presents for a preoperative evaluation.    Surgical Information:  Surgery/Procedure: Lymph Node, Axillary   Surgery Location: [facility]  Surgeon: Dr. Ruslan Bahena  Surgery Date: 9/12/22  Time of Surgery: 0915  Where patient plans to recover: At home with family  Fax number for surgical facility: Note does not need to be faxed, will be available electronically in Epic.    Type of Anesthesia Anticipated: Choice    Assessment & Plan     The proposed surgical procedure is considered LOW risk.    Preop general physical exam    Axillary adenopathy      Risks and Recommendations:  The patient has the following additional risks and recommendations for perioperative complications:   - No identified additional risk factors other than previously addressed    Medication Instructions:  Patient is on no chronic medications  hold supplements now    RECOMMENDATION:  APPROVAL GIVEN to proceed with proposed procedure, without further diagnostic evaluation.      Subjective     HPI related to upcoming procedure: Axillary adenopathy    Preop Questions 9/9/2022   1. Have you ever had a heart attack or stroke? No   2. Have you ever had surgery on your heart or blood vessels, such as a stent placement, a coronary artery bypass, or surgery on an artery in your head, neck, heart, or legs? No   3. Do you have chest pain with activity? No   4. Do you have a history of  heart failure? No   5. Do you currently have a cold, bronchitis or symptoms of other infection? No   6. Do you have a cough, shortness of breath, or wheezing? No   7. Do you or anyone in your family have previous history of blood clots? No   8. Do you or does anyone in your  family have a serious bleeding problem such as prolonged bleeding following surgeries or cuts? No   9. Have you ever had problems with anemia or been told to take iron pills? YES - none since menopause    10. Have you had any abnormal blood loss such as black, tarry or bloody stools, or abnormal vaginal bleeding? No   11. Have you ever had a blood transfusion? No   12. Are you willing to have a blood transfusion if it is medically needed before, during, or after your surgery? Yes   13. Have you or any of your relatives ever had problems with anesthesia? No   14. Do you have sleep apnea, excessive snoring or daytime drowsiness? No   15. Do you have any artifical heart valves or other implanted medical devices like a pacemaker, defibrillator, or continuous glucose monitor? No   16. Do you have artificial joints? No   17. Are you allergic to latex? No       Health Care Directive:  Patient does not have a Health Care Directive or Living Will: Discussed advance care planning with patient; information given to patient to review.    Preoperative Review of :   reviewed - no record of controlled substances prescribed.      Review of Systems  CONSTITUTIONAL: NEGATIVE for fever, chills, change in weight  INTEGUMENTARY/SKIN: NEGATIVE for worrisome rashes, moles or lesions  EYES: NEGATIVE for vision changes or irritation  ENT/MOUTH: NEGATIVE for ear, mouth and throat problems  RESP: NEGATIVE for significant cough or SOB  CV: NEGATIVE for chest pain, palpitations or peripheral edema  GI: NEGATIVE for nausea, abdominal pain, heartburn, or change in bowel habits  : NEGATIVE for frequency, dysuria, or hematuria  MUSCULOSKELETAL: NEGATIVE for significant arthralgias or myalgia  NEURO: NEGATIVE for weakness, dizziness or paresthesias  ENDOCRINE: NEGATIVE for temperature intolerance, skin/hair changes  HEME: NEGATIVE for bleeding problems  PSYCHIATRIC: NEGATIVE for changes in mood or affect    Patient Active Problem List     "Diagnosis Date Noted     Postmenopausal atrophic vaginitis 2005     Priority: Medium     MENOPAUSE MENOPAUSAL SYMPTOMS 2005     Priority: Medium     Rosacea 2005     Priority: Medium      Past Medical History:   Diagnosis Date     Abnormal glandular Papanicolaou smear of cervix 2003    ASCUS; negative HPV     ASCUS of cervix with negative high risk HPV 2018    plan to repeat Pap+HPV cotesting in 3 years ()     Past Surgical History:   Procedure Laterality Date     SURGICAL HISTORY OF -       Tonsillectomy     SURGICAL HISTORY OF -   1986         SURGICAL HISTORY OF -       D & C     SURGICAL HISTORY OF -   2005    carpal tunnel surgery     Current Outpatient Medications   Medication Sig Dispense Refill     calcium carbonate 750 MG CHEW Take 750 mg by mouth daily       triamcinolone (KENALOG) 0.1 % external ointment Apply topically 2 times daily 30 g 1     MULTIPLE VITAMIN OR None Entered (Patient not taking: Reported on 2022)         Allergies   Allergen Reactions     Sulfa Drugs      Sulfa        Social History     Tobacco Use     Smoking status: Never Smoker     Smokeless tobacco: Never Used   Substance Use Topics     Alcohol use: Yes     Comment: weekends     History   Drug Use No         Objective     /72 (Cuff Size: Adult Regular)   Pulse 81   Temp 98.7  F (37.1  C) (Tympanic)   Resp 16   Ht 1.6 m (5' 3\")   Wt 68 kg (150 lb)   LMP 09/15/2003   BMI 26.57 kg/m      Physical Exam    GENERAL APPEARANCE: healthy, alert and no distress     EYES: EOMI, PERRL     HENT: ear canals and TM's normal and nose and mouth without ulcers or lesions     NECK: no adenopathy, no asymmetry, masses, or scars and thyroid normal to palpation     RESP: lungs clear to auscultation - no rales, rhonchi or wheezes     CV: regular rates and rhythm, normal S1 S2, no S3 or S4 and no murmur, click or rub     MS: extremities normal- no gross deformities noted.     SKIN: " no suspicious lesions or rashes     NEURO: Normal strength and tone, sensory exam grossly normal, mentation intact and speech normal     PSYCH: mentation appears normal. and affect normal/bright     LYMPHATICS: No cervical adenopathy    Recent Labs   Lab Test 12/23/21  1104   HGB 13.8         POTASSIUM 4.3   CR 0.53        Diagnostics:  No labs were ordered during this visit.   No EKG required, no history of coronary heart disease, significant arrhythmia, peripheral arterial disease or other structural heart disease.    Revised Cardiac Risk Index (RCRI):  The patient has the following serious cardiovascular risks for perioperative complications:   - No serious cardiac risks = 0 points     RCRI Interpretation: 0 points: Class I (very low risk - 0.4% complication rate)           Signed Electronically by: SHYANNE Kirkland CNP  Copy of this evaluation report is provided to requesting physician.

## 2022-09-09 NOTE — ANESTHESIA PREPROCEDURE EVALUATION
Anesthesia Pre-Procedure Evaluation    Patient: Levar Sesay   MRN: [id number] : 1955        Procedure : Procedure(s):  BIOPSY, LYMPH NODE, AXILLARY          Past Medical History:   Diagnosis Date     Abnormal glandular Papanicolaou smear of cervix 2003    ASCUS; negative HPV     ASCUS of cervix with negative high risk HPV 2018    plan to repeat Pap+HPV cotesting in 3 years ()      Past Surgical History:   Procedure Laterality Date     SURGICAL HISTORY OF -       Tonsillectomy     SURGICAL HISTORY OF -   1986         SURGICAL HISTORY OF -       D & C     SURGICAL HISTORY OF -   2005    carpal tunnel surgery      Allergies   Allergen Reactions     Sulfa Drugs      Sulfa      Social History     Tobacco Use     Smoking status: Never Smoker     Smokeless tobacco: Never Used   Substance Use Topics     Alcohol use: Yes     Comment: weekends      Wt Readings from Last 1 Encounters:   22 68 kg (150 lb)        Anesthesia Evaluation   Pt has had prior anesthetic. Type: General, MAC and Regional.    No history of anesthetic complications       ROS/MED HX  ENT/Pulmonary:  - neg pulmonary ROS     Neurologic:  - neg neurologic ROS     Cardiovascular:  - neg cardiovascular ROS     METS/Exercise Tolerance:     Hematologic:  - neg hematologic  ROS     Musculoskeletal:  - neg musculoskeletal ROS     GI/Hepatic:  - neg GI/hepatic ROS     Renal/Genitourinary:  - neg Renal ROS     Endo: Comment: overweight      Psychiatric/Substance Use:  - neg psychiatric ROS     Infectious Disease:  - neg infectious disease ROS     Malignancy:  - neg malignancy ROS     Other:  - neg other ROS          Physical Exam    Airway  airway exam normal           Respiratory Devices and Support         Dental  no notable dental history         Cardiovascular   cardiovascular exam normal          Pulmonary   pulmonary exam normal                OUTSIDE LABS:  CBC:   Lab Results   Component Value Date     WBC 6.3 12/23/2021    WBC 6.3 05/11/2020    HGB 13.8 12/23/2021    HGB 14.3 05/11/2020    HCT 41.8 12/23/2021    HCT 44.8 05/11/2020     12/23/2021     05/11/2020     BMP:   Lab Results   Component Value Date     12/23/2021     11/26/2018    POTASSIUM 4.3 12/23/2021    POTASSIUM 3.7 11/26/2018    CHLORIDE 106 12/23/2021    CHLORIDE 105 11/26/2018    CO2 29 12/23/2021    CO2 26 11/26/2018    BUN 10 12/23/2021    BUN 8 11/26/2018    CR 0.53 12/23/2021    CR 0.62 11/26/2018     (H) 12/23/2021    GLC 99 11/26/2018     COAGS: No results found for: PTT, INR, FIBR  POC: No results found for: BGM, HCG, HCGS  HEPATIC:   Lab Results   Component Value Date    ALBUMIN 3.5 12/23/2021    PROTTOTAL 7.9 12/23/2021    ALT 21 12/23/2021    AST 24 12/23/2021    ALKPHOS 85 12/23/2021    BILITOTAL 0.6 12/23/2021     OTHER:   Lab Results   Component Value Date    ABELINO 9.2 12/23/2021    TSH 2.54 12/23/2021    T4 0.93 11/26/2018    SED 10 12/02/2004       Anesthesia Plan    ASA Status:  2   NPO Status:  NPO Appropriate    Anesthesia Type: General.     - Airway: LMA   Induction: Intravenous.   Maintenance: Balanced.        Consents    Anesthesia Plan(s) and associated risks, benefits, and realistic alternatives discussed. Questions answered and patient/representative(s) expressed understanding.     - Discussed: Risks, Benefits and Alternatives for BOTH SEDATION and the PROCEDURE were discussed     - Discussed with:  Patient         Postoperative Care    Pain management: IV analgesics, Oral pain medications, Multi-modal analgesia.   PONV prophylaxis: Ondansetron (or other 5HT-3), Dexamethasone or Solumedrol     Comments:                SHYANNE Vance CRNA

## 2022-09-12 ENCOUNTER — HOSPITAL ENCOUNTER (OUTPATIENT)
Facility: CLINIC | Age: 67
Discharge: HOME | End: 2022-09-12
Attending: SURGERY | Admitting: SURGERY
Payer: COMMERCIAL

## 2022-09-12 ENCOUNTER — ANESTHESIA (OUTPATIENT)
Dept: SURGERY | Facility: CLINIC | Age: 67
End: 2022-09-12
Payer: COMMERCIAL

## 2022-09-12 VITALS
DIASTOLIC BLOOD PRESSURE: 67 MMHG | WEIGHT: 150 LBS | OXYGEN SATURATION: 99 % | HEART RATE: 82 BPM | RESPIRATION RATE: 18 BRPM | HEIGHT: 63 IN | BODY MASS INDEX: 26.58 KG/M2 | SYSTOLIC BLOOD PRESSURE: 120 MMHG | TEMPERATURE: 97.6 F

## 2022-09-12 DIAGNOSIS — R59.0 LYMPHADENOPATHY, AXILLARY: Primary | ICD-10-CM

## 2022-09-12 PROCEDURE — 710N000012 HC RECOVERY PHASE 2, PER MINUTE: Performed by: SURGERY

## 2022-09-12 PROCEDURE — 88189 FLOWCYTOMETRY/READ 16 & >: CPT | Performed by: STUDENT IN AN ORGANIZED HEALTH CARE EDUCATION/TRAINING PROGRAM

## 2022-09-12 PROCEDURE — 250N000011 HC RX IP 250 OP 636: Performed by: SURGERY

## 2022-09-12 PROCEDURE — 258N000003 HC RX IP 258 OP 636: Performed by: NURSE ANESTHETIST, CERTIFIED REGISTERED

## 2022-09-12 PROCEDURE — 250N000011 HC RX IP 250 OP 636: Performed by: NURSE ANESTHETIST, CERTIFIED REGISTERED

## 2022-09-12 PROCEDURE — 999N000141 HC STATISTIC PRE-PROCEDURE NURSING ASSESSMENT: Performed by: SURGERY

## 2022-09-12 PROCEDURE — 360N000074 HC SURGERY LEVEL 1, PER MIN: Performed by: SURGERY

## 2022-09-12 PROCEDURE — 370N000017 HC ANESTHESIA TECHNICAL FEE, PER MIN: Performed by: SURGERY

## 2022-09-12 PROCEDURE — 272N000001 HC OR GENERAL SUPPLY STERILE: Performed by: SURGERY

## 2022-09-12 PROCEDURE — 38525 BIOPSY/REMOVAL LYMPH NODES: CPT | Mod: RT | Performed by: SURGERY

## 2022-09-12 PROCEDURE — 250N000013 HC RX MED GY IP 250 OP 250 PS 637: Performed by: NURSE ANESTHETIST, CERTIFIED REGISTERED

## 2022-09-12 PROCEDURE — 250N000009 HC RX 250: Performed by: SURGERY

## 2022-09-12 PROCEDURE — 250N000009 HC RX 250: Performed by: NURSE ANESTHETIST, CERTIFIED REGISTERED

## 2022-09-12 PROCEDURE — 250N000025 HC SEVOFLURANE, PER MIN: Performed by: SURGERY

## 2022-09-12 PROCEDURE — 88185 FLOWCYTOMETRY/TC ADD-ON: CPT | Performed by: PATHOLOGY

## 2022-09-12 PROCEDURE — 88305 TISSUE EXAM BY PATHOLOGIST: CPT | Mod: TC | Performed by: SURGERY

## 2022-09-12 RX ORDER — FENTANYL CITRATE 50 UG/ML
INJECTION, SOLUTION INTRAMUSCULAR; INTRAVENOUS PRN
Status: DISCONTINUED | OUTPATIENT
Start: 2022-09-12 | End: 2022-09-12

## 2022-09-12 RX ORDER — GLYCOPYRROLATE 0.2 MG/ML
INJECTION, SOLUTION INTRAMUSCULAR; INTRAVENOUS PRN
Status: DISCONTINUED | OUTPATIENT
Start: 2022-09-12 | End: 2022-09-12

## 2022-09-12 RX ORDER — NALOXONE HYDROCHLORIDE 0.4 MG/ML
0.2 INJECTION, SOLUTION INTRAMUSCULAR; INTRAVENOUS; SUBCUTANEOUS
Status: DISCONTINUED | OUTPATIENT
Start: 2022-09-12 | End: 2022-09-12 | Stop reason: HOSPADM

## 2022-09-12 RX ORDER — SODIUM CHLORIDE, SODIUM LACTATE, POTASSIUM CHLORIDE, CALCIUM CHLORIDE 600; 310; 30; 20 MG/100ML; MG/100ML; MG/100ML; MG/100ML
INJECTION, SOLUTION INTRAVENOUS CONTINUOUS
Status: DISCONTINUED | OUTPATIENT
Start: 2022-09-12 | End: 2022-09-12 | Stop reason: HOSPADM

## 2022-09-12 RX ORDER — OXYCODONE HYDROCHLORIDE 5 MG/1
5 TABLET ORAL EVERY 4 HOURS PRN
Status: DISCONTINUED | OUTPATIENT
Start: 2022-09-12 | End: 2022-09-12 | Stop reason: HOSPADM

## 2022-09-12 RX ORDER — CEFAZOLIN SODIUM/WATER 2 G/20 ML
2 SYRINGE (ML) INTRAVENOUS SEE ADMIN INSTRUCTIONS
Status: DISCONTINUED | OUTPATIENT
Start: 2022-09-12 | End: 2022-09-12 | Stop reason: HOSPADM

## 2022-09-12 RX ORDER — FENTANYL CITRATE 50 UG/ML
25 INJECTION, SOLUTION INTRAMUSCULAR; INTRAVENOUS EVERY 5 MIN PRN
Status: DISCONTINUED | OUTPATIENT
Start: 2022-09-12 | End: 2022-09-12 | Stop reason: HOSPADM

## 2022-09-12 RX ORDER — LIDOCAINE 40 MG/G
CREAM TOPICAL
Status: DISCONTINUED | OUTPATIENT
Start: 2022-09-12 | End: 2022-09-12 | Stop reason: HOSPADM

## 2022-09-12 RX ORDER — HYDROMORPHONE HCL IN WATER/PF 6 MG/30 ML
0.2 PATIENT CONTROLLED ANALGESIA SYRINGE INTRAVENOUS EVERY 5 MIN PRN
Status: DISCONTINUED | OUTPATIENT
Start: 2022-09-12 | End: 2022-09-12 | Stop reason: HOSPADM

## 2022-09-12 RX ORDER — MEPERIDINE HYDROCHLORIDE 25 MG/ML
12.5 INJECTION INTRAMUSCULAR; INTRAVENOUS; SUBCUTANEOUS
Status: DISCONTINUED | OUTPATIENT
Start: 2022-09-12 | End: 2022-09-12 | Stop reason: HOSPADM

## 2022-09-12 RX ORDER — BUPIVACAINE HYDROCHLORIDE 5 MG/ML
INJECTION, SOLUTION PERINEURAL PRN
Status: DISCONTINUED | OUTPATIENT
Start: 2022-09-12 | End: 2022-09-12 | Stop reason: HOSPADM

## 2022-09-12 RX ORDER — ACETAMINOPHEN 325 MG/1
975 TABLET ORAL ONCE
Status: COMPLETED | OUTPATIENT
Start: 2022-09-12 | End: 2022-09-12

## 2022-09-12 RX ORDER — ONDANSETRON 4 MG/1
4 TABLET, ORALLY DISINTEGRATING ORAL EVERY 30 MIN PRN
Status: DISCONTINUED | OUTPATIENT
Start: 2022-09-12 | End: 2022-09-12 | Stop reason: HOSPADM

## 2022-09-12 RX ORDER — CEFAZOLIN SODIUM/WATER 2 G/20 ML
2 SYRINGE (ML) INTRAVENOUS
Status: COMPLETED | OUTPATIENT
Start: 2022-09-12 | End: 2022-09-12

## 2022-09-12 RX ORDER — NALOXONE HYDROCHLORIDE 0.4 MG/ML
0.4 INJECTION, SOLUTION INTRAMUSCULAR; INTRAVENOUS; SUBCUTANEOUS
Status: DISCONTINUED | OUTPATIENT
Start: 2022-09-12 | End: 2022-09-12 | Stop reason: HOSPADM

## 2022-09-12 RX ORDER — MAGNESIUM SULFATE HEPTAHYDRATE 40 MG/ML
2 INJECTION, SOLUTION INTRAVENOUS ONCE
Status: COMPLETED | OUTPATIENT
Start: 2022-09-12 | End: 2022-09-12

## 2022-09-12 RX ORDER — ONDANSETRON 2 MG/ML
4 INJECTION INTRAMUSCULAR; INTRAVENOUS EVERY 30 MIN PRN
Status: DISCONTINUED | OUTPATIENT
Start: 2022-09-12 | End: 2022-09-12 | Stop reason: HOSPADM

## 2022-09-12 RX ORDER — LIDOCAINE HYDROCHLORIDE 10 MG/ML
INJECTION, SOLUTION INFILTRATION; PERINEURAL PRN
Status: DISCONTINUED | OUTPATIENT
Start: 2022-09-12 | End: 2022-09-12

## 2022-09-12 RX ORDER — DOCUSATE SODIUM 100 MG/1
100 CAPSULE, LIQUID FILLED ORAL 2 TIMES DAILY
Refills: 0 | COMMUNITY
Start: 2022-09-12 | End: 2023-12-28

## 2022-09-12 RX ORDER — PROPOFOL 10 MG/ML
INJECTION, EMULSION INTRAVENOUS PRN
Status: DISCONTINUED | OUTPATIENT
Start: 2022-09-12 | End: 2022-09-12

## 2022-09-12 RX ORDER — OXYCODONE HYDROCHLORIDE 5 MG/1
5 TABLET ORAL EVERY 6 HOURS PRN
Qty: 12 TABLET | Refills: 0 | Status: SHIPPED | OUTPATIENT
Start: 2022-09-12 | End: 2022-09-15

## 2022-09-12 RX ORDER — LIDOCAINE HYDROCHLORIDE AND EPINEPHRINE 10; 10 MG/ML; UG/ML
INJECTION, SOLUTION INFILTRATION; PERINEURAL PRN
Status: DISCONTINUED | OUTPATIENT
Start: 2022-09-12 | End: 2022-09-12 | Stop reason: HOSPADM

## 2022-09-12 RX ORDER — FENTANYL CITRATE 50 UG/ML
25 INJECTION, SOLUTION INTRAMUSCULAR; INTRAVENOUS
Status: DISCONTINUED | OUTPATIENT
Start: 2022-09-12 | End: 2022-09-12 | Stop reason: HOSPADM

## 2022-09-12 RX ADMIN — FENTANYL CITRATE 100 MCG: 50 INJECTION, SOLUTION INTRAMUSCULAR; INTRAVENOUS at 09:01

## 2022-09-12 RX ADMIN — PROPOFOL 200 MG: 10 INJECTION, EMULSION INTRAVENOUS at 09:05

## 2022-09-12 RX ADMIN — GLYCOPYRROLATE 0.3 MG: 0.2 INJECTION, SOLUTION INTRAMUSCULAR; INTRAVENOUS at 09:05

## 2022-09-12 RX ADMIN — PHENYLEPHRINE HYDROCHLORIDE 200 MCG: 10 INJECTION INTRAVENOUS at 09:34

## 2022-09-12 RX ADMIN — MAGNESIUM SULFATE HEPTAHYDRATE 2 G: 40 INJECTION, SOLUTION INTRAVENOUS at 09:01

## 2022-09-12 RX ADMIN — PHENYLEPHRINE HYDROCHLORIDE 200 MCG: 10 INJECTION INTRAVENOUS at 09:23

## 2022-09-12 RX ADMIN — MIDAZOLAM 2 MG: 1 INJECTION INTRAMUSCULAR; INTRAVENOUS at 09:01

## 2022-09-12 RX ADMIN — PHENYLEPHRINE HYDROCHLORIDE 200 MCG: 10 INJECTION INTRAVENOUS at 09:15

## 2022-09-12 RX ADMIN — Medication 2 G: at 08:58

## 2022-09-12 RX ADMIN — SODIUM CHLORIDE, POTASSIUM CHLORIDE, SODIUM LACTATE AND CALCIUM CHLORIDE: 600; 310; 30; 20 INJECTION, SOLUTION INTRAVENOUS at 08:51

## 2022-09-12 RX ADMIN — ACETAMINOPHEN 975 MG: 325 TABLET, FILM COATED ORAL at 08:52

## 2022-09-12 RX ADMIN — PHENYLEPHRINE HYDROCHLORIDE 200 MCG: 10 INJECTION INTRAVENOUS at 09:26

## 2022-09-12 RX ADMIN — LIDOCAINE HYDROCHLORIDE 5 ML: 10 INJECTION, SOLUTION INFILTRATION; PERINEURAL at 09:05

## 2022-09-12 ASSESSMENT — ACTIVITIES OF DAILY LIVING (ADL)
ADLS_ACUITY_SCORE: 35
ADLS_ACUITY_SCORE: 35

## 2022-09-12 NOTE — DISCHARGE INSTRUCTIONS
"HOME CARE FOLLOWING MASS EXCISION      DIET:  No restrictions.  Increased fluid intake is recommended. While taking pain medications, increase dietary fiber or add a fiber supplementation like Metamucil or Citrucel to help prevent constipation - a possible side effect of pain medications.    NAUSEA:  If nauseated from the anesthetic/pain meds; rest in bed, get up cautiously with assistance, and drink clear liquids (juice, tea, broth).    ACTIVITY:  Light Activity -- you may immediately be up and about as tolerated.  Driving -- you may drive when comfortable and off pain medications.  Light Work -- resume when comfortable off pain medications.  (If you can drive, you probably can work.)  Resume Regular Activity As tolerated    INCISIONAL CARE:  If you have a dressing in place, keep clean and dry for 24 hours after surgery.  After this timeframe, you may replace the gauze daily if it becomes soiled.  You may remove the dressing and shower 48 hours after surgery.  Do not submerse incision in water for 1 week.  If you have a Dermabond dressing (a type of skin glue), you may shower immediately.  Sutures will absorb and need not be removed.  If present, leave the steri-strips (white paper tapes) in place for 14 days after surgery.  If present, leave Dermabond glue in place until it wears/flakes off.  Expect a variable amount of swelling/bruising/discoloration that may appear around or below the repair site.  Some numbness around the incision is common.  A lump/\"healing ridge\" under the incision is normal and will gradually resolve over the following 1-2 months.    DISCOMFORT:  Local anesthetic placed at surgery should provide relief for 4-8 hours.  Begin taking pain pills before discomfort is severe.  Take the pain medication with some food, when possible, to minimize side effects.  Intermittent use of ice packs to the hernia repair site may help during the first 1-3 weeks after surgery.  Expect gradual improvement.  "   Over-the-counter anti-inflammatory medications (i.e. Ibuprofen/Advil/Motrin or Naprosyn/Aleve) may be used per package instructions in addition to or while tapering off the narcotic pain medications to decrease swelling and sensitivity at the repair site.  DO NOT TAKE these Anti-inflammatory medications if your primary physician has advised against doing so, or if you have acid reflux, ulcer, or bleeding disorder, or take blood-thinner medications.  Call your primary physician or the surgery office if you have medication questions.      RETURN APPOINTMENT:  Schedule a follow-up visit 2 weeks post-op.  Office Phone:  [phone]     CONTACT US IF THE FOLLOWING DEVELOPS:   1. A fever that is above 101     2. If there is a large amount of drainage, bleeding, or swelling.   3. Severe pain that is not relieved by your prescription.   4. Drainage that is thick, cloudy, yellow, green or white.   5. Any other questions not answered by  Frequently Asked Questions  sheet.      FREQUENTLY ASKED QUESTIONS:    Q:  How should my incision look?    A:  Normally your incision will appear slightly swollen with light redness directly along the incision itself as it heals.  It may feel like a bump or ridge as the healing/scarring happens, and over time (3-4 months) this bump or ridge feeling should slowly go away.  In general, clear or pink watery drainage can be normal at first as your incision heals, but should decrease over time.    Q:  How do I know if my incision is infected?  A:  Look at your incision for signs of infection, like redness around the incision spreading to surrounding skin, or drainage of cloudy or foul-smelling drainage.  If you feel warm, check your temperature to see if you are running a fever.    **If any of these things occur, please notify the nurse at our office.  We may need you to come into the office for an incision check.      Q:  How do I take care of my incision?  A:  If you have a dressing in place  - Starting the day after surgery, replace the dressing 1-2 times a day until there is no further drainage from the incision.  At that time, a dressing is no longer needed.  Try to minimize tape on the skin if irritation is occurring at the tape sites.  If you have significant irritation from tape on the skin, please call the office to discuss other method of dressing your incision.    Small pieces of tape called  steri-strips  may be present directly overlying your incision; these may be removed 10 days after surgery unless otherwise specified by your surgeon.  If these tapes start to loosen at the ends, you may trim them back until they fall off or are removed.    A:  If you had  Dermabond  tissue glue used as a dressing (this causes your incision to look shiny with a clear covering over it) - This type of dressing wears off with time and does not require more dressings over the top unless it is draining around the glue as it wears off.  Do not apply ointments or lotions over the incisions until the glue has completely worn off.    Q:  There is a piece of tape or a sticky  lead  still on my skin.  Can I remove this?  A:  Sometimes the sticky  leads  used for monitoring during surgery or for evaluation in the emergency department are not all removed while you are in the hospital.  These sometimes have a tab or metal dot on them.  You can easily remove these on your own, like taking off a band-aid.  If there is a gel substance under the  lead , simply wipe/clean it off with a washcloth or paper towel.      Q:  What can I do to minimize constipation (very hard stools, or lack of stools)?  A:  Stay well hydrated.  Increase your dietary fiber intake or take a fiber supplement -with plenty of water.  Walk around frequently.  You may consider an over-the-counter stool-softener.  Your Pharmacist can assist you with choosing one that is stocked at your pharmacy.  Constipation is also one of the most common side effects of  pain medication.  If you are using pain medication, be pro-active and try to PREVENT problems with constipation by taking the steps above BEFORE constipation becomes a problem.    Q:  What do I do if I need more pain medications?  A:  Call the office to receive refills.  Be aware that certain pain meds cannot be called into a pharmacy and actually require a paper prescription.  A change may be made in your pain med as you progress thru your recovery period or if you have side effects to certain meds.    --Pain meds are NOT refilled after 5pm on weekdays, and NOT AT ALL on the weekends, so please look ahead to prevent problems.      Q:  Why am I having a hard time sleeping now that I am at home?  A:  Many medications you receive while you are in the hospital can impact your sleep for a number of days after your surgery/hospitalization.  Decreased level of activity and naps during the day may also make sleeping at night difficult.  Try to minimize day-time naps, and get up frequently during the day to walk around your home during your recovery time.  Sleep aides may be of some help, but are not recommended for long-term use.      Q:  I am having some back discomfort.  What should I do?  A:  This may be related to certain positioning that was required for your surgery, extended periods of time in bed, or other changes in your overall activity level.  You may try ice, heat, acetaminophen, or ibuprofen to treat this temporarily.  Note that many pain medications have acetaminophen in them and would state this on the prescription bottle.  Be sure not to exceed the maximum of 4000mg per day of acetaminophen.     **If the pain you are having does not resolve, is severe, or is a flare of back pain you have had on other occasions prior to surgery, please contact your primary physician for further recommendations or for an appointment to be examined at their office.    Q:  Why am I having headaches?  A:  Headaches can be caused  by many things:  caffeine withdrawal, use of pain meds, dehydration, high blood pressure, lack of sleep, over-activity/exhaustion, flare-up of usual migraine headaches.  If you feel this is related to muscle tension (a band-like feeling around the head, or a pressure at the low-back of the head) you may try ice or heat to this area.  You may need to drink more fluids (try electrolyte drink like Gatorade), rest, or take your usual migraine medications.   **If your headaches do not resolve, worsen, are accompanied by other symptoms, or if your blood pressure is high, please call your primary physician for recommendation and/or examination.    Q:  I am unable to urinate.  What do I do?  A:  A small percentage of people can have difficulty urinating initially after surgery.  This includes being able to urinate only a very small amount at a time and feeling discomfort or pressure in the very low abdomen.  This is called  urinary retention , and is actually an urgent situation.  Proceed to your nearest Emergency department for evaluation (not an Urgent Care Center).  Sometimes the bladder does not work correctly after certain medications you receive during surgery, or related to certain procedures.  You may need to have a catheter placed until your bladder recovers.  When planning to go to an Emergency department, it may help to call the ER to let them know you are coming in for this problem after a surgery.  This may help you get in quicker to be evaluated.  **If you have symptoms of a urinary tract infection, please contact your primary physician for the proper evaluation and treatment.          If you have other questions, please call the office Monday thru Friday between 8am and 5pm to discuss with the nurse.  #[phone]    There is a surgeon ON CALL on weekday evenings and over the weekend in case of urgent need only, and may be contacted at the same number.    If you are having an emergency, call 911 or proceed  to your nearest emergency department.                          Same Day Surgery Discharge Instructions  Special Precautions After Surgery - Adult    It is not unusual to feel lightheaded or faint, up to 24 hours after surgery or while taking pain medication.  If you have these symptoms; sit for a few minutes before standing and have someone assist you when getting up.  You should rest and relax for the next 24 hours and must have someone stay with you for at least 24 hours after your discharge.  DO NOT DRIVE any vehicle or operate mechanical equipment for 24 hours following the end of your surgery.  DO NOT DRIVE while taking narcotic pain medications that have been prescribed by your physician.  If you had a limb operated on, you must be able to use it fully to drive.  DO NOT drink alcoholic beverages for 24 hours following surgery or while taking prescription pain medication.  Drink clear liquids (apple juice, ginger ale, broth, 7-Up, etc.).  Progress to your regular diet as you feel able.  Any questions call your physician and do not make important decisions for 24 hours.    ACTIVITY as per surgeon       INCISIONAL CARE  monitor for signs and symptoms of infections       Call for an appointment to return to the clinic     Medications:  As prescribed       __________________________________________________________________________________________________________________________________  IMPORTANT NUMBERS:    [facility] Main Number:  [phone], [phone]  Pharmacy:  [phone]  Same Day Surgery:  [phone], Monday - Friday until 8:30 p.m.  Urgent Care:  [phone]  Emergency Room:  [phone]      [facility]:  [phone]                                                                             [facility]:  [phone] option 1  [facility]:  [phone]     [facility]:  [phone]   [facility]:  [phone]   Home Medical Equipment:  [phone]  [facility]:  [phone]

## 2022-09-12 NOTE — ANESTHESIA POSTPROCEDURE EVALUATION
Patient: Levar Sesay    Procedure: Procedure(s):  Right axillary excisional lymph node dissection       Anesthesia Type:  General    Note:  Disposition: Outpatient   Postop Pain Control: Uneventful            Sign Out: Well controlled pain   PONV: No   Neuro/Psych: Uneventful            Sign Out: Acceptable/Baseline neuro status   Airway/Respiratory: Uneventful            Sign Out: Acceptable/Baseline resp. status   CV/Hemodynamics: Uneventful            Sign Out: Acceptable CV status; No obvious hypovolemia; No obvious fluid overload   Other NRE: NONE   DID A NON-ROUTINE EVENT OCCUR? No           Last vitals:  Vitals:    09/12/22 0810   BP: (!) 153/79   Pulse: 70   Resp: 16   Temp: (!) 8.2  C (46.8  F)   SpO2: 99%       Electronically Signed By: SHYANNE Calabrese CRNA  September 12, 2022  10:06 AM

## 2022-09-12 NOTE — OP NOTE
Procedure Date: 09/12/22     PREOPERATIVE DIAGNOSIS: 1. Right axillary lymphadenopathy    POSTOPERATIVE DIAGNOSIS: Same     PROCEDURE: 1. Excisional biopsy of right axillary lymph node    ATTENDING SURGEON: Ruslan Bahena DO    ESTIMATED BLOOD LOSS: 3mL    ANESTHESIA: General LMA plus Local Anesthesia (0.25% Marcaine and 1% Lidocaine in a 50:50 mixture)     INDICATIONS FOR PROCEDURE: : Levar Sesay presents with a history of persistent right axillary lymphadenopathy, she had a core biopsy which was benign, however given size, discomfort and potential for lymphoma she wished to proceed with excisional biopsy. After discussion was held with the patient regarding indications, risks, benefits and alternatives, benefits, and risks, her questions were addressed and she understood and wished to proceed with right axillary lymph node excisional biopsy. Specific risks discussed included bleeding, infection, seroma, need for additional treatment, nontherapeutic intervention, wound complication (such as dehiscence), recurrence, cosmetic deformity, potential need for additional surgical resection, potential for drain placement, lymphedema, and rare complications related to surgery and/or anesthesia such as venous thromboembolism and cardiorespiratory complications.     PROCEDURE: After informed consent was obtained, the patient was brought to the operating room and placed in the supine position on the Operating Room table. Anesthesia was then administered. The right  Axilla was then prepped and draped in the typical sterile fashion. A time-out was performed to verify patient and procedure.      Local anesthetic was delivered, and an incision was carried out over the right axillary palpable lymph node. The subcutaneous tissue was carefully dissected using electrocautery, to expose the axillary basin. The node was rubbery and mobile. This was dissected free between hemoclips and cautery where appropriate.  A 15 blade was  used to remove a section for RPMI staining for pathology, the rest was submitted in formalin. Hemostasis was assured. Sterile water irrigant was used, and the irrigant solution suctioned free.      An instrument count, including laparotomy pads, sponges and needles was performed and found to be correct.       The deep axillary space was closed with 3-0 Vicryl.  subcutaneous tissue was closed in layers using interrupted 3-0 Vicryl suture at both the breast and axillary wounds, and skin was closed using a subcuticular suture of 4-0 Monocryl. The skin was cleansed and dried, before administration of Dermabond glue.   The patient tolerated the procedure well, was allowed to recover and was transferred to the recovery room in stable condition.    Ruslan Bahena DO on 9/12/2022 at 9:47 AM

## 2022-09-12 NOTE — ANESTHESIA CARE TRANSFER NOTE
Patient: Levar Sesay    Procedure: Procedure(s):  Right axillary excisional lymph node dissection       Diagnosis: Axillary adenopathy [R59.0]  Diagnosis Additional Information: No value filed.    Anesthesia Type:   General     Note:    Oropharynx: oropharynx clear of all foreign objects and spontaneously breathing  Level of Consciousness: awake  Oxygen Supplementation: nasal cannula  Level of Supplemental Oxygen (L/min / FiO2): 2  Independent Airway: airway patency satisfactory and stable  Dentition: dentition unchanged  Vital Signs Stable: post-procedure vital signs reviewed and stable  Report to RN Given: handoff report given  Patient transferred to: Phase II    Handoff Report: Identifed the Patient, Identified the Reponsible Provider, Reviewed the pertinent medical history, Discussed the surgical course, Reviewed Intra-OP anesthesia mangement and issues during anesthesia, Set expectations for post-procedure period and Allowed opportunity for questions and acknowledgement of understanding      Vitals:  Vitals Value Taken Time   /79 09/12/22 1003   Temp     Pulse 80 09/12/22 1003   Resp     SpO2 99 % 09/12/22 1003   Vitals shown include unvalidated device data.    Electronically Signed By: SHYANNE Calabrese CRNA  September 12, 2022  10:05 AM

## 2022-09-12 NOTE — INTERVAL H&P NOTE
"I have reviewed the surgical (or preoperative) H&P that is linked to this encounter, and examined the patient. There are no significant changes    Clinical Conditions Present on Arrival:  Clinically Significant Risk Factors Present on Admission                   # Overweight: Estimated body mass index is 26.57 kg/m  as calculated from the following:    Height as of this encounter: 1.6 m (5' 3\").    Weight as of this encounter: 68 kg (150 lb).       " pt has potential for respiratory failure.  needs unit admission.  In my opinion, in patient treatment is medically justifiable and appropriate.

## 2022-09-14 LAB
PATH REPORT.COMMENTS IMP SPEC: NORMAL
PATH REPORT.FINAL DX SPEC: NORMAL
PATH REPORT.MICROSCOPIC SPEC OTHER STN: NORMAL
PATH REPORT.RELEVANT HX SPEC: NORMAL

## 2022-09-16 PROCEDURE — 88341 IMHCHEM/IMCYTCHM EA ADD ANTB: CPT | Mod: 26 | Performed by: PATHOLOGY

## 2022-09-16 PROCEDURE — 88161 CYTOPATH SMEAR OTHER SOURCE: CPT | Mod: 26 | Performed by: PATHOLOGY

## 2022-09-16 PROCEDURE — 88342 IMHCHEM/IMCYTCHM 1ST ANTB: CPT | Mod: 26 | Performed by: PATHOLOGY

## 2022-09-16 PROCEDURE — 88307 TISSUE EXAM BY PATHOLOGIST: CPT | Mod: 26 | Performed by: PATHOLOGY

## 2022-09-19 LAB
PATH REPORT.ADDENDUM SPEC: NORMAL
PATH REPORT.COMMENTS IMP SPEC: NORMAL
PATH REPORT.FINAL DX SPEC: NORMAL
PATH REPORT.GROSS SPEC: NORMAL
PATH REPORT.MICROSCOPIC SPEC OTHER STN: NORMAL
PATH REPORT.MICROSCOPIC SPEC OTHER STN: NORMAL
PATH REPORT.RELEVANT HX SPEC: NORMAL
PATH REPORT.SUPPLEMENTAL REPORTS SPEC: NORMAL
PHOTO IMAGE: NORMAL

## 2022-09-27 ENCOUNTER — OFFICE VISIT (OUTPATIENT)
Dept: SURGERY | Facility: CLINIC | Age: 67
End: 2022-09-27
Payer: COMMERCIAL

## 2022-09-27 VITALS
SYSTOLIC BLOOD PRESSURE: 132 MMHG | DIASTOLIC BLOOD PRESSURE: 88 MMHG | WEIGHT: 148 LBS | OXYGEN SATURATION: 99 % | BODY MASS INDEX: 26.22 KG/M2 | HEART RATE: 105 BPM

## 2022-09-27 DIAGNOSIS — R59.0 LYMPHADENOPATHY, AXILLARY: Primary | ICD-10-CM

## 2022-09-27 PROCEDURE — 99024 POSTOP FOLLOW-UP VISIT: CPT | Performed by: SURGERY

## 2022-09-27 NOTE — PROGRESS NOTES
General Surgery Post Op    Pt returns for follow up visit s/p lymph node excision of right axilla on 9/12/22.    Patient has been doing well, tolerating diet. Bowels moving well. Pain controlled.. No fevers.  She had some swelling at surgical excision site.  It has reduced with massage and ice over the weekend.      Physical exam: Vitals: /88 (BP Location: Right arm, Patient Position: Sitting, Cuff Size: Adult Large)   Pulse 105   Wt 67.1 kg (148 lb)   LMP 09/15/2003   SpO2 99%   BMI 26.22 kg/m    BMI= Body mass index is 26.22 kg/m .    Exam:  Constitutional: healthy, alert and no distress  Cardiovascular: negative  Respiratory: negative  Small seroma right axilla.  No overlying skin changes.    Path:  Right axillary lymph nodes, two, excision:  - Mild follicular hyperplasia.  - Mild neutrophil infiltrate in sinusoids and subcapsular spaces.  - Negative for malignancy.     A. Lymph Node(s), Axillary, Right:  -Polytypic B cells  -No aberrant immunophenotype on T cells  -See comment      Assessment:     ICD-10-CM    1. Lymphadenopathy, axillary  R59.0 Adult Oncology/Hematology  Referral     Plan: Levar Sesay was seen for follow-up after right axillary lymph node excision.  Patient is doing well and recovering without issue at this time.  We reviewed the pathology which was benign--there was a comment of inability to rule out NHL, will refer to oncology to discuss though patient has no symptoms of lymphoma.  We discussed routine post-operative care of wounds including avoiding sun exposure to wounds to limit scarring, no need for overlying ointments, and weight restrictions going forward.  Patient was instructed to call with any questions or concerns.  Levar Sesay can follow-up on an as needed basis.    Ruslan Bahena, DO on 9/27/2022 at 9:59 AM

## 2022-09-27 NOTE — LETTER
9/27/2022         RE: Levar Sesay  [id number] [facility] [id number]        Dear Colleague,    Thank you for referring your patient, Levar Sesay, to the [facility]. Please see a copy of my visit note below.    General Surgery Post Op    Pt returns for follow up visit s/p lymph node excision of right axilla on 9/12/22.    Patient has been doing well, tolerating diet. Bowels moving well. Pain controlled.. No fevers.  She had some swelling at surgical excision site.  It has reduced with massage and ice over the weekend.      Physical exam: Vitals: /88 (BP Location: Right arm, Patient Position: Sitting, Cuff Size: Adult Large)   Pulse 105   Wt 67.1 kg (148 lb)   LMP 09/15/2003   SpO2 99%   BMI 26.22 kg/m    BMI= Body mass index is 26.22 kg/m .    Exam:  Constitutional: healthy, alert and no distress  Cardiovascular: negative  Respiratory: negative  Small seroma right axilla.  No overlying skin changes.    Path:  Right axillary lymph nodes, two, excision:  - Mild follicular hyperplasia.  - Mild neutrophil infiltrate in sinusoids and subcapsular spaces.  - Negative for malignancy.     A. Lymph Node(s), Axillary, Right:  -Polytypic B cells  -No aberrant immunophenotype on T cells  -See comment      Assessment:     ICD-10-CM    1. Lymphadenopathy, axillary  R59.0 Adult Oncology/Hematology  Referral     Plan: Levar Sesay was seen for follow-up after right axillary lymph node excision.  Patient is doing well and recovering without issue at this time.  We reviewed the pathology which was benign--there was a comment of inability to rule out NHL, will refer to oncology to discuss though patient has no symptoms of lymphoma.  We discussed routine post-operative care of wounds including avoiding sun exposure to wounds to limit scarring, no need for overlying ointments, and weight restrictions going forward.  Patient was instructed to call with any questions or  concerns.  Levar Sesay can follow-up on an as needed basis.    Ruslan Bahena, DO on 9/27/2022 at 9:59 AM          Again, thank you for allowing me to participate in the care of your patient.        Sincerely,        Ruslan Bahena, DO

## 2022-09-27 NOTE — NURSING NOTE
Chief Complaint   Patient presents with     Post-op Visit       Vitals:    09/27/22 0959   Pulse: 105   SpO2: 99%   Weight: 67.1 kg (148 lb)     Wt Readings from Last 1 Encounters:   09/27/22 67.1 kg (148 lb)     Farideh Hall MA

## 2022-10-04 NOTE — PROGRESS NOTES
[facility]    Hematology/Oncology New Patient Note      Today's Date: 10/5/2022    Reason for Consultation: right axillary lymphadenopathy  Referring Provider: Ruslan Bahena DO      HISTORY OF PRESENT ILLNESS: Levar Sesay is a 67 year old female who was referred to the Hematology/Oncology Clinic for right axillary lymphadenopathy.    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    -2012- pt first noted enlarged right axillary lymph node, did not pursue medical evaluation 2/2 insurance issues    -1/2015- right axillary ultrasound- normal-appearing lymph nodes are seen in right axilla, largest node measures about 2.5 cm however the node is of normal morphology with normal fatty shayna.  No suspicious masses or other abnormalities are identified.    -5/2020- bilateral mammogram with right axillary ultrasound-benign appearing lymph node with large fatty shayna, largest 1 measures 1 cm in short axis dimension    - 12/2021- bilateral mammogram with right axillary ultrasound- multiple benign-appearing lymph nodes are seen in the bilateral axillary regions.  1 in the left axilla more prominent than those on the right.  Ultrasound of the right axilla at site of symptoms (patient reports enlarging lymph node), demonstrates mildly prominent lymph node measuring 2.9 x 1.7 x 1.0 cm with a fatty hilum and not appreciably changed in size since the prior study 5/2020.    - 7/2022-right axillary ultrasound- prominent lymph node corresponds with palpable finding measures 0.9 x 3.0 x 1.6 cm with a cortex measuring 0.6 cm in thickness, likely represents normal reactive lymph node although it is not decreased in prominence since prior study 12/2021.    - 7/2022-ultrasound-guided right axillary lymph node  core biopsy- 6 samples obtained with 18-gauge core cutting needle, pathology and flow cytometry full details below and summarized as:  PATHOLOGY  ---no malignancy, granulomas, necrosis  ---increased neutrophils in subcapsular and sinusoidal spaces  ---retained architecture, appropriate stains of T-cells, B-cells, germinal center lymphocytes, follicular dendritic cells   -- CD15 and CD30 stains do not support the presence of Hodgkin/Pascual-Marie cells.  FLOW CYTOMETRY  -Polytypic B cells  -No aberrant immunophenotype on T cells; increased CD4:CD8 ratio among T cells  -no immunophenotypic evidence of non-Hodgkin lymphoma    -9/2022- right axillary lymph node excision of 2 lymph nodes, pathology and flow cytometry full details below and summarized as:  PATHOLOGY  --mild follicular hyperplasia  --mild neutrophil infiltrate in subcapsular and sinusoidal spaces, raises the possibility of an inflammatory or reactive etiology.  --negative for malignancy  --morphologic and IHC similar to previous (both lymph nodes normal architecture is preserved.  There is mild follicular hyperplasia. Sinuses are patent with mild acute inflammation intermixed with benign histocytes and lymphocytes. There are scattered plasma cells. There is no evidence of Hodgkin or non-Hodgkin lymphoma, granulomas, necrosis or metastatic tumor. Immunostains appropriate)   FLOW CYTOMETRY  -Polytypic B cells  -No aberrant immunophenotype on T cells    Pt denies weight loss, lymphadenopathy, night sweats. Cancer screening hx includes mammogram in 12/2021 (Heterogeneously dense, limiting mammographic sensitivity), pap smear (results from 11/2018 show ASCUS w/transformation zone present), colonoscopy in [facility] 1/2015 (normal looking colon). Arthritis in b/l hands particularly thumbs. Pt denies significant right knee or leg pain, at site of endochondroma- pt reports she had some f/u with XRays after right knee surgery but was told she  didn't need further f/u. Pt reports some tobacco use in high school but not otherwise. 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     Family history-  Father- lung cancer diagnosed around age 75 s/p chemotherapy and subsequently passed 4-5 years after diagnosis, +heavy smoker  Half sister- lung cancer diagnosis around mid-50s s/p chemotherapy, +smoker   Mother- passed at age 90 2/2 PE, skin cancer ?related to radiation tx in younger years for acne  5 other sisters- no hx of cancer  Maternal GM- uterine CA  Paternal GM- uterine CA  No hx of lymphoma, leukemias      REVIEW OF SYSTEMS:   A 14 point ROS was reviewed with pertinent positives and negatives in the HPI.        HOME MEDICATIONS:  Current Outpatient Medications   Medication Sig Dispense Refill     calcium carbonate 750 MG CHEW Take 750 mg by mouth daily       docusate sodium (COLACE) 100 MG capsule Take 1 capsule (100 mg) by mouth 2 times daily Take while on opiate to prevent constipation  0     MULTIPLE VITAMIN OR None Entered (Patient not taking: No sig reported)       triamcinolone (KENALOG) 0.1 % external ointment Apply topically 2 times daily 30 g 1         ALLERGIES:  Allergies   Allergen Reactions     Sulfa Drugs      Sulfa         PAST MEDICAL HISTORY:  Past Medical History:   Diagnosis Date     Abnormal glandular Papanicolaou smear of cervix 2003    ASCUS; negative HPV     ASCUS of cervix with negative high risk HPV 2018    plan to repeat Pap+HPV cotesting in 3 years ()         PAST SURGICAL HISTORY:  Past Surgical History:   Procedure Laterality Date     BIOPSY LYMPH NODE AXILLA Right 2022    Procedure: Right axillary excisional lymph node dissection;  Surgeon: Ruslan Bahena DO;  Location: [facility] OR     SURGICAL HISTORY OF -       Tonsillectomy     SURGICAL HISTORY OF -   1986         SURGICAL HISTORY OF -       D & C     SURGICAL  HISTORY OF -   2005    carpal tunnel surgery   pt reports right knee surgery       SOCIAL HISTORY:  Social History     Socioeconomic History     Marital status:      Spouse name: Not on file     Number of children: Not on file     Years of education: Not on file     Highest education level: Not on file   Occupational History     Not on file   Tobacco Use     Smoking status: Never Smoker     Smokeless tobacco: Never Used   Substance and Sexual Activity     Alcohol use: Yes     Comment: weekends     Drug use: No     Sexual activity: Yes     Partners: Male   Other Topics Concern     Parent/sibling w/ CABG, MI or angioplasty before 65F 55M? Not Asked   Social History Narrative     Not on file     Social Determinants of Health     Financial Resource Strain: Not on file   Food Insecurity: Not on file   Transportation Needs: Not on file   Physical Activity: Not on file   Stress: Not on file   Social Connections: Not on file   Intimate Partner Violence: Not on file   Housing Stability: Not on file         FAMILY HISTORY:  Family History   Problem Relation Age of Onset     Hypertension Mother      Cancer Mother         BCC     Arthritis Mother         unspecified     Eye Disorder Mother         glaucoma, cataract     Cerebrovascular Disease Father      Cancer Father         lung  - smoked and in [employer]     Respiratory Father      Cancer Maternal Grandmother         Uterine     Alcohol/Drug Maternal Grandfather      Cancer Paternal Grandmother         Uterine     Cancer Half-Sister         Lung; smoked     Arthritis Half-Sister      Thyroid Disease Other      Depression Sister          PHYSICAL EXAM:  Vital signs:  LMP 09/15/2003    ECO  GENERAL/CONSTITUTIONAL: No acute distress.  EYES: Pupils are equal and round. Extraocular movements intact without nystagmus.  No scleral icterus.  ENT/MOUTH: Neck supple. Oropharynx clear, no mucositis.  LYMPH: No submandibular, submental, anterior or posterior cervical,  supraclavicular, axillary or inguinal adenopathy. + seroma right axilla- 3inch wide x 2inch tall, soft, mobile, nontender, pt reports unchanged since evaluated by surgery   RESPIRATORY: Equal chest rise. Clear to auscultation bilaterally. No rhonchi, crackles or wheezes.   CARDIOVASCULAR: Regular rate and rhythm without murmurs, gallops, or rubs.  GASTROINTESTINAL: Abdomen soft, non-tender, no distention. No hepatosplenomegaly or palpable masses. Normal bowel sounds. No rebound, guarding, rigidity.   MUSCULOSKELETAL: Warm and well-perfused, no cyanosis, clubbing, or edema.   NEUROLOGIC: Cranial nerves are grossly intact. Alert, oriented to person, place and time, answers questions appropriately.  INTEGUMENTARY: No rashes or jaundice.  GAIT: Steady, does not use assistive device      LABS:  CBC RESULTS:   Recent Labs   Lab Test 12/23/21  1104   WBC 6.3   RBC 4.45   HGB 13.8   HCT 41.8   MCV 94   MCH 31.0   MCHC 33.0   RDW 12.4          Recent Labs   Lab Test 12/23/21  1104 11/26/18  1001    139   POTASSIUM 4.3 3.7   CHLORIDE 106 105   CO2 29 26   ANIONGAP 3 8   * 99   BUN 10 8   CR 0.53 0.62   ABELINO 9.2 8.6         PATHOLOGY:    Case Report   Flow Cytometry Report                             Case: [id number]                                   Authorizing Provider:  Diana Fontaien APRN CNP  Collected:           07/28/2022 08:36 AM           Ordering Location:     [facility]          Received:            07/28/2022 08:39 AM                                  [facility]                                                             Pathologist:           Myesha Barclay MD                                                     Specimen:    Lymph Node(s), Axillary, Right                                                             Flow Interpretation   A. Lymph Node(s), Axillary, Right:  -Polytypic B cells  -No aberrant immunophenotype on T cells; increased CD4:CD8 ratio among T cells  -See  comment   Electronically signed by Myesha Barclay MD on 7/29/2022 at 10:16 AM   Comment    The finding of an increased CD4:CD8 ratio among T cells is non-specific and etiologies include neoplastic and non-neoplastic entities (for example, autoimmune disease).     There is no immunophenotypic evidence of non-Hodgkin lymphoma. Hodgkin lymphoma cannot be excluded by flow cytometry. T cell lymphomas cannot always be detected by this flow cytometry assay. Neoplastic cells, including large cells, may not survive specimen processing. This sample may not be representative. Final interpretation requires correlation with morphologic and clinical features.     Flow Phenotypic Data    91% of total events are CD45 positive and are viable by 7-AAD. A low percentage may be caused by low viability and/or a low number of CD45 positive cells. Of the CD45 positive leukocytes, 93% are viable by 7-AAD.       Unless otherwise indicated, percentages reported below are based on the total number of CD45 positive viable leukocytes. If applicable, percentage of plasma cells is from total viable nucleated cells.     30% polytypic B cells   61% T cells with a CD4:CD8 ratio of 27:1  0.6% NK cells   Flow Processing Information    Multi-color flow analysis is performed for the following markers: CD2, CD3, CD4, CD5, CD7, CD8, CD10, CD16, CD19, CD20, CD34, CD38, CD45, CD56, CD57, and kappa and lambda immunoglobulin light chains. Cells are gated to isolate populations (CD45 versus side scatter and forward scatter versus side scatter), to exclude debris (forward scatter versus side scatter) and to exclude cell doublets (forward scatter height versus forward scatter width and side scatter height versus side scatter width). Forward scatter varies with cell size. Side scatter varies with the amount of cytoplasmic granules. Intensity for CD45 usually increases as hematolymphoid cells mature.      Clinical Information    66 yr old female with  "persistent axillary lymphadenopathy          Case Report   Surgical Pathology Report                         Case: [id number]                                   Authorizing Provider:  Diana Fontaine APRN CNP  Collected:           07/28/2022 08:35 AM           Ordering Location:     [facility]          Received:            07/28/2022 08:39 AM                                  [facility]                                                             Pathologist:           Armando Rae MD                                                                            Specimen:    Lymph Node(s), Axillary, Right                                                             Final Diagnosis   Lymph node, right axillary, needle core biopsy:  - Partially sampled lymph node with no morphologic or immunophenotypic features of malignancy (including lymphoma), granulomas, or necrosis.  See comment.     COMMENT:  There are increased neutrophils in subcapsular and sinusoidal spaces, and an inflammatory or reactive etiology for the lymph node enlargement cannot be excluded.  Clinical correlation is recommended.   Electronically signed by Armando Rae MD on 7/29/2022 at  3:47 PM   Gross Description    A(A). Lymph Node(s), Axillary, Right, :  The specimen is received in formalin labeled with the patient's name, medical record number and other identifying information and designated \"lymph node, axillary, right\". It consists of 2 tan soft tissue cores measuring 1.5 cm and 1.7 cm in length and each less than 0.1 cm diameter.  Additionally received are 2 tan tissue core fragments, 0.1 cm and 0.2 cm in greatest dimension.  Wrapped and entirely sent in 1 cassette.   (DAVIN Moura (ASCP))      Microscopic Description    Sections demonstrate cores of lymph node with retained follicular architecture, appropriately polarized germinal " centers with tingible body macrophages, and patent sinuses.  There are increased neutrophils in subcapsular and sinusoidal spaces.  There are no granulomas, areas of necrosis, Hodgkin/Pascual-Marie cells, lymphocyte predominant (LP) cells, metastases, or other overt features of malignancy on H&E.     Stains for CD3, CD5, CD10, CD15, CD20, CD23, CD30, BCL2, BCL6, and Cyclin D1 are performed (on A1) with appropriate controls.  T-cells (positive for CD3 and CD5) and B-cells (positive for CD20) appear normal in proportion and distribution.  Germinal center lymphocytes are appropriately positive for CD10 and BCL6 and negative for BCL2.  CD23 is positive in follicular dendritic cells and highlights germinal centers/follicle architecture.  CD30 is positive in occasional immunoblasts.  There is no abnormal expression of CD5, CD10, CD23, or Cyclin D1 amongst B-cells.  The CD15 and CD30 stains do not support the presence of Hodgkin/Pascual-Marie cells.     Flow cytometry is performed and reported separately.  (For complete details, please see separate flow cytometry report, [id number]).  In summary, that study demonstrates polytypic B-cells and no aberrant immunophenotype on T-cells.   Performing Labs    The technical component of this testing was completed at [facility]           Case Report   Surgical Pathology Report                         Case: [id number]                                   Authorizing Provider:  Ruslan Bahena,   Collected:           09/12/2022 09:26 AM                                  DO                                                                            Ordering Location:     [facility]    Received:            09/12/2022 10:10 AM                                  [facility]                                                                       Pathologist:           Donna Harmon MD                                                            Specimen:    Lymph Node(s), Axillary, Right                                                             Addendum   Immunostains for  and kappa and lambda light chains are performed on specimen [id number], right axillary lymph, and shows patchy polyclonal plasma cell infiltrate in the lymph node interfollicular areas.  The immunostain controls react appropriately.  The original diagnoses remain unchanged.     Addendum electronically signed by Donna Harmon MD on 9/19/2022 at  5:55 PM   Final Diagnosis   Right axillary lymph nodes, two, excision:  - Mild follicular hyperplasia.  - Mild neutrophil infiltrate in sinusoids and subcapsular spaces.  - Negative for malignancy.       Electronically signed by Donna Harmon MD on 9/16/2022 at 12:07 PM   Comment    The morphologic and immunohistochemical findings are similar to the previous right axillary lymph node biopsy, [id number].  Flow cytometry shows polytypic B cells and no aberrant immunophenotype on T cells ([id number]).  The presence of intra-sinusoidal neutrophils raises the possibility of an inflammatory or reactive etiology.   Clinical Information    Procedure:  Right axillary excisional lymph node dissection - Right  Pre-op Diagnosis: Axillary adenopathy [R59.0]  Post-op Diagnosis: R59.0 - Axillary adenopathy [ICD-10-CM]     Ultrasound of right axilla 1/13/15 shows normal appearing lymph nodes.  Rheumatoid factor and PAT negative in 2015.  Mastitis of breast with abscess, side not provided, on 2/21/2020.  History of bilateral osteoarthritis of carpometacarpal joint (CMC) of both thumbs, status post steroid injection 1/4/21.     History of axillary lymphadenopathy for about 7 years.   On 12/31/21 bilateral mammograms with tomosynthesis were negative and multiple benign appearing lymph nodes were noted in both axillas.  Ultrasound right shows largest prominent lymph node measuring 2.9 x 1.7y x 1.0 cm and appears  "benign.  On 5/2/22 ultrasound of right axilla shows a 0.9 x 3.0 x 1.6 cm likely benign lymph node.    Ultrasound guided right axillary lymph node biopsy on 7/28/22, [id number], was diagnosed as no morphologic or immunophenotypic features of malignancy (including lymphoma), granulomas or necrosis and increased neutrophils in subcapsular and sinusoidal spaces and inflammatory or reactive etiology cannot be excluded.     Due to persistent right axillary lymphadenopathy with patient discomfort and anxiety, excision of two right axillary lymph nodes is performed on 9/12/22.   Gross Description    A(1). Lymph Node(s), Axillary, Right, :  The specimen is received in formalin and RPMI, labeled with the patient's name, medical record number and other identifying information designated \"right axillary lymph node biopsy\". It consists of 2 candidate lymph nodes, 2.1 and 1.5 cm.  The node from RPMI is used to create 2 touch preps and stained (Diff-Quik, H&E).  5 additional touch preps are performed and unstained.  A portion of the specimen is sent in RPMI solution for additional studies.  The remaining node is submitted for routine processing in A3.     The lymph node from the formalin container is serially sectioned and entirely submitted in A4-A7. (DAVIN Perez)      Microscopic Description    In both lymph nodes normal architecture is preserved.  There is mild follicular hyperplasia.  Sinuses are patent with mild acute inflammation intermixed with benign histocytes and lymphocytes.  There are scattered plasma cells. There is no evidence of Hodgkin or non-Hodgkin lymphoma, granulomas, necrosis or metastatic tumor.       Special Stains    Immunostains for CD3, CD5, CD10, CD15, CD20, CD23, CD30, BCL2, BCL6 and cyclin D1 are performed on block A6.  The immunostain controls react appropriately.  CD3 and CD5 T cells and CD20 B cells are in their normal distribution.  Germinal centers stain appropriately with CD10 and BCL6 " and are negative for NCL-2.  CD23 shows follicular dendritic cells and normal pattern in follicles.  CD30 stains occasional immunoblasts.  CD10 and CD15 stain the neutrophils in the sinusoids.   Flow Cytometry Summary    Flow Interpretation, [id number], 9/12/2022   A. Lymph Node(s), Axillary, Right:  -Polytypic B cells  -No aberrant immunophenotype on T cells      Flow Interpretation, [id number], 7/28/22:  A. Lymph Node(s), Axillary, Right:  -Polytypic B cells  -No aberrant immunophenotype on T cells; nonspecific increased CD4:CD8 ratio among T cells     For complete flow cytometry reports see [id number] and [id number].   Performing Labs    The technical component of this testing was completed at [facility]             Case Report   Flow Cytometry Report                             Case: [id number]                                   Authorizing Provider:  Som Chaudhry, Collected:           09/12/2022 03:41 PM                                  MD                                                                            Ordering Location:     [facility]    Received:            09/12/2022 03:41 PM                                  PreOP/Phase II                                                                Pathologist:           Myesha Barclay MD                                                     Specimen:    Lymph Node(s), Axillary, Right                                                             Flow Interpretation   A. Lymph Node(s), Axillary, Right:  -Polytypic B cells  -No aberrant immunophenotype on T cells  -See comment   Electronically signed by Myesha Barclay MD on 9/14/2022 at  6:20 PM   Comment    There is no immunophenotypic evidence of non-Hodgkin lymphoma. Hodgkin lymphoma cannot be excluded by flow cytometry. T cell lymphomas cannot always be detected by this flow cytometry assay. Neoplastic cells,  including large cells, may not survive specimen processing. This sample may not be representative. Final interpretation requires correlation with morphologic and clinical features.    Flow Phenotypic Data    71% of total events are CD45 positive and are viable by 7-AAD. A low percentage may be caused by low viability and/or a low number of CD45 positive cells. Of the CD45 positive leukocytes, 75% are viable by 7-AAD.       Unless otherwise indicated, percentages reported below are based on the total number of CD45 positive viable leukocytes. If applicable, percentage of plasma cells is from total viable nucleated cells     21% polytypic B cells     60% T cells with a CD4:CD8 ratio of 25:1     0.5% NK cells   Flow Processing Information    Multi-color flow analysis is performed for the following markers: CD2, CD3, CD4, CD5, CD7, CD8, CD10, CD16, CD19, CD20, CD34, CD38, CD45, CD56, CD57, and kappa and lambda immunoglobulin light chains. Cells are gated to isolate populations (CD45 versus side scatter and forward scatter versus side scatter), to exclude debris (forward scatter versus side scatter) and to exclude cell doublets (forward scatter height versus forward scatter width and side scatter height versus side scatter width). Forward scatter varies with cell size. Side scatter varies with the amount of cytoplasmic granules. Intensity for CD45 usually increases as hematolymphoid cells mature.   Clinical Information    66 year old female with lymphadenopathy         IMAGING:  MA DIAGNOSTIC BILATERAL W/ FABY, US AXILLARY RIGHT 5/18/2020 12:49 PM     HISTORY:  Palpable lump in right axilla.     COMPARISON:  12/12/2018.     TECHNIQUE:  Bilateral digital mammography with CAD is performed as  well as DBT. Directed right axillary ultrasound is also done.     BREAST DENSITY: Scattered fibroglandular densities.     BILATERAL MAMMOGRAM: No suspicious findings malignancy.     RIGHT AXILLARY ULTRASOUND: Directed sonography to  the right axilla is  remarkable for some benign-appearing lymph nodes wit large fatty shayna.  The largest one measures 1 cm in short axis dimension.                                                                      IMPRESSION: BI-RADS CATEGORY: 2 - Benign Finding(s).     RECOMMENDED FOLLOW-UP: Annual Mammography.    MAMMOGRAM DIAGNOSTIC BILATERAL WITH TOMOSYNTHESIS, DIGITAL w/CAD;    TARGETED ULTRASOUND, RIGHT AXILLA - 12/31/2021 9:10 AM     HISTORY: History of mildly prominent lymph nodes in the right axilla.  States that this palpable lymph node right axilla may have slightly  increased in size. History of second COVID vaccination injection into  left arm and booster injection into left arm around the time of  Thanksgiving.     COMPARISON:  Mammograms dated 5/18/2020, 12/12/2018, 1/7/2015 and  1/7/2013. Right breast/axillary ultrasound dated 5/18/2020.     BREAST DENSITY: Heterogeneously dense.  Limiting mammographic sensitivity..     FINDINGS: Multiple benign-appearing lymph nodes are seen in the  bilateral axillary regions. One in the left axilla appears to be  slightly more prominent than those on the right. Patient describes no  symptoms in the left axilla. Palpable finding is in the left axilla  and appears to be associated with one of the benign-appearing lymph  nodes on mammogram. No other mammographic findings of concern for  malignancy.     Ultrasound of the right axilla at the site of symptoms demonstrates a  mildly prominent lymph node measuring 2.9 x 1.7 x 1.0 cm, with a fatty  hilum, and not appreciably changed in size since the prior study dated  5/18/2020. This is upper normal size but does not appear appreciably  changed.                                                                      IMPRESSION: BI-RADS CATEGORY: 3 - Probably Benign Finding-Short  Interval Follow-Up Suggested. Upper normal size lymph node right  axilla corresponding with palpable finding. Recommend close  clinical  follow-up. Six-month follow-up ultrasound is also recommended. Should  this increase in size or change in character then further evaluation  with biopsy (surgical excision) may be indicated.       RECOMMENDED FOLLOW-UP: Short Interval Follow-up 6 Months, ultrasound  of the right axilla in the area of the palpable finding. Clinical  follow-up is also recommended.     I discussed the findings and recommendations with the patient at the  time of the exam.      ALAN GOMES MD     ULTRASOUND AXILLARY RIGHT July 18, 2022 1:40 PM     CLINICAL HISTORY: Six month follow up right breast ultrasound 7-1-22.  BI-RADS category 3 mammogram result. Axillary adenopathy.     TECHNIQUE: Routine.     COMPARISON: Right axillary ultrasound dated 12/31/2021, 5/18/2020 and  1/14/2015.     FINDINGS: Prominent lymph node corresponds with palpable finding  measures 0.9 x 3.0 x 1.6 cm with a cortex measuring up to 0.6 cm in  thickness. This likely represents a normal reactive lymph node  although it has not decreased in prominence since the prior study  dated 12/31/2021. Cortex of this lymph node was very thin on the prior  study dated 1/14/2015. Further evaluation with tissue sampling is  recommended.                                                                      IMPRESSION: Right axillary lymph node does not appear significantly  changed in size since the most recent prior ultrasound although it has  not decreased in prominence. This likely represents an inflammatory  reactive lymph node, however, it has not decreased in prominence since  the most recent prior study. Neoplastic infiltration, though thought  less likely, is not entirely excluded and further evaluation with  tissue sampling is recommended.     I discussed these findings and recommendations with the patient at the  time of the exam.     ALAN GOMES MD     ULTRASOUND-GUIDED RIGHT AXILLARY LYMPH NODE CORE BIOPSY  7/28/2022  8:44 AM      HISTORY: Right axillary  adenopathy which has not resolved. Biopsy was  recommended to ensure no underlying malignancy.     COMPARISON: Mammograms dated 12/31/2021 and 5/18/2020, ultrasound  right axilla dated 12/31/2021 and 7/18/2022.     PROCEDURE: Following discussion of risks and benefits, consent was  obtained from the patient. An appropriate skin puncture site was  selected using sonographic guidance. The skin was then prepped and  draped in usual sterile fashion. 5 mL of 1% lidocaine without  epinephrine was infiltrated for local anesthetic and a 17-gauge trocar  was introduced and its tip was placed adjacent to the most prominent  lymph node in the right axilla via a lateral approach. A series of 6  samples were obtained with and 18-gauge core-cutting needle. Three of  the samples were placed in formalin and the other 3 samples were  placed in RPMI solution. All samples were sent to pathology for  appropriate evaluation.     She tolerated the procedure without difficulty and there was no  immediate complication.      PHYSICIAN:  Dr. Alan Gomes.  ASSISTANTS: Ultrasound technologists.  PROCEDURE: Right axillary biopsy.  FINDINGS:  Biopsy needle is seen in the cortex of the prominent lymph  node on all biopsy attempts.  ESTIMATED BLOOD LOSS:  Less than 5 mL.  SPECIMENS REMOVED: 6 x 18 gauge core biopsy samples.  POSTOP DIAGNOSIS: Successful right axillary biopsy.                                                                      IMPRESSION:   1. Technically successful right axillary biopsy. No immediate  complications. Final diagnosis is awaiting pathologic evaluation.  2. Post procedure mammograms for marker placement.     ALAN GOMES MD     ASSESSMENT/PLAN:  Levar Sesay is a 67 year old female who was referred to the Hematology/Oncology Clinic for right axillary lymphadenopathy.    #right axillary SANDY  - present since at least 2015, I personally reviewed the radiology reports for all of the US and mammograms indicated  "above since then  - s/p core biopsy 7/22 and excisional biopsy 9/22  - reviewed both surgical pathology and flow cytometry results from both with patient in great detail, all questions answered  - pathology and flow cytometry reviewed as above, no evidence of malignancy, architecture WNL, stains of immune cells WNL, normal polytypic B cells, T cells w/no aberrant immunophenotype.   -Flow cytometry notes \"Hodgkin lymphoma cannot be excluded by flow cytometry. T cell lymphomas cannot always be detected by this flow cytometry assay. Neoplastic cells, including large cells, may not survive specimen processing. This sample may not be representative. Final interpretation requires correlation with morphologic and clinical features. \" which is a general disclaimer, these have been ruled out on the actual surgical pathologic analysis as noted above and pt does not have any clinical features suggestive of these   - above results do suggest reactive/inflammatory process  - 5/2020 Hepatitis C antibody and HIV antigen antibody combo negative, pt does not wish for repeat HIV antigen antibody screen  - 12/2021 TSH 2.54  - check CBC, CMP, LDH, hepatitis B and C, PAT, RF, anti-CCP Abs, ESR, CRP  - recommend age appropriate cancer screening including mammogram due in 12/2022 (ordered),  pap smear (results from 11/2018 show ASCUS w/transformation zone present) with gyn, colonoscopy to be coordinated with PCP   - last MRI 12/2005 states \"given that there can be some overlap between the appearance of benign endochondroma and low-grade chondrosarcoma, follow-up imaging may be advised to ensure the stability of this lesion, particularly if there is pain associated with the distal femur closed \", pt denies pain, B sx, in agreement to hold further w/u regarding this as she is asx  - Pt is going to [place] and will return 11/17/2022    RTC 11/29 or 11/30 with me for f/u for the above labs which were drawn today    Zulay Alfredo, " DO  Hematology/Oncology  [facility]    Future Appointments   Date Time Provider Department Center   10/5/2022 11:00 AM Zulay Fuentes,  [facility]

## 2022-10-05 ENCOUNTER — ONCOLOGY VISIT (OUTPATIENT)
Dept: ONCOLOGY | Facility: CLINIC | Age: 67
End: 2022-10-05
Attending: SURGERY
Payer: COMMERCIAL

## 2022-10-05 VITALS
HEIGHT: 63 IN | SYSTOLIC BLOOD PRESSURE: 130 MMHG | DIASTOLIC BLOOD PRESSURE: 88 MMHG | TEMPERATURE: 97.9 F | HEART RATE: 96 BPM | RESPIRATION RATE: 18 BRPM | BODY MASS INDEX: 26.08 KG/M2 | WEIGHT: 147.19 LBS | OXYGEN SATURATION: 99 %

## 2022-10-05 DIAGNOSIS — Z11.59 ENCOUNTER FOR SCREENING FOR OTHER VIRAL DISEASES: ICD-10-CM

## 2022-10-05 DIAGNOSIS — Z12.31 ENCOUNTER FOR SCREENING MAMMOGRAM FOR BREAST CANCER: ICD-10-CM

## 2022-10-05 DIAGNOSIS — R59.0 LYMPHADENOPATHY, AXILLARY: Primary | ICD-10-CM

## 2022-10-05 LAB
ALBUMIN SERPL-MCNC: 3.9 G/DL (ref 3.4–5)
ALP SERPL-CCNC: 75 U/L (ref 40–150)
ALT SERPL W P-5'-P-CCNC: 20 U/L (ref 0–50)
ANION GAP SERPL CALCULATED.3IONS-SCNC: 3 MMOL/L (ref 3–14)
AST SERPL W P-5'-P-CCNC: 22 U/L (ref 0–45)
BASOPHILS # BLD AUTO: 0 10E3/UL (ref 0–0.2)
BASOPHILS NFR BLD AUTO: 0 %
BILIRUB SERPL-MCNC: 0.6 MG/DL (ref 0.2–1.3)
BUN SERPL-MCNC: 9 MG/DL (ref 7–30)
CALCIUM SERPL-MCNC: 10 MG/DL (ref 8.5–10.1)
CHLORIDE BLD-SCNC: 107 MMOL/L (ref 94–109)
CO2 SERPL-SCNC: 30 MMOL/L (ref 20–32)
CREAT SERPL-MCNC: 0.5 MG/DL (ref 0.52–1.04)
CRP SERPL-MCNC: <2.9 MG/L (ref 0–8)
EOSINOPHIL # BLD AUTO: 0.1 10E3/UL (ref 0–0.7)
EOSINOPHIL NFR BLD AUTO: 1 %
ERYTHROCYTE [DISTWIDTH] IN BLOOD BY AUTOMATED COUNT: 11.5 % (ref 10–15)
ERYTHROCYTE [SEDIMENTATION RATE] IN BLOOD BY WESTERGREN METHOD: 10 MM/HR (ref 0–30)
GFR SERPL CREATININE-BSD FRML MDRD: >90 ML/MIN/1.73M2
GLUCOSE BLD-MCNC: 97 MG/DL (ref 70–99)
HCT VFR BLD AUTO: 44.7 % (ref 35–47)
HGB BLD-MCNC: 14.8 G/DL (ref 11.7–15.7)
IMM GRANULOCYTES # BLD: 0 10E3/UL
IMM GRANULOCYTES NFR BLD: 0 %
LDH SERPL L TO P-CCNC: 180 U/L (ref 81–234)
LYMPHOCYTES # BLD AUTO: 2 10E3/UL (ref 0.8–5.3)
LYMPHOCYTES NFR BLD AUTO: 28 %
MCH RBC QN AUTO: 31.2 PG (ref 26.5–33)
MCHC RBC AUTO-ENTMCNC: 33.1 G/DL (ref 31.5–36.5)
MCV RBC AUTO: 94 FL (ref 78–100)
MONOCYTES # BLD AUTO: 0.5 10E3/UL (ref 0–1.3)
MONOCYTES NFR BLD AUTO: 6 %
NEUTROPHILS # BLD AUTO: 4.7 10E3/UL (ref 1.6–8.3)
NEUTROPHILS NFR BLD AUTO: 65 %
NRBC # BLD AUTO: 0 10E3/UL
NRBC BLD AUTO-RTO: 0 /100
PLATELET # BLD AUTO: 272 10E3/UL (ref 150–450)
POTASSIUM BLD-SCNC: 4.1 MMOL/L (ref 3.4–5.3)
PROT SERPL-MCNC: 8.1 G/DL (ref 6.8–8.8)
RBC # BLD AUTO: 4.75 10E6/UL (ref 3.8–5.2)
SODIUM SERPL-SCNC: 140 MMOL/L (ref 133–144)
WBC # BLD AUTO: 7.2 10E3/UL (ref 4–11)

## 2022-10-05 PROCEDURE — 86140 C-REACTIVE PROTEIN: CPT | Performed by: INTERNAL MEDICINE

## 2022-10-05 PROCEDURE — 83615 LACTATE (LD) (LDH) ENZYME: CPT | Performed by: INTERNAL MEDICINE

## 2022-10-05 PROCEDURE — 86704 HEP B CORE ANTIBODY TOTAL: CPT | Performed by: INTERNAL MEDICINE

## 2022-10-05 PROCEDURE — 86706 HEP B SURFACE ANTIBODY: CPT | Performed by: INTERNAL MEDICINE

## 2022-10-05 PROCEDURE — 85025 COMPLETE CBC W/AUTO DIFF WBC: CPT | Performed by: INTERNAL MEDICINE

## 2022-10-05 PROCEDURE — 36415 COLL VENOUS BLD VENIPUNCTURE: CPT | Performed by: INTERNAL MEDICINE

## 2022-10-05 PROCEDURE — 87340 HEPATITIS B SURFACE AG IA: CPT | Performed by: INTERNAL MEDICINE

## 2022-10-05 PROCEDURE — 87522 HEPATITIS C REVRS TRNSCRPJ: CPT | Performed by: INTERNAL MEDICINE

## 2022-10-05 PROCEDURE — 86803 HEPATITIS C AB TEST: CPT | Performed by: INTERNAL MEDICINE

## 2022-10-05 PROCEDURE — 99204 OFFICE O/P NEW MOD 45 MIN: CPT | Performed by: INTERNAL MEDICINE

## 2022-10-05 PROCEDURE — 85652 RBC SED RATE AUTOMATED: CPT | Performed by: INTERNAL MEDICINE

## 2022-10-05 PROCEDURE — 86038 ANTINUCLEAR ANTIBODIES: CPT | Performed by: INTERNAL MEDICINE

## 2022-10-05 PROCEDURE — 86431 RHEUMATOID FACTOR QUANT: CPT | Performed by: INTERNAL MEDICINE

## 2022-10-05 PROCEDURE — 86200 CCP ANTIBODY: CPT | Performed by: INTERNAL MEDICINE

## 2022-10-05 PROCEDURE — 80053 COMPREHEN METABOLIC PANEL: CPT | Performed by: INTERNAL MEDICINE

## 2022-10-05 ASSESSMENT — PAIN SCALES - GENERAL: PAINLEVEL: NO PAIN (0)

## 2022-10-05 NOTE — NURSING NOTE
DISCHARGE PLAN:  Next appointments: See patient instruction section  Departure Mode: Ambulatory  Accompanied by: self  5 minutes for nursing discharge (face to face time)     Levar Sesay is here today for oncology follow up.  Writing nurse seen patient after Medical Oncology appointment to address questions/concerns/coordinate care.   Appointments scheduled for mammogram in January and follow up appt with Dr. Fuentes this November. Pt given AVS with calendars of appts.  See patient instructions and Oncologist's Progress note for further details. Questions and concerns addressed to patient's satisfaction. Patient verbalized and demonstrated understanding of plan.  Contact information provided and patient is encouraged to call with any that arise in the interim of care.    Tamra Perry  [facility]  [phone]  10/5/2022, 12:05 PM

## 2022-10-05 NOTE — PATIENT INSTRUCTIONS
Today:  Please follow up as scheduled below:    Mammogram in [place] on 01/03/23  @ 7:45 am in [place]    Please follow up with Dr. Fuentes.      Office visit follow up with Dr. Fuentes on Date/Time: 11/29/22 @ 9:00 am in [place]    If you have any questions or concerns please feel free to call.    If you need to reschedule please call:  Clinic or Lab Appointment - [phone]  Infusion - [phone]  Imaging - [phone]    Tamra Perry [facility]   Oncology/Hematology  [facility]  [phone]    After Hours Oncology Nurse Line - [phone]

## 2022-10-05 NOTE — LETTER
10/5/2022         RE: Levar Sesay  [id number] [facility] [address]        Dear Colleague,    Thank you for referring your patient, Levar Sesay, to the [facility]. Please see a copy of my visit note below.    [facility]    Hematology/Oncology New Patient Note      Today's Date: 10/5/2022    Reason for Consultation: right axillary lymphadenopathy  Referring Provider: Rulsan Bahena DO      HISTORY OF PRESENT ILLNESS: Levar Sesay is a 67 year old female who was referred to the Hematology/Oncology Clinic for right axillary lymphadenopathy.    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    -2012- pt first noted enlarged right axillary lymph node, did not pursue medical evaluation 2/2 insurance issues    -1/2015- right axillary ultrasound- normal-appearing lymph nodes are seen in right axilla, largest node measures about 2.5 cm however the node is of normal morphology with normal fatty shayna.  No suspicious masses or other abnormalities are identified.    -5/2020- bilateral mammogram with right axillary ultrasound-benign appearing lymph node with large fatty shayna, largest 1 measures 1 cm in short axis dimension    - 12/2021- bilateral mammogram with right axillary ultrasound- multiple benign-appearing lymph nodes are seen in the bilateral axillary regions.  1 in the left axilla more prominent than those on the right.  Ultrasound of the right axilla at site of symptoms (patient reports enlarging lymph node), demonstrates mildly prominent lymph node measuring 2.9 x 1.7 x 1.0 cm with a fatty hilum and not appreciably changed in size since the prior study 5/2020.    - 7/2022-right axillary ultrasound- prominent lymph node corresponds  with palpable finding measures 0.9 x 3.0 x 1.6 cm with a cortex measuring 0.6 cm in thickness, likely represents normal reactive lymph node although it is not decreased in prominence since prior study 12/2021.    - 7/2022-ultrasound-guided right axillary lymph node core biopsy- 6 samples obtained with 18-gauge core cutting needle, pathology and flow cytometry full details below and summarized as:  PATHOLOGY  ---no malignancy, granulomas, necrosis  ---increased neutrophils in subcapsular and sinusoidal spaces  ---retained architecture, appropriate stains of T-cells, B-cells, germinal center lymphocytes, follicular dendritic cells   -- CD15 and CD30 stains do not support the presence of Hodgkin/Pascual-Marie cells.  FLOW CYTOMETRY  -Polytypic B cells  -No aberrant immunophenotype on T cells; increased CD4:CD8 ratio among T cells  -no immunophenotypic evidence of non-Hodgkin lymphoma    -9/2022- right axillary lymph node excision of 2 lymph nodes, pathology and flow cytometry full details below and summarized as:  PATHOLOGY  --mild follicular hyperplasia  --mild neutrophil infiltrate in subcapsular and sinusoidal spaces, raises the possibility of an inflammatory or reactive etiology.  --negative for malignancy  --morphologic and IHC similar to previous (both lymph nodes normal architecture is preserved.  There is mild follicular hyperplasia. Sinuses are patent with mild acute inflammation intermixed with benign histocytes and lymphocytes. There are scattered plasma cells. There is no evidence of Hodgkin or non-Hodgkin lymphoma, granulomas, necrosis or metastatic tumor. Immunostains appropriate)   FLOW CYTOMETRY  -Polytypic B cells  -No aberrant immunophenotype on T cells    Pt denies weight loss, lymphadenopathy, night sweats. Cancer screening hx includes mammogram in 12/2021 (Heterogeneously dense, limiting mammographic sensitivity), pap smear (results from 11/2018 show ASCUS w/transformation zone present),  colonoscopy in [facility] 1/2015 (normal looking colon). Arthritis in b/l hands particularly thumbs. Pt denies significant right knee or leg pain, at site of endochondroma- pt reports she had some f/u with XRays after right knee surgery but was told she didn't need further f/u. Pt reports some tobacco use in high school but not otherwise. 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     Family history-  Father- lung cancer diagnosed around age 75 s/p chemotherapy and subsequently passed 4-5 years after diagnosis, +heavy smoker  Half sister- lung cancer diagnosis around mid-50s s/p chemotherapy, +smoker   Mother- passed at age 90 2/2 PE, skin cancer ?related to radiation tx in younger years for acne  5 other sisters- no hx of cancer  Maternal GM- uterine CA  Paternal GM- uterine CA  No hx of lymphoma, leukemias      REVIEW OF SYSTEMS:   A 14 point ROS was reviewed with pertinent positives and negatives in the HPI.        HOME MEDICATIONS:  Current Outpatient Medications   Medication Sig Dispense Refill     calcium carbonate 750 MG CHEW Take 750 mg by mouth daily       docusate sodium (COLACE) 100 MG capsule Take 1 capsule (100 mg) by mouth 2 times daily Take while on opiate to prevent constipation  0     MULTIPLE VITAMIN OR None Entered (Patient not taking: No sig reported)       triamcinolone (KENALOG) 0.1 % external ointment Apply topically 2 times daily 30 g 1         ALLERGIES:  Allergies   Allergen Reactions     Sulfa Drugs      Sulfa         PAST MEDICAL HISTORY:  Past Medical History:   Diagnosis Date     Abnormal glandular Papanicolaou smear of cervix 02/2003    ASCUS; negative HPV     ASCUS of cervix with negative high risk HPV 11/2018    plan to repeat Pap+HPV cotesting in 3 years (2021)         PAST SURGICAL HISTORY:  Past Surgical History:   Procedure Laterality Date     BIOPSY LYMPH NODE AXILLA Right 9/12/2022     Procedure: Right axillary excisional lymph node dissection;  Surgeon: Ruslan Bahena DO;  Location: [facility] OR     SURGICAL HISTORY OF -       Tonsillectomy     SURGICAL HISTORY OF -   1986         SURGICAL HISTORY OF -       D & C     SURGICAL HISTORY OF -   2005    carpal tunnel surgery   pt reports right knee surgery       SOCIAL HISTORY:  Social History     Socioeconomic History     Marital status:      Spouse name: Not on file     Number of children: Not on file     Years of education: Not on file     Highest education level: Not on file   Occupational History     Not on file   Tobacco Use     Smoking status: Never Smoker     Smokeless tobacco: Never Used   Substance and Sexual Activity     Alcohol use: Yes     Comment: weekends     Drug use: No     Sexual activity: Yes     Partners: Male   Other Topics Concern     Parent/sibling w/ CABG, MI or angioplasty before 65F 55M? Not Asked   Social History Narrative     Not on file     Social Determinants of Health     Financial Resource Strain: Not on file   Food Insecurity: Not on file   Transportation Needs: Not on file   Physical Activity: Not on file   Stress: Not on file   Social Connections: Not on file   Intimate Partner Violence: Not on file   Housing Stability: Not on file         FAMILY HISTORY:  Family History   Problem Relation Age of Onset     Hypertension Mother      Cancer Mother         BCC     Arthritis Mother         unspecified     Eye Disorder Mother         glaucoma, cataract     Cerebrovascular Disease Father      Cancer Father         lung  - smoked and in [employer]     Respiratory Father      Cancer Maternal Grandmother         Uterine     Alcohol/Drug Maternal Grandfather      Cancer Paternal Grandmother         Uterine     Cancer Half-Sister         Lung; smoked     Arthritis Half-Sister      Thyroid Disease Other      Depression Sister          PHYSICAL EXAM:  Vital signs:  LMP 09/15/2003    ECOG:  0  GENERAL/CONSTITUTIONAL: No acute distress.  EYES: Pupils are equal and round. Extraocular movements intact without nystagmus.  No scleral icterus.  ENT/MOUTH: Neck supple. Oropharynx clear, no mucositis.  LYMPH: No submandibular, submental, anterior or posterior cervical, supraclavicular, axillary or inguinal adenopathy. + seroma right axilla- 3inch wide x 2inch tall, soft, mobile, nontender, pt reports unchanged since evaluated by surgery   RESPIRATORY: Equal chest rise. Clear to auscultation bilaterally. No rhonchi, crackles or wheezes.   CARDIOVASCULAR: Regular rate and rhythm without murmurs, gallops, or rubs.  GASTROINTESTINAL: Abdomen soft, non-tender, no distention. No hepatosplenomegaly or palpable masses. Normal bowel sounds. No rebound, guarding, rigidity.   MUSCULOSKELETAL: Warm and well-perfused, no cyanosis, clubbing, or edema.   NEUROLOGIC: Cranial nerves are grossly intact. Alert, oriented to person, place and time, answers questions appropriately.  INTEGUMENTARY: No rashes or jaundice.  GAIT: Steady, does not use assistive device      LABS:  CBC RESULTS:   Recent Labs   Lab Test 12/23/21  1104   WBC 6.3   RBC 4.45   HGB 13.8   HCT 41.8   MCV 94   MCH 31.0   MCHC 33.0   RDW 12.4          Recent Labs   Lab Test 12/23/21  1104 11/26/18  1001    139   POTASSIUM 4.3 3.7   CHLORIDE 106 105   CO2 29 26   ANIONGAP 3 8   * 99   BUN 10 8   CR 0.53 0.62   ABELINO 9.2 8.6         PATHOLOGY:    Case Report   Flow Cytometry Report                             Case: [id number]                                   Authorizing Provider:  Diana Fontaine APRN CNP  Collected:           07/28/2022 08:36 AM           Ordering Location:     [facility]          Received:            07/28/2022 08:39 AM                                  [facility]                                                             Pathologist:           Myesha Barclay MD                                                      Specimen:    Lymph Node(s), Axillary, Right                                                             Flow Interpretation   A. Lymph Node(s), Axillary, Right:  -Polytypic B cells  -No aberrant immunophenotype on T cells; increased CD4:CD8 ratio among T cells  -See comment   Electronically signed by Myesha Barclay MD on 7/29/2022 at 10:16 AM   Comment    The finding of an increased CD4:CD8 ratio among T cells is non-specific and etiologies include neoplastic and non-neoplastic entities (for example, autoimmune disease).     There is no immunophenotypic evidence of non-Hodgkin lymphoma. Hodgkin lymphoma cannot be excluded by flow cytometry. T cell lymphomas cannot always be detected by this flow cytometry assay. Neoplastic cells, including large cells, may not survive specimen processing. This sample may not be representative. Final interpretation requires correlation with morphologic and clinical features.     Flow Phenotypic Data    91% of total events are CD45 positive and are viable by 7-AAD. A low percentage may be caused by low viability and/or a low number of CD45 positive cells. Of the CD45 positive leukocytes, 93% are viable by 7-AAD.       Unless otherwise indicated, percentages reported below are based on the total number of CD45 positive viable leukocytes. If applicable, percentage of plasma cells is from total viable nucleated cells.     30% polytypic B cells   61% T cells with a CD4:CD8 ratio of 27:1  0.6% NK cells   Flow Processing Information    Multi-color flow analysis is performed for the following markers: CD2, CD3, CD4, CD5, CD7, CD8, CD10, CD16, CD19, CD20, CD34, CD38, CD45, CD56, CD57, and kappa and lambda immunoglobulin light chains. Cells are gated to isolate populations (CD45 versus side scatter and forward scatter versus side scatter), to exclude debris (forward scatter versus side scatter) and to exclude cell doublets (forward scatter height versus forward scatter width  "and side scatter height versus side scatter width). Forward scatter varies with cell size. Side scatter varies with the amount of cytoplasmic granules. Intensity for CD45 usually increases as hematolymphoid cells mature.      Clinical Information    66 yr old female with persistent axillary lymphadenopathy          Case Report   Surgical Pathology Report                         Case: [id number]                                   Authorizing Provider:  Diana Fontaine APRN CNP  Collected:           07/28/2022 08:35 AM           Ordering Location:     [facility]          Received:            07/28/2022 08:39 AM                                  [facility]                                                             Pathologist:           Armando Rae MD                                                                            Specimen:    Lymph Node(s), Axillary, Right                                                             Final Diagnosis   Lymph node, right axillary, needle core biopsy:  - Partially sampled lymph node with no morphologic or immunophenotypic features of malignancy (including lymphoma), granulomas, or necrosis.  See comment.     COMMENT:  There are increased neutrophils in subcapsular and sinusoidal spaces, and an inflammatory or reactive etiology for the lymph node enlargement cannot be excluded.  Clinical correlation is recommended.   Electronically signed by Armando Rae MD on 7/29/2022 at  3:47 PM   Gross Description    A(A). Lymph Node(s), Axillary, Right, :  The specimen is received in formalin labeled with the patient's name, medical record number and other identifying information and designated \"lymph node, axillary, right\". It consists of 2 tan soft tissue cores measuring 1.5 cm and 1.7 cm in length and each less than 0.1 cm diameter.  Additionally received are 2 tan tissue " core fragments, 0.1 cm and 0.2 cm in greatest dimension.  Wrapped and entirely sent in 1 cassette.   (DAVIN Moura ([facility]))      Microscopic Description    Sections demonstrate cores of lymph node with retained follicular architecture, appropriately polarized germinal centers with tingible body macrophages, and patent sinuses.  There are increased neutrophils in subcapsular and sinusoidal spaces.  There are no granulomas, areas of necrosis, Hodgkin/Pascual-Marie cells, lymphocyte predominant (LP) cells, metastases, or other overt features of malignancy on H&E.     Stains for CD3, CD5, CD10, CD15, CD20, CD23, CD30, BCL2, BCL6, and Cyclin D1 are performed (on A1) with appropriate controls.  T-cells (positive for CD3 and CD5) and B-cells (positive for CD20) appear normal in proportion and distribution.  Germinal center lymphocytes are appropriately positive for CD10 and BCL6 and negative for BCL2.  CD23 is positive in follicular dendritic cells and highlights germinal centers/follicle architecture.  CD30 is positive in occasional immunoblasts.  There is no abnormal expression of CD5, CD10, CD23, or Cyclin D1 amongst B-cells.  The CD15 and CD30 stains do not support the presence of Hodgkin/Pascual-Marie cells.     Flow cytometry is performed and reported separately.  (For complete details, please see separate flow cytometry report, [id number]).  In summary, that study demonstrates polytypic B-cells and no aberrant immunophenotype on T-cells.   Performing Labs    The technical component of this testing was completed at [facility]           Case Report   Surgical Pathology Report                         Case: [id number]                                   Authorizing Provider:  Ruslan Bahena,   Collected:           09/12/2022 09:26 AM                                  DO                                                                             Ordering Location:     [facility]    Received:            09/12/2022 10:10 AM                                  [facility] OR                                                                       Pathologist:           Donna Harmon MD                                                           Specimen:    Lymph Node(s), Axillary, Right                                                             Addendum   Immunostains for  and kappa and lambda light chains are performed on specimen [id number], right axillary lymph, and shows patchy polyclonal plasma cell infiltrate in the lymph node interfollicular areas.  The immunostain controls react appropriately.  The original diagnoses remain unchanged.     Addendum electronically signed by Donna Harmon MD on 9/19/2022 at  5:55 PM   Final Diagnosis   Right axillary lymph nodes, two, excision:  - Mild follicular hyperplasia.  - Mild neutrophil infiltrate in sinusoids and subcapsular spaces.  - Negative for malignancy.       Electronically signed by Donna Harmon MD on 9/16/2022 at 12:07 PM   Comment    The morphologic and immunohistochemical findings are similar to the previous right axillary lymph node biopsy, [id number].  Flow cytometry shows polytypic B cells and no aberrant immunophenotype on T cells ([id number]).  The presence of intra-sinusoidal neutrophils raises the possibility of an inflammatory or reactive etiology.   Clinical Information    Procedure:  Right axillary excisional lymph node dissection - Right  Pre-op Diagnosis: Axillary adenopathy [R59.0]  Post-op Diagnosis: R59.0 - Axillary adenopathy [ICD-10-CM]     Ultrasound of right axilla 1/13/15 shows normal appearing lymph nodes.  Rheumatoid factor and PAT negative in 2015.  Mastitis of breast with abscess, side not provided, on 2/21/2020.  History of bilateral osteoarthritis of carpometacarpal joint (CMC) of both thumbs, status post steroid injection 1/4/21.     History of  "axillary lymphadenopathy for about 7 years.   On 12/31/21 bilateral mammograms with tomosynthesis were negative and multiple benign appearing lymph nodes were noted in both axillas.  Ultrasound right shows largest prominent lymph node measuring 2.9 x 1.7y x 1.0 cm and appears benign.  On 5/2/22 ultrasound of right axilla shows a 0.9 x 3.0 x 1.6 cm likely benign lymph node.    Ultrasound guided right axillary lymph node biopsy on 7/28/22, [id number], was diagnosed as no morphologic or immunophenotypic features of malignancy (including lymphoma), granulomas or necrosis and increased neutrophils in subcapsular and sinusoidal spaces and inflammatory or reactive etiology cannot be excluded.     Due to persistent right axillary lymphadenopathy with patient discomfort and anxiety, excision of two right axillary lymph nodes is performed on 9/12/22.   Gross Description    A(1). Lymph Node(s), Axillary, Right, :  The specimen is received in formalin and RPMI, labeled with the patient's name, medical record number and other identifying information designated \"right axillary lymph node biopsy\". It consists of 2 candidate lymph nodes, 2.1 and 1.5 cm.  The node from RPMI is used to create 2 touch preps and stained (Diff-Quik, H&E).  5 additional touch preps are performed and unstained.  A portion of the specimen is sent in RPMI solution for additional studies.  The remaining node is submitted for routine processing in A3.     The lymph node from the formalin container is serially sectioned and entirely submitted in A4-A7. (DAVIN Perez)      Microscopic Description    In both lymph nodes normal architecture is preserved.  There is mild follicular hyperplasia.  Sinuses are patent with mild acute inflammation intermixed with benign histocytes and lymphocytes.  There are scattered plasma cells. There is no evidence of Hodgkin or non-Hodgkin lymphoma, granulomas, necrosis or metastatic tumor.       Special Stains  "   Immunostains for CD3, CD5, CD10, CD15, CD20, CD23, CD30, BCL2, BCL6 and cyclin D1 are performed on block A6.  The immunostain controls react appropriately.  CD3 and CD5 T cells and CD20 B cells are in their normal distribution.  Germinal centers stain appropriately with CD10 and BCL6 and are negative for NCL-2.  CD23 shows follicular dendritic cells and normal pattern in follicles.  CD30 stains occasional immunoblasts.  CD10 and CD15 stain the neutrophils in the sinusoids.   Flow Cytometry Summary    Flow Interpretation, [id number], 9/12/2022   A. Lymph Node(s), Axillary, Right:  -Polytypic B cells  -No aberrant immunophenotype on T cells      Flow Interpretation, [id number], 7/28/22:  A. Lymph Node(s), Axillary, Right:  -Polytypic B cells  -No aberrant immunophenotype on T cells; nonspecific increased CD4:CD8 ratio among T cells     For complete flow cytometry reports see [id number] and [id number].   Performing Labs    The technical component of this testing was completed at [facility]             Case Report   Flow Cytometry Report                             Case: [id number]                                   Authorizing Provider:  Som Chaudhry, Collected:           09/12/2022 03:41 PM                                  MD                                                                            Ordering Location:     [facility]    Received:            09/12/2022 03:41 PM                                  PreOP/Phase II                                                                Pathologist:           Myesha Barclay MD                                                     Specimen:    Lymph Node(s), Axillary, Right                                                             Flow Interpretation   A. Lymph Node(s), Axillary, Right:  -Polytypic B cells  -No aberrant immunophenotype on T cells  -See comment    Electronically signed by Myesha Barclay MD on 9/14/2022 at  6:20 PM   Comment    There is no immunophenotypic evidence of non-Hodgkin lymphoma. Hodgkin lymphoma cannot be excluded by flow cytometry. T cell lymphomas cannot always be detected by this flow cytometry assay. Neoplastic cells, including large cells, may not survive specimen processing. This sample may not be representative. Final interpretation requires correlation with morphologic and clinical features.    Flow Phenotypic Data    71% of total events are CD45 positive and are viable by 7-AAD. A low percentage may be caused by low viability and/or a low number of CD45 positive cells. Of the CD45 positive leukocytes, 75% are viable by 7-AAD.       Unless otherwise indicated, percentages reported below are based on the total number of CD45 positive viable leukocytes. If applicable, percentage of plasma cells is from total viable nucleated cells     21% polytypic B cells     60% T cells with a CD4:CD8 ratio of 25:1     0.5% NK cells   Flow Processing Information    Multi-color flow analysis is performed for the following markers: CD2, CD3, CD4, CD5, CD7, CD8, CD10, CD16, CD19, CD20, CD34, CD38, CD45, CD56, CD57, and kappa and lambda immunoglobulin light chains. Cells are gated to isolate populations (CD45 versus side scatter and forward scatter versus side scatter), to exclude debris (forward scatter versus side scatter) and to exclude cell doublets (forward scatter height versus forward scatter width and side scatter height versus side scatter width). Forward scatter varies with cell size. Side scatter varies with the amount of cytoplasmic granules. Intensity for CD45 usually increases as hematolymphoid cells mature.   Clinical Information    66 year old female with lymphadenopathy         IMAGING:  MA DIAGNOSTIC BILATERAL W/ FABY, US AXILLARY RIGHT 5/18/2020 12:49 PM     HISTORY:  Palpable lump in right axilla.     COMPARISON:   12/12/2018.     TECHNIQUE:  Bilateral digital mammography with CAD is performed as  well as DBT. Directed right axillary ultrasound is also done.     BREAST DENSITY: Scattered fibroglandular densities.     BILATERAL MAMMOGRAM: No suspicious findings malignancy.     RIGHT AXILLARY ULTRASOUND: Directed sonography to the right axilla is  remarkable for some benign-appearing lymph nodes wit large fatty shayna.  The largest one measures 1 cm in short axis dimension.                                                                      IMPRESSION: BI-RADS CATEGORY: 2 - Benign Finding(s).     RECOMMENDED FOLLOW-UP: Annual Mammography.    MAMMOGRAM DIAGNOSTIC BILATERAL WITH TOMOSYNTHESIS, DIGITAL w/CAD;    TARGETED ULTRASOUND, RIGHT AXILLA - 12/31/2021 9:10 AM     HISTORY: History of mildly prominent lymph nodes in the right axilla.  States that this palpable lymph node right axilla may have slightly  increased in size. History of second COVID vaccination injection into  left arm and booster injection into left arm around the time of  Thanksgiving.     COMPARISON:  Mammograms dated 5/18/2020, 12/12/2018, 1/7/2015 and  1/7/2013. Right breast/axillary ultrasound dated 5/18/2020.     BREAST DENSITY: Heterogeneously dense.  Limiting mammographic sensitivity..     FINDINGS: Multiple benign-appearing lymph nodes are seen in the  bilateral axillary regions. One in the left axilla appears to be  slightly more prominent than those on the right. Patient describes no  symptoms in the left axilla. Palpable finding is in the left axilla  and appears to be associated with one of the benign-appearing lymph  nodes on mammogram. No other mammographic findings of concern for  malignancy.     Ultrasound of the right axilla at the site of symptoms demonstrates a  mildly prominent lymph node measuring 2.9 x 1.7 x 1.0 cm, with a fatty  hilum, and not appreciably changed in size since the prior study dated  5/18/2020. This is upper normal size but  does not appear appreciably  changed.                                                                      IMPRESSION: BI-RADS CATEGORY: 3 - Probably Benign Finding-Short  Interval Follow-Up Suggested. Upper normal size lymph node right  axilla corresponding with palpable finding. Recommend close clinical  follow-up. Six-month follow-up ultrasound is also recommended. Should  this increase in size or change in character then further evaluation  with biopsy (surgical excision) may be indicated.       RECOMMENDED FOLLOW-UP: Short Interval Follow-up 6 Months, ultrasound  of the right axilla in the area of the palpable finding. Clinical  follow-up is also recommended.     I discussed the findings and recommendations with the patient at the  time of the exam.      ALAN GOMES MD     ULTRASOUND AXILLARY RIGHT July 18, 2022 1:40 PM     CLINICAL HISTORY: Six month follow up right breast ultrasound 7-1-22.  BI-RADS category 3 mammogram result. Axillary adenopathy.     TECHNIQUE: Routine.     COMPARISON: Right axillary ultrasound dated 12/31/2021, 5/18/2020 and  1/14/2015.     FINDINGS: Prominent lymph node corresponds with palpable finding  measures 0.9 x 3.0 x 1.6 cm with a cortex measuring up to 0.6 cm in  thickness. This likely represents a normal reactive lymph node  although it has not decreased in prominence since the prior study  dated 12/31/2021. Cortex of this lymph node was very thin on the prior  study dated 1/14/2015. Further evaluation with tissue sampling is  recommended.                                                                      IMPRESSION: Right axillary lymph node does not appear significantly  changed in size since the most recent prior ultrasound although it has  not decreased in prominence. This likely represents an inflammatory  reactive lymph node, however, it has not decreased in prominence since  the most recent prior study. Neoplastic infiltration, though thought  less likely, is not  entirely excluded and further evaluation with  tissue sampling is recommended.     I discussed these findings and recommendations with the patient at the  time of the exam.     ALAN GOMES MD     ULTRASOUND-GUIDED RIGHT AXILLARY LYMPH NODE CORE BIOPSY  7/28/2022  8:44 AM      HISTORY: Right axillary adenopathy which has not resolved. Biopsy was  recommended to ensure no underlying malignancy.     COMPARISON: Mammograms dated 12/31/2021 and 5/18/2020, ultrasound  right axilla dated 12/31/2021 and 7/18/2022.     PROCEDURE: Following discussion of risks and benefits, consent was  obtained from the patient. An appropriate skin puncture site was  selected using sonographic guidance. The skin was then prepped and  draped in usual sterile fashion. 5 mL of 1% lidocaine without  epinephrine was infiltrated for local anesthetic and a 17-gauge trocar  was introduced and its tip was placed adjacent to the most prominent  lymph node in the right axilla via a lateral approach. A series of 6  samples were obtained with and 18-gauge core-cutting needle. Three of  the samples were placed in formalin and the other 3 samples were  placed in RPMI solution. All samples were sent to pathology for  appropriate evaluation.     She tolerated the procedure without difficulty and there was no  immediate complication.      PHYSICIAN:  Dr. Alan Gomes.  ASSISTANTS: Ultrasound technologists.  PROCEDURE: Right axillary biopsy.  FINDINGS:  Biopsy needle is seen in the cortex of the prominent lymph  node on all biopsy attempts.  ESTIMATED BLOOD LOSS:  Less than 5 mL.  SPECIMENS REMOVED: 6 x 18 gauge core biopsy samples.  POSTOP DIAGNOSIS: Successful right axillary biopsy.                                                                      IMPRESSION:   1. Technically successful right axillary biopsy. No immediate  complications. Final diagnosis is awaiting pathologic evaluation.  2. Post procedure mammograms for marker placement.     ALAN  "MD ELISEO     ASSESSMENT/PLAN:  Levar Sesay is a 67 year old female who was referred to the Hematology/Oncology Clinic for right axillary lymphadenopathy.    #right axillary SANDY  - present since at least 2015, I personally reviewed the radiology reports for all of the US and mammograms indicated above since then  - s/p core biopsy 7/22 and excisional biopsy 9/22  - reviewed both surgical pathology and flow cytometry results from both with patient in great detail, all questions answered  - pathology and flow cytometry reviewed as above, no evidence of malignancy, architecture WNL, stains of immune cells WNL, normal polytypic B cells, T cells w/no aberrant immunophenotype.   -Flow cytometry notes \"Hodgkin lymphoma cannot be excluded by flow cytometry. T cell lymphomas cannot always be detected by this flow cytometry assay. Neoplastic cells, including large cells, may not survive specimen processing. This sample may not be representative. Final interpretation requires correlation with morphologic and clinical features. \" which is a general disclaimer, these have been ruled out on the actual surgical pathologic analysis as noted above and pt does not have any clinical features suggestive of these   - above results do suggest reactive/inflammatory process  - 5/2020 Hepatitis C antibody and HIV antigen antibody combo negative, pt does not wish for repeat HIV antigen antibody screen  - 12/2021 TSH 2.54  - check CBC, CMP, LDH, hepatitis B and C, PAT, RF, anti-CCP Abs, ESR, CRP  - recommend age appropriate cancer screening including mammogram due in 12/2022 (ordered),  pap smear (results from 11/2018 show ASCUS w/transformation zone present) with gyn, colonoscopy to be coordinated with PCP   - last MRI 12/2005 states \"given that there can be some overlap between the appearance of benign endochondroma and low-grade chondrosarcoma, follow-up imaging may be advised to ensure the stability of this lesion, particularly " "if there is pain associated with the distal femur closed \", pt denies pain, B sx, in agreement to hold further w/u regarding this as she is asx  - Pt is going to [place] and will return 11/17/2022    RTC 11/29 or 11/30 with me for f/u for the above labs which were drawn today    Anson Beebe DO  Hematology/Oncology  [facility]    Future Appointments   Date Time Provider Department Center   10/5/2022 11:00 AM Anson Beebe DO [facility]          Again, thank you for allowing me to participate in the care of your patient.        Sincerely,        ANSON BEEBE DO    "

## 2022-10-06 LAB
ANA SER QL IF: NEGATIVE
CCP AB SER IA-ACNC: 1.1 U/ML
HBV CORE AB SERPL QL IA: NONREACTIVE
HBV SURFACE AB SERPL IA-ACNC: 0.32 M[IU]/ML
HBV SURFACE AB SERPL IA-ACNC: NONREACTIVE M[IU]/ML
HBV SURFACE AG SERPL QL IA: NONREACTIVE
HCV AB SERPL QL IA: NONREACTIVE
HCV RNA SERPL NAA+PROBE-ACNC: NOT DETECTED IU/ML
RHEUMATOID FACT SER NEPH-ACNC: 8 IU/ML

## 2022-10-31 ENCOUNTER — TELEPHONE (OUTPATIENT)
Dept: SURGERY | Facility: CLINIC | Age: 67
End: 2022-10-31

## 2022-10-31 ENCOUNTER — NURSE TRIAGE (OUTPATIENT)
Dept: NURSING | Facility: CLINIC | Age: 67
End: 2022-10-31

## 2022-10-31 NOTE — TELEPHONE ENCOUNTER
Left message on answering machine for patient to call back.  Need traige.  Laila STEVEN   [facility]

## 2022-10-31 NOTE — TELEPHONE ENCOUNTER
Reason for Call:  Other call back    Detailed comments: Patient calling-right now she is in [place]. Pt states she had lymph nodes removed -surgery 09/12/22 with Dr. Bahena. Pt states she was told fluid pocket under her arm should have gone away. Pt reports fluid pocket is filling back up. Area is tender, red, and hardening. Pt reports no fever. Pt states she can come back to [place] early, if necessary.  Please advise.     Phone Number Patient can be reached at: Other phone number:  : [phone]    Best Time: any    Can we leave a detailed message on this number? YES    Call taken on 10/31/2022 at 8:35 AM by Adarsh Quezada

## 2022-11-01 NOTE — TELEPHONE ENCOUNTER
Situation: Pt calling to get an antibiotic sent to her in [place]     Background: Had surgery on her arm September 12th and thinks it is now infected     Assessment: Noticed swelling and soreness around incision site on Thursday then yesterday woke up clammy and today her arm is very red and sore. Unsure if she has a temperature. Taking ibuprofen for pain     Recommendation: pt is currently in [place], advised she needs to be seen in person in the state she is in. She states she will go to [facility] tomorrow morning first thing        Reason for Disposition    Health Information question, no triage required and triager able to answer question    Protocols used: INFORMATION ONLY CALL - NO TRIAGE-A-      Grecia Pina RN [facility] October 31, 2022 7:57 PM

## 2022-12-07 ENCOUNTER — ONCOLOGY VISIT (OUTPATIENT)
Dept: ONCOLOGY | Facility: CLINIC | Age: 67
End: 2022-12-07
Payer: COMMERCIAL

## 2022-12-07 VITALS
HEIGHT: 63 IN | SYSTOLIC BLOOD PRESSURE: 130 MMHG | BODY MASS INDEX: 26.61 KG/M2 | DIASTOLIC BLOOD PRESSURE: 70 MMHG | TEMPERATURE: 98.5 F | WEIGHT: 150.2 LBS

## 2022-12-07 DIAGNOSIS — R59.0 LYMPHADENOPATHY, AXILLARY: Primary | ICD-10-CM

## 2022-12-07 PROCEDURE — 99213 OFFICE O/P EST LOW 20 MIN: CPT | Performed by: INTERNAL MEDICINE

## 2022-12-07 ASSESSMENT — PAIN SCALES - GENERAL: PAINLEVEL: NO PAIN (0)

## 2022-12-07 NOTE — PROGRESS NOTES
[facility]    Hematology/Oncology Established Patient Follow-up Note    Treatment Summary:      Today's Date: 12/7/2022    Reason for Follow-up: right axillary SANDY      HISTORY OF PRESENT ILLNESS: Levar Sesay is a 67 year old female who presents for follow up.    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     -2012- pt first noted enlarged right axillary lymph node, did not pursue medical evaluation 2/2 insurance issues     -1/2015- right axillary ultrasound- normal-appearing lymph nodes are seen in right axilla, largest node measures about 2.5 cm however the node is of normal morphology with normal fatty shayna.  No suspicious masses or other abnormalities are identified.     -5/2020- bilateral mammogram with right axillary ultrasound-benign appearing lymph node with large fatty shayna, largest 1 measures 1 cm in short axis dimension     - 12/2021- bilateral mammogram with right axillary ultrasound- multiple benign-appearing lymph nodes are seen in the bilateral axillary regions.  1 in the left axilla more prominent than those on the right.  Ultrasound of the right axilla at site of symptoms (patient reports enlarging lymph node), demonstrates mildly prominent lymph node measuring 2.9 x 1.7 x 1.0 cm with a fatty hilum and not appreciably changed in size since the prior study 5/2020.     - 7/2022-right axillary ultrasound- prominent lymph node corresponds with palpable finding measures 0.9 x 3.0 x 1.6 cm with a cortex measuring 0.6 cm in thickness, likely represents normal reactive lymph node although it is not decreased in prominence since prior study 12/2021.     - 7/2022-ultrasound-guided right axillary lymph node core biopsy- 6 samples obtained with 18-gauge core cutting needle, pathology  and flow cytometry full details below and summarized as:  PATHOLOGY  ---no malignancy, granulomas, necrosis  ---increased neutrophils in subcapsular and sinusoidal spaces  ---retained architecture, appropriate stains of T-cells, B-cells, germinal center lymphocytes, follicular dendritic cells   -- CD15 and CD30 stains do not support the presence of Hodgkin/Pascual-Marie cells.  FLOW CYTOMETRY  -Polytypic B cells  -No aberrant immunophenotype on T cells; increased CD4:CD8 ratio among T cells  -no immunophenotypic evidence of non-Hodgkin lymphoma     -9/2022- right axillary lymph node excision of 2 lymph nodes, pathology and flow cytometry full details below and summarized as:  PATHOLOGY  --mild follicular hyperplasia  --mild neutrophil infiltrate in subcapsular and sinusoidal spaces, raises the possibility of an inflammatory or reactive etiology.  --negative for malignancy  --morphologic and IHC similar to previous (both lymph nodes normal architecture is preserved.  There is mild follicular hyperplasia. Sinuses are patent with mild acute inflammation intermixed with benign histocytes and lymphocytes. There are scattered plasma cells. There is no evidence of Hodgkin or non-Hodgkin lymphoma, granulomas, necrosis or metastatic tumor. Immunostains appropriate)   FLOW CYTOMETRY  -Polytypic B cells  -No aberrant immunophenotype on T cells     Pt denies weight loss, lymphadenopathy, night sweats. Cancer screening hx includes mammogram in 12/2021 (Heterogeneously dense, limiting mammographic sensitivity), pap smear (results from 11/2018 show ASCUS w/transformation zone present), colonoscopy in [facility] 1/2015 (normal looking colon). Arthritis in b/l hands particularly thumbs. Pt denies significant right knee or leg pain, at site of endochondroma- pt reports she had some f/u with XRays after right knee surgery but was told she didn't need further f/u. Pt reports some tobacco use in high school but not  otherwise. 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     INTERIM HISTORY:  -10/31/2022 pt called nurse triage line, concern for cellulitis of right axillary lymph node dissection site, seen at urgent care, given abx (levaquin x7 days) and reports cultures were negative     - recent flu infection over Thanksgiving, reports COVID was negative    - denies new LN, B sx.      REVIEW OF SYSTEMS:   A 14 point ROS was reviewed with pertinent positives and negatives in the HPI.       HOME MEDICATIONS:  Current Outpatient Medications   Medication Sig Dispense Refill     calcium carbonate 750 MG CHEW Take 750 mg by mouth daily       MULTIPLE VITAMIN OR None Entered       Oxymetazoline HCl (NASAL SPRAY 12 HOUR NA)        docusate sodium (COLACE) 100 MG capsule Take 1 capsule (100 mg) by mouth 2 times daily Take while on opiate to prevent constipation (Patient not taking: Reported on 10/5/2022)  0     triamcinolone (KENALOG) 0.1 % external ointment Apply topically 2 times daily (Patient not taking: Reported on 2022) 30 g 1         ALLERGIES:  Allergies   Allergen Reactions     Sulfa Drugs      Sulfa     Trace Metals Rash         PAST MEDICAL HISTORY:  Past Medical History:   Diagnosis Date     Abnormal glandular Papanicolaou smear of cervix 2003    ASCUS; negative HPV     ASCUS of cervix with negative high risk HPV 2018    plan to repeat Pap+HPV cotesting in 3 years ()         PAST SURGICAL HISTORY:  Past Surgical History:   Procedure Laterality Date     BIOPSY LYMPH NODE AXILLA Right 2022    Procedure: Right axillary excisional lymph node dissection;  Surgeon: Ruslan Bahena DO;  Location: [facility] OR     SURGICAL HISTORY OF -       Tonsillectomy     SURGICAL HISTORY OF -   1986         SURGICAL HISTORY OF -       D & C     SURGICAL HISTORY OF -   2005    carpal tunnel surgery         SOCIAL  HISTORY:  Social History     Socioeconomic History     Marital status:      Spouse name: Not on file     Number of children: Not on file     Years of education: Not on file     Highest education level: Not on file   Occupational History     Not on file   Tobacco Use     Smoking status: Never     Smokeless tobacco: Never   Substance and Sexual Activity     Alcohol use: Yes     Comment: weekends     Drug use: No     Sexual activity: Yes     Partners: Male   Other Topics Concern     Parent/sibling w/ CABG, MI or angioplasty before 65F 55M? Not Asked   Social History Narrative     Not on file     Social Determinants of Health     Financial Resource Strain: Not on file   Food Insecurity: Not on file   Transportation Needs: Not on file   Physical Activity: Not on file   Stress: Not on file   Social Connections: Not on file   Intimate Partner Violence: Not on file   Housing Stability: Not on file         FAMILY HISTORY:  Family History   Problem Relation Age of Onset     Hypertension Mother      Cancer Mother         BCC     Arthritis Mother         unspecified     Eye Disorder Mother         glaucoma, cataract     Cerebrovascular Disease Father      Cancer Father         lung  - smoked and in [employer]     Respiratory Father      Cancer Maternal Grandmother         Uterine     Alcohol/Drug Maternal Grandfather      Cancer Paternal Grandmother         Uterine     Cancer Half-Sister         Lung; smoked     Arthritis Half-Sister      Thyroid Disease Other      Depression Sister    Family history-  Father- lung cancer diagnosed around age 75 s/p chemotherapy and subsequently passed 4-5 years after diagnosis, +heavy smoker  Half sister- lung cancer diagnosis around mid-50s s/p chemotherapy, +smoker   Mother- passed at age 90 2/2 PE, skin cancer ?related to radiation tx in younger years for acne  5 other sisters- no hx of cancer  Maternal GM- uterine CA  Paternal GM- uterine CA  No hx of lymphoma, leukemias      PHYSICAL  "EXAM:  Vital signs:  /70   Temp 98.5  F (36.9  C) (Temporal)   Ht 1.6 m (5' 3\")   Wt 68.1 kg (150 lb 3.2 oz)   LMP 09/15/2003   BMI 26.61 kg/m     ECOG:   GENERAL/CONSTITUTIONAL: No acute distress.  EYES: Pupils are equal and round. Extraocular movements intact without nystagmus.  No scleral icterus.  RESPIRATORY: Equal chest rise.   MUSCULOSKELETAL: Warm and well-perfused, no cyanosis, clubbing, or edema.   NEUROLOGIC: Cranial nerves are grossly intact. Alert, oriented to person, place and time, answers questions appropriately.  INTEGUMENTARY: No rashes or jaundice.  LYMPH NODES: right axillary LN excision site is normal, no drainage or erythema. No submandibular, submental, anterior or posterior cervical, supraclavicular adenopathy  GAIT: Steady, does not use assistive device        LABS:  CBC RESULTS:   Recent Labs   Lab Test 10/05/22  1209   WBC 7.2   RBC 4.75   HGB 14.8   HCT 44.7   MCV 94   MCH 31.2   MCHC 33.1   RDW 11.5          Recent Labs   Lab Test 10/05/22  1209 12/23/21  1104    138   POTASSIUM 4.1 4.3   CHLORIDE 107 106   CO2 30 29   ANIONGAP 3 3   GLC 97 101*   BUN 9 10   CR 0.50* 0.53   ABELINO 10.0 9.2         PATHOLOGY:      IMAGING:      ASSESSMENT/PLAN:  Levar Sesay is a 67 year old female with:    #right axillary SANDY  - present since at least 2015, I personally reviewed the radiology reports for all of the US and mammograms indicated above since then  - s/p core biopsy 7/22 and excisional biopsy 9/22  - reviewed both surgical pathology and flow cytometry results from both with patient in great detail, all questions answered  - pathology and flow cytometry reviewed as above, no evidence of malignancy, architecture WNL, stains of immune cells WNL, normal polytypic B cells, T cells w/no aberrant immunophenotype.   -Flow cytometry notes \"Hodgkin lymphoma cannot be excluded by flow cytometry. T cell lymphomas cannot always be detected by this flow cytometry assay. " "Neoplastic cells, including large cells, may not survive specimen processing. This sample may not be representative. Final interpretation requires correlation with morphologic and clinical features. \" which is a general disclaimer, these have been ruled out on the actual surgical pathologic analysis as noted above and pt does not have any clinical features suggestive of these   - above results do suggest reactive/inflammatory process  - 5/2020 Hepatitis C antibody and HIV antigen antibody combo negative, pt does not wish for repeat HIV antigen antibody screen  - 12/2021 TSH 2.54  - 10/22 labs reviewed and include:  CMP WNL, CBC WNL  CRP negative, ESR negative, RF normal, PAT negative, anti CCP Abs negative   LDH normal  Hepatitis B and C negative     - recommend age appropriate cancer screening including mammogram due in 12/2022 (scheduled for 1/3/23),  pap smear (results from 11/2018 show ASCUS w/transformation zone present) with gyn, next colonoscopy to be coordinated with PCP (1/2015 colonoscopy normal)     - last MRI 12/2005 states \"given that there can be some overlap between the appearance of benign endochondroma and low-grade chondrosarcoma, follow-up imaging may be advised to ensure the stability of this lesion, particularly if there is pain associated with the distal femur\", pt denies pain, B sx, would like to hold further w/u regarding this as she is asx. Continue monitoring for this with PCP    RTC prn, continue follow up with PCP as detailed above    Zulay Fuentes DO  Hematology/Oncology  [facility]    Future Appointments   Date Time Provider Department Center   12/7/2022  8:45 AM Zulay Fuentes DO [facility]   1/3/2023  8:00 AM [address]      "

## 2022-12-07 NOTE — LETTER
12/7/2022         RE: Levar Sesay  [id number] [facility] [address]        Dear Colleague,    Thank you for referring your patient, Levar Sesay, to the [facility]. Please see a copy of my visit note below.    [facility]    Hematology/Oncology Established Patient Follow-up Note    Treatment Summary:      Today's Date: 12/7/2022    Reason for Follow-up: right axillary SANDY      HISTORY OF PRESENT ILLNESS: Levar Sesay is a 67 year old female who presents for follow up.    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     -2012- pt first noted enlarged right axillary lymph node, did not pursue medical evaluation 2/2 insurance issues     -1/2015- right axillary ultrasound- normal-appearing lymph nodes are seen in right axilla, largest node measures about 2.5 cm however the node is of normal morphology with normal fatty shayna.  No suspicious masses or other abnormalities are identified.     -5/2020- bilateral mammogram with right axillary ultrasound-benign appearing lymph node with large fatty shayna, largest 1 measures 1 cm in short axis dimension     - 12/2021- bilateral mammogram with right axillary ultrasound- multiple benign-appearing lymph nodes are seen in the bilateral axillary regions.  1 in the left axilla more prominent than those on the right.  Ultrasound of the right axilla at site of symptoms (patient reports enlarging lymph node), demonstrates mildly prominent lymph node measuring 2.9 x 1.7 x 1.0 cm with a fatty hilum and not appreciably changed in size since the prior study 5/2020.     - 7/2022-right axillary ultrasound- prominent lymph node corresponds with palpable finding measures 0.9 x 3.0 x 1.6 cm with a cortex measuring 0.6  cm in thickness, likely represents normal reactive lymph node although it is not decreased in prominence since prior study 12/2021.     - 7/2022-ultrasound-guided right axillary lymph node core biopsy- 6 samples obtained with 18-gauge core cutting needle, pathology and flow cytometry full details below and summarized as:  PATHOLOGY  ---no malignancy, granulomas, necrosis  ---increased neutrophils in subcapsular and sinusoidal spaces  ---retained architecture, appropriate stains of T-cells, B-cells, germinal center lymphocytes, follicular dendritic cells   -- CD15 and CD30 stains do not support the presence of Hodgkin/Pascual-Marie cells.  FLOW CYTOMETRY  -Polytypic B cells  -No aberrant immunophenotype on T cells; increased CD4:CD8 ratio among T cells  -no immunophenotypic evidence of non-Hodgkin lymphoma     -9/2022- right axillary lymph node excision of 2 lymph nodes, pathology and flow cytometry full details below and summarized as:  PATHOLOGY  --mild follicular hyperplasia  --mild neutrophil infiltrate in subcapsular and sinusoidal spaces, raises the possibility of an inflammatory or reactive etiology.  --negative for malignancy  --morphologic and IHC similar to previous (both lymph nodes normal architecture is preserved.  There is mild follicular hyperplasia. Sinuses are patent with mild acute inflammation intermixed with benign histocytes and lymphocytes. There are scattered plasma cells. There is no evidence of Hodgkin or non-Hodgkin lymphoma, granulomas, necrosis or metastatic tumor. Immunostains appropriate)   FLOW CYTOMETRY  -Polytypic B cells  -No aberrant immunophenotype on T cells     Pt denies weight loss, lymphadenopathy, night sweats. Cancer screening hx includes mammogram in 12/2021 (Heterogeneously dense, limiting mammographic sensitivity), pap smear (results from 11/2018 show ASCUS w/transformation zone present), colonoscopy in [facility] 1/2015 (normal looking colon). Arthritis  in b/l hands particularly thumbs. Pt denies significant right knee or leg pain, at site of endochondroma- pt reports she had some f/u with XRays after right knee surgery but was told she didn't need further f/u. Pt reports some tobacco use in high school but not otherwise. 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     INTERIM HISTORY:  -10/31/2022 pt called nurse triage line, concern for cellulitis of right axillary lymph node dissection site, seen at urgent care, given abx (levaquin x7 days) and reports cultures were negative     - recent flu infection over Thanksgiving, reports COVID was negative    - denies new LN, B sx.      REVIEW OF SYSTEMS:   A 14 point ROS was reviewed with pertinent positives and negatives in the HPI.       HOME MEDICATIONS:  Current Outpatient Medications   Medication Sig Dispense Refill     calcium carbonate 750 MG CHEW Take 750 mg by mouth daily       MULTIPLE VITAMIN OR None Entered       Oxymetazoline HCl (NASAL SPRAY 12 HOUR NA)        docusate sodium (COLACE) 100 MG capsule Take 1 capsule (100 mg) by mouth 2 times daily Take while on opiate to prevent constipation (Patient not taking: Reported on 10/5/2022)  0     triamcinolone (KENALOG) 0.1 % external ointment Apply topically 2 times daily (Patient not taking: Reported on 12/7/2022) 30 g 1         ALLERGIES:  Allergies   Allergen Reactions     Sulfa Drugs      Sulfa     Trace Metals Rash         PAST MEDICAL HISTORY:  Past Medical History:   Diagnosis Date     Abnormal glandular Papanicolaou smear of cervix 02/2003    ASCUS; negative HPV     ASCUS of cervix with negative high risk HPV 11/2018    plan to repeat Pap+HPV cotesting in 3 years (2021)         PAST SURGICAL HISTORY:  Past Surgical History:   Procedure Laterality Date     BIOPSY LYMPH NODE AXILLA Right 9/12/2022    Procedure: Right axillary excisional lymph node dissection;  Surgeon: Ruslan Bahena  DO Tomasz;  Location: [facility] OR     SURGICAL HISTORY OF -       Tonsillectomy     SURGICAL HISTORY OF -   1986         SURGICAL HISTORY OF -       D & C     SURGICAL HISTORY OF -   2005    carpal tunnel surgery         SOCIAL HISTORY:  Social History     Socioeconomic History     Marital status:      Spouse name: Not on file     Number of children: Not on file     Years of education: Not on file     Highest education level: Not on file   Occupational History     Not on file   Tobacco Use     Smoking status: Never     Smokeless tobacco: Never   Substance and Sexual Activity     Alcohol use: Yes     Comment: weekends     Drug use: No     Sexual activity: Yes     Partners: Male   Other Topics Concern     Parent/sibling w/ CABG, MI or angioplasty before 65F 55M? Not Asked   Social History Narrative     Not on file     Social Determinants of Health     Financial Resource Strain: Not on file   Food Insecurity: Not on file   Transportation Needs: Not on file   Physical Activity: Not on file   Stress: Not on file   Social Connections: Not on file   Intimate Partner Violence: Not on file   Housing Stability: Not on file         FAMILY HISTORY:  Family History   Problem Relation Age of Onset     Hypertension Mother      Cancer Mother         BCC     Arthritis Mother         unspecified     Eye Disorder Mother         glaucoma, cataract     Cerebrovascular Disease Father      Cancer Father         lung  - smoked and in [employer]     Respiratory Father      Cancer Maternal Grandmother         Uterine     Alcohol/Drug Maternal Grandfather      Cancer Paternal Grandmother         Uterine     Cancer Half-Sister         Lung; smoked     Arthritis Half-Sister      Thyroid Disease Other      Depression Sister    Family history-  Father- lung cancer diagnosed around age 75 s/p chemotherapy and subsequently passed 4-5 years after diagnosis, +heavy smoker  Half sister- lung cancer diagnosis around mid-50s s/p  "chemotherapy, +smoker   Mother- passed at age 90 2/2 PE, skin cancer ?related to radiation tx in younger years for acne  5 other sisters- no hx of cancer  Maternal GM- uterine CA  Paternal GM- uterine CA  No hx of lymphoma, leukemias      PHYSICAL EXAM:  Vital signs:  /70   Temp 98.5  F (36.9  C) (Temporal)   Ht 1.6 m (5' 3\")   Wt 68.1 kg (150 lb 3.2 oz)   LMP 09/15/2003   BMI 26.61 kg/m     ECOG:   GENERAL/CONSTITUTIONAL: No acute distress.  EYES: Pupils are equal and round. Extraocular movements intact without nystagmus.  No scleral icterus.  RESPIRATORY: Equal chest rise.   MUSCULOSKELETAL: Warm and well-perfused, no cyanosis, clubbing, or edema.   NEUROLOGIC: Cranial nerves are grossly intact. Alert, oriented to person, place and time, answers questions appropriately.  INTEGUMENTARY: No rashes or jaundice.  LYMPH NODES: right axillary LN excision site is normal, no drainage or erythema. No submandibular, submental, anterior or posterior cervical, supraclavicular adenopathy  GAIT: Steady, does not use assistive device        LABS:  CBC RESULTS:   Recent Labs   Lab Test 10/05/22  1209   WBC 7.2   RBC 4.75   HGB 14.8   HCT 44.7   MCV 94   MCH 31.2   MCHC 33.1   RDW 11.5          Recent Labs   Lab Test 10/05/22  1209 12/23/21  1104    138   POTASSIUM 4.1 4.3   CHLORIDE 107 106   CO2 30 29   ANIONGAP 3 3   GLC 97 101*   BUN 9 10   CR 0.50* 0.53   ABELINO 10.0 9.2         PATHOLOGY:      IMAGING:      ASSESSMENT/PLAN:  Levar Sesay is a 67 year old female with:    #right axillary SANDY  - present since at least 2015, I personally reviewed the radiology reports for all of the US and mammograms indicated above since then  - s/p core biopsy 7/22 and excisional biopsy 9/22  - reviewed both surgical pathology and flow cytometry results from both with patient in great detail, all questions answered  - pathology and flow cytometry reviewed as above, no evidence of malignancy, architecture WNL, stains " "of immune cells WNL, normal polytypic B cells, T cells w/no aberrant immunophenotype.   -Flow cytometry notes \"Hodgkin lymphoma cannot be excluded by flow cytometry. T cell lymphomas cannot always be detected by this flow cytometry assay. Neoplastic cells, including large cells, may not survive specimen processing. This sample may not be representative. Final interpretation requires correlation with morphologic and clinical features. \" which is a general disclaimer, these have been ruled out on the actual surgical pathologic analysis as noted above and pt does not have any clinical features suggestive of these   - above results do suggest reactive/inflammatory process  - 5/2020 Hepatitis C antibody and HIV antigen antibody combo negative, pt does not wish for repeat HIV antigen antibody screen  - 12/2021 TSH 2.54  - 10/22 labs reviewed and include:  CMP WNL, CBC WNL  CRP negative, ESR negative, RF normal, PAT negative, anti CCP Abs negative   LDH normal  Hepatitis B and C negative     - recommend age appropriate cancer screening including mammogram due in 12/2022 (scheduled for 1/3/23),  pap smear (results from 11/2018 show ASCUS w/transformation zone present) with gyn, next colonoscopy to be coordinated with PCP (1/2015 colonoscopy normal)     - last MRI 12/2005 states \"given that there can be some overlap between the appearance of benign endochondroma and low-grade chondrosarcoma, follow-up imaging may be advised to ensure the stability of this lesion, particularly if there is pain associated with the distal femur\", pt denies pain, B sx, would like to hold further w/u regarding this as she is asx. Continue monitoring for this with PCP    RTC prn, continue follow up with PCP as detailed above    Zulay Fuentes DO  Hematology/Oncology  [facility]    Future Appointments   Date Time Provider Department Center   12/7/2022  8:45 AM Zulay Fuentes DO [facility]   1/3/2023  8:00 AM [facility] " [facility]          Again, thank you for allowing me to participate in the care of your patient.        Sincerely,        ANSON BEEBE, DO

## 2023-01-03 ENCOUNTER — HOSPITAL ENCOUNTER (OUTPATIENT)
Dept: MAMMOGRAPHY | Facility: CLINIC | Age: 68
Discharge: HOME | End: 2023-01-03
Attending: INTERNAL MEDICINE | Admitting: INTERNAL MEDICINE
Payer: COMMERCIAL

## 2023-01-03 DIAGNOSIS — Z12.31 ENCOUNTER FOR SCREENING MAMMOGRAM FOR BREAST CANCER: ICD-10-CM

## 2023-01-03 PROCEDURE — 77067 SCR MAMMO BI INCL CAD: CPT

## 2023-12-28 ENCOUNTER — OFFICE VISIT (OUTPATIENT)
Dept: FAMILY MEDICINE | Facility: CLINIC | Age: 68
End: 2023-12-28
Payer: COMMERCIAL

## 2023-12-28 VITALS
RESPIRATION RATE: 18 BRPM | OXYGEN SATURATION: 97 % | SYSTOLIC BLOOD PRESSURE: 134 MMHG | WEIGHT: 155 LBS | DIASTOLIC BLOOD PRESSURE: 78 MMHG | HEART RATE: 89 BPM | HEIGHT: 63 IN | BODY MASS INDEX: 27.46 KG/M2 | TEMPERATURE: 97.7 F

## 2023-12-28 DIAGNOSIS — J01.40 ACUTE NON-RECURRENT PANSINUSITIS: Primary | ICD-10-CM

## 2023-12-28 DIAGNOSIS — B00.1 COLD SORE: ICD-10-CM

## 2023-12-28 DIAGNOSIS — Z87.09 HISTORY OF BACTERIAL SINUSITIS: ICD-10-CM

## 2023-12-28 PROCEDURE — 99213 OFFICE O/P EST LOW 20 MIN: CPT | Performed by: PHYSICIAN ASSISTANT

## 2023-12-28 RX ORDER — VALACYCLOVIR HYDROCHLORIDE 500 MG/1
500 TABLET, FILM COATED ORAL 2 TIMES DAILY
Qty: 24 TABLET | Refills: 2 | Status: SHIPPED | OUTPATIENT
Start: 2023-12-28

## 2023-12-28 RX ORDER — AMOXICILLIN 875 MG
875 TABLET ORAL 2 TIMES DAILY
Qty: 14 TABLET | Refills: 0 | Status: SHIPPED | OUTPATIENT
Start: 2023-12-28 | End: 2024-05-09

## 2023-12-28 RX ORDER — RESPIRATORY SYNCYTIAL VIRUS VACCINE 120MCG/0.5
0.5 KIT INTRAMUSCULAR ONCE
Qty: 1 EACH | Refills: 0 | Status: CANCELLED | OUTPATIENT
Start: 2023-12-28 | End: 2023-12-28

## 2023-12-28 RX ORDER — OXYMETAZOLINE HYDROCHLORIDE 0.05 G/100ML
2 SPRAY NASAL 2 TIMES DAILY
Qty: 15 ML | Refills: 0 | Status: SHIPPED | OUTPATIENT
Start: 2023-12-28

## 2023-12-28 ASSESSMENT — PAIN SCALES - GENERAL: PAINLEVEL: SEVERE PAIN (6)

## 2023-12-28 NOTE — PATIENT INSTRUCTIONS
Consider home covid test     Neti pot  Afrin (oxymetazoline) type spray max 3 days - similar to a sudafed decongestant  If does not work then consider sudafed (pseudoephedrine) pills - ok to use for 10-30 days     Nasonex type sprays are fluticasone or mometasone.  Steroids that knock down inflammation.  Optional but not the rapid relief of the above options.    Symptoms worsen - start antibiotics

## 2023-12-28 NOTE — PROGRESS NOTES
Assessment & Plan     Acute non-recurrent pansinusitis  Treat suspected viral at this time  - amoxicillin (AMOXIL) 875 MG tablet  Dispense: 14 tablet; Refill: 0  - oxymetazoline (NASAL SPRAY 12 HOUR) 0.05 % nasal spray  Dispense: 15 mL; Refill: 0    History of bacterial sinusitis  To start if symptoms worsen.  Reviewed symptoms that would indicate bacterial.  Did not tolerate augmentin in past.  - amoxicillin (AMOXIL) 875 MG tablet  Dispense: 14 tablet; Refill: 0    Cold sore  Requests to treat, recurrent  -valACYclovir (VALTREX) 500 MG tablet      Patient Instructions   Consider home covid test     Neti pot  Afrin (oxymetazoline) type spray max 3 days - similar to a sudafed decongestant  If does not work then consider sudafed (pseudoephedrine) pills - ok to use for 10-30 days     Nasonex type sprays are fluticasone or mometasone.  Steroids that knock down inflammation.  Optional but not the rapid relief of the above options.    Symptoms worsen - start antibiotics       Kika Funez PA-C  [facility]    Masoud Cristobal is a 68 year old, presenting for the following health issues:    Sinus Problem    History of Present Illness       Reason for visit:  Sinus infection  Symptom onset:  3-7 days ago  Symptoms include:  Sinus infection  Symptom intensity:  Severe  Symptom progression:  Staying the same  Had these symptoms before:  Yes  Has tried/received treatment for these symptoms:  Yes  Previous treatment was successful:  Yes  Prior treatment description:  5 day zpack  What makes it worse:  No  What makes it better:  No    She eats 0-1 servings of fruits and vegetables daily.She consumes 1 sweetened beverage(s) daily.She exercises with enough effort to increase her heart rate 30 to 60 minutes per day.  She exercises with enough effort to increase her heart rate 4 days per week.   She is taking medications regularly.     Started maybe a couple days before Dyllan but then  "woke up much worse on Salters.  Sneezing.  Bilateral facial maxillary ethmoid and frontal sinus pressure.  Variably runny nose, clear.  She wonders if having a fever off and on.  Temporal squeezing headache.  No cough.  No other symptoms.  Taking advil, otc sinus medication.    History sinus infections starting after a root canal in maybe 2005.  Had 4 infections that yr then got them off and on in the spring or fall, but has been good for a few years.  No history ENT visits or surgeries.      No ill contacts. Not a lot of exposure to people.       Going to [place] on 1/7.  To stay the winter.        Objective    /78 (BP Location: Right arm, Patient Position: Sitting, Cuff Size: Adult Regular)   Pulse 89   Temp 97.7  F (36.5  C) (Tympanic)   Resp 18   Ht 1.6 m (5' 2.99\")   Wt 70.3 kg (155 lb)   LMP 09/15/2003   SpO2 97%   BMI 27.46 kg/m    Body mass index is 27.46 kg/m .  Unremarkable exam              "

## 2023-12-28 NOTE — NURSING NOTE
"Chief Complaint   Patient presents with    Sinus Problem       Initial LMP 09/15/2003  Estimated body mass index is 26.61 kg/m  as calculated from the following:    Height as of 12/7/22: 1.6 m (5' 3\").    Weight as of 12/7/22: 68.1 kg (150 lb 3.2 oz).    Patient presents to the clinic using No DME    Is there anyone who you would like to be able to receive your results? No  If yes have patient fill out SHANKAR      "

## 2024-05-01 ENCOUNTER — HOSPITAL ENCOUNTER (OUTPATIENT)
Dept: MAMMOGRAPHY | Facility: CLINIC | Age: 69
Discharge: HOME | End: 2024-05-01
Attending: NURSE PRACTITIONER | Admitting: NURSE PRACTITIONER
Payer: COMMERCIAL

## 2024-05-01 DIAGNOSIS — Z12.31 VISIT FOR SCREENING MAMMOGRAM: ICD-10-CM

## 2024-05-01 PROCEDURE — 77063 BREAST TOMOSYNTHESIS BI: CPT

## 2024-05-09 ENCOUNTER — OFFICE VISIT (OUTPATIENT)
Dept: FAMILY MEDICINE | Facility: CLINIC | Age: 69
End: 2024-05-09
Payer: COMMERCIAL

## 2024-05-09 VITALS
HEART RATE: 72 BPM | WEIGHT: 144 LBS | DIASTOLIC BLOOD PRESSURE: 64 MMHG | OXYGEN SATURATION: 98 % | BODY MASS INDEX: 25.52 KG/M2 | HEIGHT: 63 IN | SYSTOLIC BLOOD PRESSURE: 112 MMHG | RESPIRATION RATE: 16 BRPM | TEMPERATURE: 97.9 F

## 2024-05-09 DIAGNOSIS — Z00.00 ENCOUNTER FOR MEDICARE ANNUAL WELLNESS EXAM: Primary | ICD-10-CM

## 2024-05-09 DIAGNOSIS — K40.90 NON-RECURRENT UNILATERAL INGUINAL HERNIA WITHOUT OBSTRUCTION OR GANGRENE: ICD-10-CM

## 2024-05-09 DIAGNOSIS — J30.2 SEASONAL ALLERGIC RHINITIS, UNSPECIFIED TRIGGER: ICD-10-CM

## 2024-05-09 DIAGNOSIS — Z78.0 MENOPAUSE: ICD-10-CM

## 2024-05-09 LAB
ALBUMIN SERPL BCG-MCNC: 4.3 G/DL (ref 3.5–5.2)
ALP SERPL-CCNC: 73 U/L (ref 40–150)
ALT SERPL W P-5'-P-CCNC: 14 U/L (ref 0–50)
ANION GAP SERPL CALCULATED.3IONS-SCNC: 10 MMOL/L (ref 7–15)
AST SERPL W P-5'-P-CCNC: 28 U/L (ref 0–45)
BILIRUB SERPL-MCNC: 0.6 MG/DL
BUN SERPL-MCNC: 7.9 MG/DL (ref 8–23)
CALCIUM SERPL-MCNC: 9.8 MG/DL (ref 8.8–10.2)
CHLORIDE SERPL-SCNC: 103 MMOL/L (ref 98–107)
CHOLEST SERPL-MCNC: 208 MG/DL
CREAT SERPL-MCNC: 0.59 MG/DL (ref 0.51–0.95)
DEPRECATED HCO3 PLAS-SCNC: 30 MMOL/L (ref 22–29)
EGFRCR SERPLBLD CKD-EPI 2021: >90 ML/MIN/1.73M2
ERYTHROCYTE [DISTWIDTH] IN BLOOD BY AUTOMATED COUNT: 12 % (ref 10–15)
FASTING STATUS PATIENT QL REPORTED: ABNORMAL
FASTING STATUS PATIENT QL REPORTED: ABNORMAL
GLUCOSE SERPL-MCNC: 108 MG/DL (ref 70–99)
HCT VFR BLD AUTO: 44.9 % (ref 35–47)
HDLC SERPL-MCNC: 115 MG/DL
HGB BLD-MCNC: 14.7 G/DL (ref 11.7–15.7)
LDLC SERPL CALC-MCNC: 86 MG/DL
MCH RBC QN AUTO: 31.5 PG (ref 26.5–33)
MCHC RBC AUTO-ENTMCNC: 32.7 G/DL (ref 31.5–36.5)
MCV RBC AUTO: 96 FL (ref 78–100)
NONHDLC SERPL-MCNC: 93 MG/DL
PLATELET # BLD AUTO: 201 10E3/UL (ref 150–450)
POTASSIUM SERPL-SCNC: 4.6 MMOL/L (ref 3.4–5.3)
PROT SERPL-MCNC: 7.5 G/DL (ref 6.4–8.3)
RBC # BLD AUTO: 4.66 10E6/UL (ref 3.8–5.2)
SODIUM SERPL-SCNC: 143 MMOL/L (ref 135–145)
TRIGL SERPL-MCNC: 37 MG/DL
TSH SERPL DL<=0.005 MIU/L-ACNC: 3.94 UIU/ML (ref 0.3–4.2)
WBC # BLD AUTO: 5 10E3/UL (ref 4–11)

## 2024-05-09 PROCEDURE — 80061 LIPID PANEL: CPT | Performed by: NURSE PRACTITIONER

## 2024-05-09 PROCEDURE — 85027 COMPLETE CBC AUTOMATED: CPT | Performed by: NURSE PRACTITIONER

## 2024-05-09 PROCEDURE — 84443 ASSAY THYROID STIM HORMONE: CPT | Performed by: NURSE PRACTITIONER

## 2024-05-09 PROCEDURE — 36415 COLL VENOUS BLD VENIPUNCTURE: CPT | Performed by: NURSE PRACTITIONER

## 2024-05-09 PROCEDURE — 99213 OFFICE O/P EST LOW 20 MIN: CPT | Mod: 25 | Performed by: NURSE PRACTITIONER

## 2024-05-09 PROCEDURE — G0438 PPPS, INITIAL VISIT: HCPCS | Performed by: NURSE PRACTITIONER

## 2024-05-09 PROCEDURE — 80053 COMPREHEN METABOLIC PANEL: CPT | Performed by: NURSE PRACTITIONER

## 2024-05-09 RX ORDER — RESPIRATORY SYNCYTIAL VIRUS VACCINE 120MCG/0.5
0.5 KIT INTRAMUSCULAR ONCE
Qty: 1 EACH | Refills: 0 | Status: CANCELLED | OUTPATIENT
Start: 2024-05-09 | End: 2024-05-09

## 2024-05-09 RX ORDER — FLUTICASONE PROPIONATE 50 MCG
1 SPRAY, SUSPENSION (ML) NASAL DAILY
Qty: 16 G | Refills: 3 | Status: SHIPPED | OUTPATIENT
Start: 2024-05-09

## 2024-05-09 SDOH — HEALTH STABILITY: PHYSICAL HEALTH: ON AVERAGE, HOW MANY DAYS PER WEEK DO YOU ENGAGE IN MODERATE TO STRENUOUS EXERCISE (LIKE A BRISK WALK)?: 4 DAYS

## 2024-05-09 ASSESSMENT — PAIN SCALES - GENERAL: PAINLEVEL: NO PAIN (0)

## 2024-05-09 ASSESSMENT — SOCIAL DETERMINANTS OF HEALTH (SDOH): HOW OFTEN DO YOU GET TOGETHER WITH FRIENDS OR RELATIVES?: TWICE A WEEK

## 2024-05-09 NOTE — PATIENT INSTRUCTIONS
"Preventive Care Advice   This is general advice we often give to help people stay healthy. Your care team may have specific advice just for you. Please talk to your care team about your own preventive care needs.  Lifestyle  Exercise at least 150 minutes each week (30 minutes a day, 5 days a week).  Do muscle strengthening activities 2 days a week. These help control your weight and prevent disease.  No smoking.  Wear sunscreen to prevent skin cancer.  Have your home tested for radon every 2 to 5 years. Radon is a colorless, odorless gas that can harm your lungs. To learn more, go to www.health.[facility].mn. and search for \"Radon in Homes.\"  Keep guns unloaded and locked up in a safe place like a safe or gun vault, or, use a gun lock and hide the keys. Always lock away bullets separately. To learn more, visit [employer].mn.gov and search for \"safe gun storage.\"  Nutrition  Eat 5 or more servings of fruits and vegetables each day.  Try wheat bread, brown rice and whole grain pasta (instead of white bread, rice, and pasta).  Get enough calcium and vitamin D. Check the label on foods and aim for 100% of the RDA (recommended daily allowance).  Regular exams  Have a dental exam and cleaning every 6 months.  See your health care team every year to talk about:  Any changes in your health.  Any medicines your care team has prescribed.  Preventive care, family planning, and ways to prevent chronic diseases.  Shots (vaccines)   HPV shots (up to age 26), if you've never had them before.  Hepatitis B shots (up to age 59), if you've never had them before.  COVID-19 shot: Get this shot when it's due.  Flu shot: Get a flu shot every year.  Tetanus shot: Get a tetanus shot every 10 years.  Pneumococcal, hepatitis A, and RSV shots: Ask your care team if you need these based on your risk.  Shingles shot (for age 50 and up).  General health tests  Diabetes screening:  Starting at age 35, Get screened for diabetes at least every 3 years.  If " you are younger than age 35, ask your care team if you should be screened for diabetes.  Cholesterol test: At age 39, start having a cholesterol test every 5 years, or more often if advised.  Bone density scan (DEXA): At age 50, ask your care team if you should have this scan for osteoporosis (brittle bones).  Hepatitis C: Get tested at least once in your life.  Abdominal aortic aneurysm screening: Talk to your doctor about having this screening if you:  Have ever smoked; and  Are biologically male; and  Are between the ages of 65 and 75.  STIs (sexually transmitted infections)  Before age 24: Ask your care team if you should be screened for STIs.  After age 24: Get screened for STIs if you're at risk. You are at risk for STIs (including HIV) if:  You are sexually active with more than one person.  You don't use condoms every time.  You or a partner was diagnosed with a sexually transmitted infection.  If you are at risk for HIV, ask about PrEP medicine to prevent HIV.  Get tested for HIV at least once in your life, whether you are at risk for HIV or not.  Cancer screening tests  Cervical cancer screening: If you have a cervix, begin getting regular cervical cancer screening tests at age 21. Most people who have regular screenings with normal results can stop after age 65. Talk about this with your provider.  Breast cancer scan (mammogram): If you've ever had breasts, begin having regular mammograms starting at age 40. This is a scan to check for breast cancer.  Colon cancer screening: It is important to start screening for colon cancer at age 45.  Have a colonoscopy test every 10 years (or more often if you're at risk) Or, ask your provider about stool tests like a FIT test every year or Cologuard test every 3 years.  To learn more about your testing options, visit: www.[employer]/718097.pdf.  For help making a decision, visit: lynda/ju58890.  Prostate cancer screening test: If you have a prostate and are age 55  to 69, ask your provider if you would benefit from a yearly prostate cancer screening test.  Lung cancer screening: If you are a current or former smoker age 50 to 80, ask your care team if ongoing lung cancer screenings are right for you.  For informational purposes only. Not to replace the advice of your health care provider. Copyright   2023 [employer]. All rights reserved. Clinically reviewed by the [facility] Transitions Program. [employer] 135394 - REV 04/24.

## 2024-05-09 NOTE — PROGRESS NOTES
"Preventive Care Visit  [facility]  SHYANNE Stallworth CNP, Family Medicine  May 9, 2024      Assessment & Plan     Encounter for Medicare annual wellness exam  - Comprehensive metabolic panel (BMP + Alb, Alk Phos, ALT, AST, Total. Bili, TP); Future  - TSH with free T4 reflex; Future  - CBC with platelets; Future  - Lipid panel reflex to direct LDL Fasting; Future  - Lipid panel reflex to direct LDL Fasting  - CBC with platelets  - TSH with free T4 reflex  - Comprehensive metabolic panel (BMP + Alb, Alk Phos, ALT, AST, Total. Bili, TP)    Seasonal allergic rhinitis, unspecified trigger  - fluticasone (FLONASE) 50 MCG/ACT nasal spray; Spray 1 spray into both nostrils daily    Non-recurrent unilateral inguinal hernia without obstruction or gangrene  Concerning for right inguinal hernia will have patient obtain ultrasound- US Hernia Evaluation; Future  - OFFICE/OUTPT VISIT,EST,LEVL III    Menopause  - DEXA HIP/PELVIS/SPINE - Future; Future    Patient has been advised of split billing requirements and indicates understanding: Yes          BMI  Estimated body mass index is 25.51 kg/m  as calculated from the following:    Height as of this encounter: 1.6 m (5' 3\").    Weight as of this encounter: 65.3 kg (144 lb).   Weight management plan: Discussed healthy diet and exercise guidelines    Counseling  Appropriate preventive services were discussed with this patient, including applicable screening as appropriate for fall prevention, nutrition, physical activity, Tobacco-use cessation, weight loss and cognition.  Checklist reviewing preventive services available has been given to the patient.  Reviewed patient's diet, addressing concerns and/or questions.       See Patient Instructions    Masoud Cristobal is a 68 year old, presenting for the following:  Physical        5/9/2024    10:31 AM   Additional Questions   Roomed by [facility] Care Directive  Patient does not have a " Health Care Directive or Living Will: Discussed advance care planning with patient; however, patient declined at this time.    HPI    Patient has a lump on her lower right abdomen that she noticed a couple weeks. It does not hurt. She notices it more when she is standing up.             5/9/2024   General Health   How would you rate your overall physical health? Good   Feel stress (tense, anxious, or unable to sleep) Not at all         5/9/2024   Nutrition   Diet: Regular (no restrictions)         5/9/2024   Exercise   Days per week of moderate/strenous exercise 4 days         5/9/2024   Social Factors   Frequency of gathering with friends or relatives Twice a week   Worry food won't last until get money to buy more No   Food not last or not have enough money for food? No   Do you have housing?  Yes   Are you worried about losing your housing? No   Lack of transportation? No   Unable to get utilities (heat,electricity)? No         5/9/2024   Fall Risk   Fallen 2 or more times in the past year? No   Trouble with walking or balance? No          5/9/2024   Activities of Daily Living- Home Safety   Needs help with the following daily activites None of the above   Safety concerns in the home None of the above         5/9/2024   Dental   Dentist two times every year? Yes         5/9/2024   Hearing Screening   Hearing concerns? None of the above         5/9/2024   Driving Risk Screening   Patient/family members have concerns about driving No         5/9/2024   General Alertness/Fatigue Screening   Have you been more tired than usual lately? No         5/9/2024   Urinary Incontinence Screening   Bothered by leaking urine in past 6 months No         5/9/2024   TB Screening   Were you born outside of the [place]? No         Today's PHQ-2 Score:       5/9/2024    10:29 AM   PHQ-2 ( 1999 [employer])   Q1: Little interest or pleasure in doing things 0   Q2: Feeling down, depressed or hopeless 0   PHQ-2 Score 0   Q1: Little interest or  pleasure in doing things Not at all   Q2: Feeling down, depressed or hopeless Not at all   PHQ-2 Score 0           2024   Substance Use   Alcohol more than 3/day or more than 7/wk No   Do you have a current opioid prescription? No   How severe/bad is pain from 1 to 10? 0/10 (No Pain)   Do you use any other substances recreationally? No     Social History     Tobacco Use    Smoking status: Never    Smokeless tobacco: Never   Substance Use Topics    Alcohol use: Yes     Comment: weekends    Drug use: No           2024   LAST FHS-7 RESULTS   1st degree relative breast or ovarian cancer No   Any relative bilateral breast cancer No   Any male have breast cancer No   Any ONE woman have BOTH breast AND ovarian cancer No   Any woman with breast cancer before 50yrs No   2 or more relatives with breast AND/OR ovarian cancer No   2 or more relatives with breast AND/OR bowel cancer No        Mammogram Screening - Mammogram every 1-2 years updated in Health Maintenance based on mutual decision making    ASCVD Risk   The ASCVD Risk score (Srinivasan BARRON, et al., 2019) failed to calculate for the following reasons:    The valid HDL cholesterol range is 20 to 100 mg/dL    Fracture Risk Assessment Tool  Link to Frax Calculator  Use the information below to complete the Frax calculator  : 1955  Sex: female  Weight (kg): 65.3 kg (actual weight)  Height (cm): 160 cm  Previous Fragility Fracture:  No  History of parent with fractured hip:  Yes  Current Smoking:  No  Patient has been on glucocorticoids for more than 3 months (5mg/day or more): Yes  Rheumatoid Arthritis on Problem List:  No  Secondary Osteoporosis on Problem List:  No  Consumes 3 or more units of alcohol per day: No  Femoral Neck BMD (g/cm2)            Reviewed and updated as needed this visit by Provider                    Labs reviewed in EPIC  BP Readings from Last 3 Encounters:   24 112/64   23 134/78   22 130/70    Wt Readings  from Last 3 Encounters:   24 65.3 kg (144 lb)   23 70.3 kg (155 lb)   22 68.1 kg (150 lb 3.2 oz)                  Patient Active Problem List   Diagnosis    Postmenopausal atrophic vaginitis    MENOPAUSE MENOPAUSAL SYMPTOMS    Rosacea     Past Surgical History:   Procedure Laterality Date    BIOPSY LYMPH NODE AXILLA Right 2022    Procedure: Right axillary excisional lymph node dissection;  Surgeon: Ruslan Bahena DO;  Location: [facility] OR    SURGICAL HISTORY OF -       Tonsillectomy    SURGICAL HISTORY OF -   1986        SURGICAL HISTORY OF -       D & C    SURGICAL HISTORY OF -   2005    carpal tunnel surgery       Social History     Tobacco Use    Smoking status: Never    Smokeless tobacco: Never   Substance Use Topics    Alcohol use: Yes     Comment: weekends     Family History   Problem Relation Age of Onset    Hypertension Mother     Cancer Mother         BCC    Arthritis Mother         unspecified    Eye Disorder Mother         glaucoma, cataract    Cerebrovascular Disease Father     Cancer Father         lung  - smoked and in navy    Respiratory Father     Cancer Maternal Grandmother         Uterine    Alcohol/Drug Maternal Grandfather     Cancer Paternal Grandmother         Uterine    Cancer Half-Sister         Lung; smoked    Arthritis Half-Sister     Thyroid Disease Other     Depression Sister          Current Outpatient Medications   Medication Sig Dispense Refill    calcium carbonate 750 MG CHEW Take 750 mg by mouth daily      MULTIPLE VITAMIN OR None Entered      oxymetazoline (NASAL SPRAY 12 HOUR) 0.05 % nasal spray Spray 2 sprays in nostril 2 times daily X 3 days 15 mL 0    valACYclovir (VALTREX) 500 MG tablet Take 1 tablet (500 mg) by mouth 2 times daily X 3 days at start of any cold sore outbreak. 24 tablet 2     Allergies   Allergen Reactions    Sulfa Antibiotics      Sulfa    Trace Minerals Cr-Cu-Mn-Zn Rash     Recent Labs   Lab Test  "10/05/22  1209 12/23/21  1104 11/26/18  1001   LDL  --  86 82   HDL  --  111 119   TRIG  --  55 55   ALT 20 21 21   CR 0.50* 0.53 0.62   GFRESTIMATED >90 >90 >90   GFRESTBLACK  --   --  >90   POTASSIUM 4.1 4.3 3.7   TSH  --  2.54 4.49*      Current providers sharing in care for this patient include:  Patient Care Team:  Palak Mckenzie APRN CNP as PCP - General (Nurse Practitioner - Family)  Zulay Fuentes DO as Physician (Internal Medicine)  Zulay Fuentes DO as Assigned Cancer Care Provider  Kika Funez PA-C as Assigned PCP    The following health maintenance items are reviewed in Epic and correct as of today:  Health Maintenance   Topic Date Due    DEXA  Never done    ZOSTER IMMUNIZATION (1 of 2) Never done    DTAP/TDAP/TD IMMUNIZATION (1 - Tdap) 01/02/2013    RSV VACCINE (Pregnancy & 60+) (1 - 1-dose 60+ series) Never done    Pneumococcal Vaccine: 65+ Years (1 of 1 - PCV) Never done    MEDICARE ANNUAL WELLNESS VISIT  12/23/2022    COVID-19 Vaccine (4 - 2023-24 season) 09/01/2023    INFLUENZA VACCINE (Season Ended) 09/01/2024    ANNUAL REVIEW OF  ORDERS  12/28/2024    COLORECTAL CANCER SCREENING  01/15/2025    FALL RISK ASSESSMENT  05/09/2025    GLUCOSE  10/05/2025    MAMMO SCREENING  05/01/2026    LIPID  12/23/2026    ADVANCE CARE PLANNING  09/09/2027    HEPATITIS C SCREENING  Completed    PHQ-2 (once per calendar year)  Completed    IPV IMMUNIZATION  Aged Out    HPV IMMUNIZATION  Aged Out    MENINGITIS IMMUNIZATION  Aged Out    RSV MONOCLONAL ANTIBODY  Aged Out    PAP  Discontinued         Review of Systems  Constitutional, HEENT, cardiovascular, pulmonary, gi and gu systems are negative, except as otherwise noted.     Objective    Exam  LMP 09/15/2003    Estimated body mass index is 27.46 kg/m  as calculated from the following:    Height as of 12/28/23: 1.6 m (5' 2.99\").    Weight as of 12/28/23: 70.3 kg (155 lb). Vital signs:  Temp: 97.9  F (36.6  C) Temp src: Tympanic BP: 112/64 Pulse: 72   " "Resp: 16 SpO2: 98 %     Height: 160 cm (5' 3\") Weight: 65.3 kg (144 lb)  Estimated body mass index is 25.51 kg/m  as calculated from the following:    Height as of this encounter: 1.6 m (5' 3\").    Weight as of this encounter: 65.3 kg (144 lb).          Physical Exam  GENERAL: alert and no distress  EYES: Eyes grossly normal to inspection, PERRL and conjunctivae and sclerae normal  HENT: ear canals and TM's normal, nose and mouth without ulcers or lesions  NECK: no adenopathy, no asymmetry, masses, or scars  RESP: lungs clear to auscultation - no rales, rhonchi or wheezes  CV: regular rate and rhythm, normal S1 S2, no S3 or S4, no murmur, click or rub, no peripheral edema  ABDOMEN: soft, nontender, no hepatosplenomegaly, no masses and bowel sounds normal retractable lump right inguinal area  MS: no gross musculoskeletal defects noted, no edema  SKIN: no suspicious lesions or rashes  NEURO: Normal strength and tone, mentation intact and speech normal  PSYCH: mentation appears normal, affect normal/bright        5/9/2024   Mini Cog   Clock Draw Score 2 Normal   3 Item Recall 3 objects recalled   Mini Cog Total Score 5              Signed Electronically by: SHYANNE Stallworth CNP    "

## 2024-06-27 ENCOUNTER — HOSPITAL ENCOUNTER (OUTPATIENT)
Dept: ULTRASOUND IMAGING | Facility: CLINIC | Age: 69
Discharge: HOME | End: 2024-06-27
Attending: NURSE PRACTITIONER
Payer: COMMERCIAL

## 2024-06-27 ENCOUNTER — HOSPITAL ENCOUNTER (OUTPATIENT)
Dept: BONE DENSITY | Facility: CLINIC | Age: 69
Discharge: HOME | End: 2024-06-27
Attending: NURSE PRACTITIONER
Payer: COMMERCIAL

## 2024-06-27 DIAGNOSIS — K40.90 NON-RECURRENT UNILATERAL INGUINAL HERNIA WITHOUT OBSTRUCTION OR GANGRENE: ICD-10-CM

## 2024-06-27 DIAGNOSIS — K40.90 NON-RECURRENT UNILATERAL INGUINAL HERNIA WITHOUT OBSTRUCTION OR GANGRENE: Primary | ICD-10-CM

## 2024-06-27 DIAGNOSIS — M81.0 AGE-RELATED OSTEOPOROSIS WITHOUT CURRENT PATHOLOGICAL FRACTURE: ICD-10-CM

## 2024-06-27 DIAGNOSIS — Z78.0 MENOPAUSE: ICD-10-CM

## 2024-06-27 PROCEDURE — 76705 ECHO EXAM OF ABDOMEN: CPT

## 2024-06-27 PROCEDURE — 77080 DXA BONE DENSITY AXIAL: CPT

## 2024-06-27 RX ORDER — ALENDRONATE SODIUM 70 MG/1
70 TABLET ORAL
Qty: 12 TABLET | Refills: 3 | Status: SHIPPED | OUTPATIENT
Start: 2024-06-27

## 2024-07-02 ENCOUNTER — OFFICE VISIT (OUTPATIENT)
Dept: SURGERY | Facility: CLINIC | Age: 69
End: 2024-07-02
Attending: NURSE PRACTITIONER
Payer: COMMERCIAL

## 2024-07-02 VITALS
OXYGEN SATURATION: 99 % | HEIGHT: 63 IN | DIASTOLIC BLOOD PRESSURE: 84 MMHG | WEIGHT: 149 LBS | BODY MASS INDEX: 26.4 KG/M2 | SYSTOLIC BLOOD PRESSURE: 153 MMHG | HEART RATE: 72 BPM

## 2024-07-02 DIAGNOSIS — K40.90 NON-RECURRENT UNILATERAL INGUINAL HERNIA WITHOUT OBSTRUCTION OR GANGRENE: ICD-10-CM

## 2024-07-02 DIAGNOSIS — K40.90 RIGHT INGUINAL HERNIA: Primary | ICD-10-CM

## 2024-07-02 PROCEDURE — 99213 OFFICE O/P EST LOW 20 MIN: CPT | Performed by: STUDENT IN AN ORGANIZED HEALTH CARE EDUCATION/TRAINING PROGRAM

## 2024-07-02 NOTE — PROGRESS NOTES
Surgical Consultation/History and Physical  [facility]    Levar is seen in consultation for right inguinal hernia, at the request of SHYANNE Stallworth CNP.    Chief Complaint:  right inguinal hernia    History of Present Illness: Levar Sesay is a 68 year old female who presents to surgery clinic for evaluation of a right inguinal hernia. First noticed symptoms about 4 months ago. Describes symptoms of right groin swelling, which have been gradually worsening. Denies any other bulges or symptoms at left groin. No associated nausea or emesis, has been tolerating a regular diet. Denies any episodes of bloating, obstipation, or inability to pass gas. No overlying skin changes.     Patient Active Problem List   Diagnosis    Postmenopausal atrophic vaginitis    MENOPAUSE MENOPAUSAL SYMPTOMS    Rosacea       Past Medical History:   Diagnosis Date    Abnormal glandular Papanicolaou smear of cervix 2003    ASCUS; negative HPV    ASCUS of cervix with negative high risk HPV 2018    plan to repeat Pap+HPV cotesting in 3 years ()       Past Surgical History:   Procedure Laterality Date    BIOPSY LYMPH NODE AXILLA Right 2022    Procedure: Right axillary excisional lymph node dissection;  Surgeon: Ruslan Bahena DO;  Location: [facility]    SURGICAL HISTORY OF -       Tonsillectomy    SURGICAL HISTORY OF -   1986        SURGICAL HISTORY OF -       D & C    SURGICAL HISTORY OF -   2005    carpal tunnel surgery       Family History   Problem Relation Age of Onset    Hypertension Mother     Cancer Mother         BCC    Arthritis Mother         unspecified    Eye Disorder Mother         glaucoma, cataract    Cerebrovascular Disease Father     Cancer Father         lung  - smoked and in navy    Respiratory Father     Cancer Maternal Grandmother         Uterine    Alcohol/Drug Maternal Grandfather     Cancer Paternal Grandmother         Uterine    Cancer  "Half-Sister         Lung; smoked    Arthritis Half-Sister     Thyroid Disease Other     Depression Sister        Social History     Tobacco Use    Smoking status: Never    Smokeless tobacco: Never   Substance Use Topics    Alcohol use: Yes     Comment: weekends        History   Drug Use No       Current Outpatient Medications   Medication Sig Dispense Refill    alendronate (FOSAMAX) 70 MG tablet Take 1 tablet (70 mg) by mouth every 7 days 12 tablet 3    calcium carbonate 750 MG CHEW Take 750 mg by mouth daily      fluticasone (FLONASE) 50 MCG/ACT nasal spray Spray 1 spray into both nostrils daily 16 g 3    MULTIPLE VITAMIN OR None Entered      oxymetazoline (NASAL SPRAY 12 HOUR) 0.05 % nasal spray Spray 2 sprays in nostril 2 times daily X 3 days 15 mL 0    valACYclovir (VALTREX) 500 MG tablet Take 1 tablet (500 mg) by mouth 2 times daily X 3 days at start of any cold sore outbreak. 24 tablet 2       Allergies   Allergen Reactions    Sulfa Antibiotics      Sulfa    Trace Minerals Cr-Cu-Mn-Zn Rash       Review of Systems:   10 point ROS negative other than what was listed in HPI    Physical Exam:  BP (!) 153/84   Pulse 72   Ht 1.6 m (5' 3\")   Wt 67.6 kg (149 lb)   LMP 09/15/2003   SpO2 99%   BMI 26.39 kg/m      Constitutional- No acute distress, well nourished, non-toxic  Eyes: Anicteric, no injection.  PERRL  ENT:  Normocephalic, atraumatic, Nose midline, moist mucus membranes  Neck - supple, no LAD  Respiratory- Nonlabored breathing on room air  Cardiovascular - Vital signs stable, extremities warm and well perfused  Abdomen - Soft, non-tender, no hepatosplenomegaly, no palpable masses  Groins - 2+ pulses bilaterally. Reducible swelling in right groin with valsalva, no appreciable hernia on left  Neuro - No focal neuro deficits, Alert and oriented x 3  Psych: Appropriate mood and affect  Musculoskeletal: Normal gait, symmetric strength.  FROM upper and lower extremities.  Skin: Warm, Dry    US hernia " 6/27/24  INDICATION: Palpable abnormality in the right inguinal region.  Evaluate for hernia.     COMPARISONS: None.     FINDINGS: In the right inguinal region there is a 9 mm hernia with  only fat extending into the hernia sac. No evidence for mass or cyst.                                                                      IMPRESSION: Fat-containing 9 mm right inguinal hernia.      Assessment:  1. Levar Sesay presents with an symptomatic right inguinal hernia. Symptoms are progressively worsening recently. The patient may benefit from hernia repair, and the indications, risks, benefits and alternatives to surgery were discussed in detail. Based on their history and clinical exam, I recommend proceeding with a robotic-assisted inguinal hernia repair on the right. The patient understood the counseling offered and wishes to proceed as planned and outlined. Risks specifically discussed include bleeding, infection, seroma, need for additional treatment, nontherapeutic intervention, recurrent hernia, potential need for mesh, potential need for temporary surgical drain, wound complication (such as dehiscence), and rare complications related to surgery and/or anesthesia such as venous thromboembolism and cardiorespiratory complications. We did also discuss the potential for discovering an asymptomatic inguinal hernia on the contralateral side. We mutually agreed on not repairing an asymptomatic hernia to reduce risk of causing surgical complications, such as mesh infection or iatrogenic injury, on an asymptomatic side.     In general, additionally we recommend avoiding long-term opioid medication for non-cancer pain due to well described risks. Today's visit included an extensive discussion on the risks of chronic opioid therapy, including tolerance, dependence, addiction, hyperalgesia, respiratory depression and death.     Plan:   1. Plan for elective robotic-assisted right inguinal hernia repair  2. Patient will  need pre-op H&P prior to procedure once scheduled        Lopez Waite MD  7/2/2024 at 1:23 PM

## 2024-07-02 NOTE — NURSING NOTE
Chief Complaint   Patient presents with    Consult     Inguinal Hernia // per patient // Refd by Palak Mckenzie CNP // no outside recs       There were no vitals filed for this visit.  Wt Readings from Last 1 Encounters:   05/09/24 65.3 kg (144 lb)       Farideh Hall MA

## 2024-07-02 NOTE — NURSING NOTE
"Chief Complaint   Patient presents with    Consult     Inguinal Hernia // per patient // Refd by Palak Mckenzie CNP // no outside recs       Vitals:    07/02/24 1312   Pulse: 72   SpO2: 99%   Weight: 67.6 kg (149 lb)   Height: 1.6 m (5' 3\")     Wt Readings from Last 1 Encounters:   07/02/24 67.6 kg (149 lb)       Farideh Hall MA      "

## 2024-07-02 NOTE — LETTER
2024      Levar Sesay  [id number] [address] [facility] [id number]      Dear Colleague,    Thank you for referring your patient, Levar Sesay, to the [facility]. Please see a copy of my visit note below.    Surgical Consultation/History and Physical  [facility]    Levar is seen in consultation for right inguinal hernia, at the request of SHYANNE Stallworth CNP.    Chief Complaint:  right inguinal hernia    History of Present Illness: Levar Sesay is a 68 year old female who presents to surgery clinic for evaluation of a right inguinal hernia. First noticed symptoms about 4 months ago. Describes symptoms of right groin swelling, which have been gradually worsening. Denies any other bulges or symptoms at left groin. No associated nausea or emesis, has been tolerating a regular diet. Denies any episodes of bloating, obstipation, or inability to pass gas. No overlying skin changes.     Patient Active Problem List   Diagnosis     Postmenopausal atrophic vaginitis     MENOPAUSE MENOPAUSAL SYMPTOMS     Rosacea       Past Medical History:   Diagnosis Date     Abnormal glandular Papanicolaou smear of cervix 2003    ASCUS; negative HPV     ASCUS of cervix with negative high risk HPV 2018    plan to repeat Pap+HPV cotesting in 3 years ()       Past Surgical History:   Procedure Laterality Date     BIOPSY LYMPH NODE AXILLA Right 2022    Procedure: Right axillary excisional lymph node dissection;  Surgeon: Ruslan Bahena DO;  Location: [facility]     SURGICAL HISTORY OF -       Tonsillectomy     SURGICAL HISTORY OF -   1986         SURGICAL HISTORY OF -       D & C     SURGICAL HISTORY OF -   2005    carpal tunnel surgery       Family History   Problem Relation Age of Onset     Hypertension Mother      Cancer Mother         BCC     Arthritis Mother         unspecified     Eye Disorder Mother         glaucoma, cataract  "    Cerebrovascular Disease Father      Cancer Father         lung  - smoked and in navy     Respiratory Father      Cancer Maternal Grandmother         Uterine     Alcohol/Drug Maternal Grandfather      Cancer Paternal Grandmother         Uterine     Cancer Half-Sister         Lung; smoked     Arthritis Half-Sister      Thyroid Disease Other      Depression Sister        Social History     Tobacco Use     Smoking status: Never     Smokeless tobacco: Never   Substance Use Topics     Alcohol use: Yes     Comment: weekends        History   Drug Use No       Current Outpatient Medications   Medication Sig Dispense Refill     alendronate (FOSAMAX) 70 MG tablet Take 1 tablet (70 mg) by mouth every 7 days 12 tablet 3     calcium carbonate 750 MG CHEW Take 750 mg by mouth daily       fluticasone (FLONASE) 50 MCG/ACT nasal spray Spray 1 spray into both nostrils daily 16 g 3     MULTIPLE VITAMIN OR None Entered       oxymetazoline (NASAL SPRAY 12 HOUR) 0.05 % nasal spray Spray 2 sprays in nostril 2 times daily X 3 days 15 mL 0     valACYclovir (VALTREX) 500 MG tablet Take 1 tablet (500 mg) by mouth 2 times daily X 3 days at start of any cold sore outbreak. 24 tablet 2       Allergies   Allergen Reactions     Sulfa Antibiotics      Sulfa     Trace Minerals Cr-Cu-Mn-Zn Rash       Review of Systems:   10 point ROS negative other than what was listed in HPI    Physical Exam:  BP (!) 153/84   Pulse 72   Ht 1.6 m (5' 3\")   Wt 67.6 kg (149 lb)   LMP 09/15/2003   SpO2 99%   BMI 26.39 kg/m      Constitutional- No acute distress, well nourished, non-toxic  Eyes: Anicteric, no injection.  PERRL  ENT:  Normocephalic, atraumatic, Nose midline, moist mucus membranes  Neck - supple, no LAD  Respiratory- Nonlabored breathing on room air  Cardiovascular - Vital signs stable, extremities warm and well perfused  Abdomen - Soft, non-tender, no hepatosplenomegaly, no palpable masses  Groins - 2+ pulses bilaterally. Reducible swelling in " right groin with valsalva, no appreciable hernia on left  Neuro - No focal neuro deficits, Alert and oriented x 3  Psych: Appropriate mood and affect  Musculoskeletal: Normal gait, symmetric strength.  FROM upper and lower extremities.  Skin: Warm, Dry    US hernia 6/27/24  INDICATION: Palpable abnormality in the right inguinal region.  Evaluate for hernia.     COMPARISONS: None.     FINDINGS: In the right inguinal region there is a 9 mm hernia with  only fat extending into the hernia sac. No evidence for mass or cyst.                                                                      IMPRESSION: Fat-containing 9 mm right inguinal hernia.      Assessment:  1. Levar Sesay presents with an symptomatic right inguinal hernia. Symptoms are progressively worsening recently. The patient may benefit from hernia repair, and the indications, risks, benefits and alternatives to surgery were discussed in detail. Based on their history and clinical exam, I recommend proceeding with a robotic-assisted inguinal hernia repair on the right. The patient understood the counseling offered and wishes to proceed as planned and outlined. Risks specifically discussed include bleeding, infection, seroma, need for additional treatment, nontherapeutic intervention, recurrent hernia, potential need for mesh, potential need for temporary surgical drain, wound complication (such as dehiscence), and rare complications related to surgery and/or anesthesia such as venous thromboembolism and cardiorespiratory complications. We did also discuss the potential for discovering an asymptomatic inguinal hernia on the contralateral side. We mutually agreed on not repairing an asymptomatic hernia to reduce risk of causing surgical complications, such as mesh infection or iatrogenic injury, on an asymptomatic side.     In general, additionally we recommend avoiding long-term opioid medication for non-cancer pain due to well described risks. Today's  visit included an extensive discussion on the risks of chronic opioid therapy, including tolerance, dependence, addiction, hyperalgesia, respiratory depression and death.     Plan:   1. Plan for elective robotic-assisted right inguinal hernia repair  2. Patient will need pre-op H&P prior to procedure once scheduled        Lopez Waite MD  7/2/2024 at 1:23 PM      Again, thank you for allowing me to participate in the care of your patient.        Sincerely,        Lopez Waite MD

## 2024-07-02 NOTE — PATIENT INSTRUCTIONS
Plan:   1. Plan for elective robotic-assisted right inguinal hernia repair  2. Patient will need pre-op H&P prior to procedure once scheduled

## 2024-07-03 ENCOUNTER — TELEPHONE (OUTPATIENT)
Dept: SURGERY | Facility: CLINIC | Age: 69
End: 2024-07-03
Payer: COMMERCIAL

## 2024-07-03 NOTE — TELEPHONE ENCOUNTER
Type of surgery: HERNIORRHAPHY, INGUINAL, ROBOT-ASSISTED, LAPAROSCOPIC, USING DA YODIT XI - right (Right)   Location of surgery: [facility]  Date and time of surgery: 07/17/2024  Surgeon: MALISSA  Pre-Op Appt Date: 07/16/2024  Post-Op Appt Date: 08/05/2024   Packet sent out: Yes  Pre-cert/Authorization completed:  No  Date:

## 2024-07-10 ENCOUNTER — ANESTHESIA EVENT (OUTPATIENT)
Dept: SURGERY | Facility: CLINIC | Age: 69
End: 2024-07-10
Payer: COMMERCIAL

## 2024-07-11 NOTE — ANESTHESIA PREPROCEDURE EVALUATION
Anesthesia Pre-Procedure Evaluation    Patient: Levar eSsay   MRN: [id number] : 1955        Procedure : Procedure(s):  HERNIORRHAPHY, INGUINAL, ROBOT-ASSISTED, LAPAROSCOPIC, USING DA YODIT XI - right          Past Medical History:   Diagnosis Date    Abnormal glandular Papanicolaou smear of cervix 2003    ASCUS; negative HPV    ASCUS of cervix with negative high risk HPV 2018    plan to repeat Pap+HPV cotesting in 3 years ()      Past Surgical History:   Procedure Laterality Date    BIOPSY LYMPH NODE AXILLA Right 2022    Procedure: Right axillary excisional lymph node dissection;  Surgeon: Ruslan Bahena DO;  Location: [facility]    SURGICAL HISTORY OF -       Tonsillectomy    SURGICAL HISTORY OF -   1986        SURGICAL HISTORY OF -       D & C    SURGICAL HISTORY OF -   2005    carpal tunnel surgery      Allergies   Allergen Reactions    Sulfa Antibiotics      Sulfa    Trace Minerals Cr-Cu-Mn-Zn Rash      Social History     Tobacco Use    Smoking status: Never    Smokeless tobacco: Never   Substance Use Topics    Alcohol use: Yes     Comment: weekends      Wt Readings from Last 1 Encounters:   24 67.6 kg (149 lb)        Anesthesia Evaluation   Pt has had prior anesthetic. Type: General and MAC.        ROS/MED HX  ENT/Pulmonary:       Neurologic:       Cardiovascular:       METS/Exercise Tolerance:     Hematologic:       Musculoskeletal:       GI/Hepatic:       Renal/Genitourinary:       Endo:       Psychiatric/Substance Use:       Infectious Disease:       Malignancy:       Other:            Physical Exam    Airway        Mallampati: III   TM distance: > 3 FB   Neck ROM: full   Mouth opening: > 3 cm    Respiratory Devices and Support  Comment: Not on oxygen currently       Dental  no notable dental history     (+) Minor Abnormalities - some fillings, tiny chips      Cardiovascular   cardiovascular exam normal          Pulmonary   pulmonary exam  "normal                OUTSIDE LABS:  CBC:   Lab Results   Component Value Date    WBC 5.0 05/09/2024    WBC 7.2 10/05/2022    HGB 14.7 05/09/2024    HGB 14.8 10/05/2022    HCT 44.9 05/09/2024    HCT 44.7 10/05/2022     05/09/2024     10/05/2022     BMP:   Lab Results   Component Value Date     05/09/2024     10/05/2022    POTASSIUM 4.6 05/09/2024    POTASSIUM 4.1 10/05/2022    CHLORIDE 103 05/09/2024    CHLORIDE 107 10/05/2022    CO2 30 (H) 05/09/2024    CO2 30 10/05/2022    BUN 7.9 (L) 05/09/2024    BUN 9 10/05/2022    CR 0.59 05/09/2024    CR 0.50 (L) 10/05/2022     (H) 05/09/2024    GLC 97 10/05/2022     COAGS: No results found for: \"PTT\", \"INR\", \"FIBR\"  POC: No results found for: \"BGM\", \"HCG\", \"HCGS\"  HEPATIC:   Lab Results   Component Value Date    ALBUMIN 4.3 05/09/2024    PROTTOTAL 7.5 05/09/2024    ALT 14 05/09/2024    AST 28 05/09/2024    ALKPHOS 73 05/09/2024    BILITOTAL 0.6 05/09/2024     OTHER:   Lab Results   Component Value Date    ABELINO 9.8 05/09/2024    TSH 3.94 05/09/2024    T4 0.93 11/26/2018    CRP <2.9 10/05/2022    SED 10 10/05/2022       Anesthesia Plan    ASA Status:  2       Anesthesia Type: General.   Induction: Intravenous, Propofol.   Maintenance: TIVA.        Consents    Anesthesia Plan(s) and associated risks, benefits, and realistic alternatives discussed. Questions answered and patient/representative(s) expressed understanding.     - Discussed: Risks, Benefits and Alternatives for BOTH SEDATION and the PROCEDURE were discussed     - Discussed with:  Patient            Postoperative Care    Pain management: IV analgesics, Oral pain medications, Multi-modal analgesia.   PONV prophylaxis: Ondansetron (or other 5HT-3), Dexamethasone or Solumedrol     Comments:    Other Comments: Patient aware of plan, what to expect, and potential risks. Timeout was performed before bringing the patient back to OR, and once again, patient was asked if they had any questions " "          Reji Jorgensen, SHYANNE CRNA    I have reviewed the pertinent notes and labs in the chart from the past 30 days and (re)examined the patient.  Any updates or changes from those notes are reflected in this note.              # Overweight: Estimated body mass index is 26.39 kg/m  as calculated from the following:    Height as of 7/2/24: 1.6 m (5' 3\").    Weight as of 7/2/24: 67.6 kg (149 lb).      "

## 2024-07-16 ENCOUNTER — OFFICE VISIT (OUTPATIENT)
Dept: FAMILY MEDICINE | Facility: CLINIC | Age: 69
End: 2024-07-16
Payer: COMMERCIAL

## 2024-07-16 VITALS
OXYGEN SATURATION: 98 % | RESPIRATION RATE: 18 BRPM | HEART RATE: 74 BPM | WEIGHT: 151 LBS | TEMPERATURE: 98.2 F | BODY MASS INDEX: 26.75 KG/M2 | DIASTOLIC BLOOD PRESSURE: 90 MMHG | HEIGHT: 63 IN | SYSTOLIC BLOOD PRESSURE: 160 MMHG

## 2024-07-16 DIAGNOSIS — K40.90 UNILATERAL INGUINAL HERNIA WITHOUT OBSTRUCTION OR GANGRENE, RECURRENCE NOT SPECIFIED: ICD-10-CM

## 2024-07-16 DIAGNOSIS — J30.2 SEASONAL ALLERGIC RHINITIS, UNSPECIFIED TRIGGER: ICD-10-CM

## 2024-07-16 DIAGNOSIS — R03.0 ELEVATED BLOOD PRESSURE READING WITHOUT DIAGNOSIS OF HYPERTENSION: ICD-10-CM

## 2024-07-16 DIAGNOSIS — Z01.818 PREOP GENERAL PHYSICAL EXAM: Primary | ICD-10-CM

## 2024-07-16 PROCEDURE — 99214 OFFICE O/P EST MOD 30 MIN: CPT | Performed by: FAMILY MEDICINE

## 2024-07-16 ASSESSMENT — PAIN SCALES - GENERAL: PAINLEVEL: NO PAIN (0)

## 2024-07-16 NOTE — H&P (VIEW-ONLY)
Preoperative Evaluation  [facility]  [address] [place] [address]  Phone: [phone]  Fax: [phone]  Primary Provider: SHYANNE Stallworth CNP  Pre-op Performing Provider: Thad Goodwin MD  Jul 16, 2024 7/16/2024   Surgical Information   What procedure is being done? HERNIORRHAPHY, INGUINAL, ROBOT-ASSISTED, LAPAROSCOPIC, USING DA YODIT XI - right    Facility or Hospital where procedure/surgery will be performed: [place]   Who is doing the procedure / surgery? [place]   Date of surgery / procedure: 7-   Time of surgery / procedure: 12:00pm   Where do you plan to recover after surgery? at home with family        Fax number for surgical facility: Note does not need to be faxed, will be available electronically in Epic.    Assessment & Plan     The proposed surgical procedure is considered LOW risk.      ICD-10-CM    1. Preop general physical exam  Z01.818       2. Unilateral inguinal hernia without obstruction or gangrene, recurrence not specified  K40.90       3. Seasonal allergic rhinitis, unspecified trigger  J30.2       4. Elevated blood pressure reading without diagnosis of hypertension  R03.0            - No identified additional risk factors other than previously addressed    Antiplatelet or Anticoagulation Medication Instructions   - Patient is on no antiplatelet or anticoagulation medications.    Additional Medication Instructions  Patient is not taking any regular meds     Recommendation  Approval given to proceed with proposed procedure, without further diagnostic evaluation.    Masoud Cristobal is a 68 year old, presenting for the following:  Pre-Op Exam  HPI related to upcoming procedure:   68-year-old female presents for a preop physical exam.  Patient is scheduled to have right inguinal hernia repair tomorrow.  She requires evaluation and anesthesia risk assessment prior to undergoing surgery/procedure.  Patient denies any  fever, chills, sore throat, cough, shortness of breath, chest pain, palpitation, diarrhea, constipation, abdominal pain, headache or other relevant systemic symptoms.           7/16/2024   Pre-Op Questionnaire   Have you ever had a heart attack or stroke? No   Have you ever had surgery on your heart or blood vessels, such as a stent placement, a coronary artery bypass, or surgery on an artery in your head, neck, heart, or legs? No   Do you have chest pain with activity? No   Do you have a history of heart failure? No   Do you currently have a cold, bronchitis or symptoms of other infection? No   Do you have a cough, shortness of breath, or wheezing? No   Do you or anyone in your family have previous history of blood clots? (!) YES - mother, half sister    Do you or does anyone in your family have a serious bleeding problem such as prolonged bleeding following surgeries or cuts? No   Have you ever had problems with anemia or been told to take iron pills? (!) YES - remote history    Have you had any abnormal blood loss such as black, tarry or bloody stools, or abnormal vaginal bleeding? No   Have you ever had a blood transfusion? No   Are you willing to have a blood transfusion if it is medically needed before, during, or after your surgery? Yes   Have you or any of your relatives ever had problems with anesthesia? No   Do you have sleep apnea, excessive snoring or daytime drowsiness? No   Do you have any artifical heart valves or other implanted medical devices like a pacemaker, defibrillator, or continuous glucose monitor? No   Do you have artificial joints? No   Are you allergic to latex? No        Preoperative Review of    reviewed - no record of controlled substances prescribed.      Status of Chronic Conditions:  See problem list for active medical problems.  Problems all longstanding and stable, except as noted/documented.  See ROS for pertinent symptoms related to these conditions.    Patient Active  Problem List    Diagnosis Date Noted    Postmenopausal atrophic vaginitis 2005     Priority: Medium    MENOPAUSE MENOPAUSAL SYMPTOMS 2005     Priority: Medium    Rosacea 2005     Priority: Medium      Past Medical History:   Diagnosis Date    Abnormal glandular Papanicolaou smear of cervix 2003    ASCUS; negative HPV    ASCUS of cervix with negative high risk HPV 2018    plan to repeat Pap+HPV cotesting in 3 years ()     Past Surgical History:   Procedure Laterality Date    BIOPSY LYMPH NODE AXILLA Right 2022    Procedure: Right axillary excisional lymph node dissection;  Surgeon: Ruslan Bahena DO;  Location: [facility]    SURGICAL HISTORY OF -       Tonsillectomy    SURGICAL HISTORY OF -   1986        SURGICAL HISTORY OF -       D & C    SURGICAL HISTORY OF -   2005    carpal tunnel surgery     Current Outpatient Medications   Medication Sig Dispense Refill    alendronate (FOSAMAX) 70 MG tablet Take 1 tablet (70 mg) by mouth every 7 days 12 tablet 3    calcium carbonate 750 MG CHEW Take 750 mg by mouth daily      fluticasone (FLONASE) 50 MCG/ACT nasal spray Spray 1 spray into both nostrils daily 16 g 3    MULTIPLE VITAMIN OR None Entered      oxymetazoline (NASAL SPRAY 12 HOUR) 0.05 % nasal spray Spray 2 sprays in nostril 2 times daily X 3 days 15 mL 0    valACYclovir (VALTREX) 500 MG tablet Take 1 tablet (500 mg) by mouth 2 times daily X 3 days at start of any cold sore outbreak. 24 tablet 2       Allergies   Allergen Reactions    Sulfa Antibiotics      Sulfa    Trace Minerals Cr-Cu-Mn-Zn Rash        Social History     Tobacco Use    Smoking status: Never     Passive exposure: Never    Smokeless tobacco: Never   Substance Use Topics    Alcohol use: Yes     Comment: weekends     Family History   Problem Relation Age of Onset    Hypertension Mother     Cancer Mother         BCC    Arthritis Mother         unspecified    Eye Disorder Mother          "glaucoma, cataract    Cerebrovascular Disease Father     Cancer Father         lung  - smoked and in navy    Respiratory Father     Cancer Maternal Grandmother         Uterine    Alcohol/Drug Maternal Grandfather     Cancer Paternal Grandmother         Uterine    Cancer Half-Sister         Lung; smoked    Arthritis Half-Sister     Thyroid Disease Other     Depression Sister      History   Drug Use No         Review of Systems  Constitutional, neuro, ENT, endocrine, pulmonary, cardiac, gastrointestinal, genitourinary, musculoskeletal, integument and psychiatric systems are negative, except as otherwise noted.    Objective    BP (!) 160/90 (Cuff Size: Adult Regular)   Pulse 74   Temp 98.2  F (36.8  C) (Tympanic)   Resp 18   Ht 1.6 m (5' 3\")   Wt 68.5 kg (151 lb)   LMP 09/15/2003   SpO2 98%   BMI 26.75 kg/m     Estimated body mass index is 26.75 kg/m  as calculated from the following:    Height as of this encounter: 1.6 m (5' 3\").    Weight as of this encounter: 68.5 kg (151 lb).  Physical Exam  GENERAL: alert and no distress  EYES: Eyes grossly normal to inspection, PERRL and conjunctivae and sclerae normal  HENT: normal cephalic/atraumatic, nose and mouth without ulcers or lesions, oropharynx clear, and oral mucous membranes moist  NECK: no adenopathy, no asymmetry, masses, or scars  RESP: lungs clear to auscultation - no rales, rhonchi or wheezes  CV: regular rate and rhythm, normal S1 S2, no S3 or S4, no murmur, click or rub, no peripheral edema  ABDOMEN: soft, nontender, no hepatosplenomegaly, no masses and bowel sounds normal  MS: no gross musculoskeletal defects noted, no edema  SKIN: no suspicious lesions or rashes  NEURO: Normal strength and tone, mentation intact and speech normal  PSYCH: mentation appears normal, affect normal/bright    Recent Labs   Lab Test 05/09/24  1121   HGB 14.7         POTASSIUM 4.6   CR 0.59      Wt Readings from Last 10 Encounters:   07/16/24 68.5 kg (151 lb) "   07/02/24 67.6 kg (149 lb)   05/09/24 65.3 kg (144 lb)   12/28/23 70.3 kg (155 lb)   12/07/22 68.1 kg (150 lb 3.2 oz)   10/05/22 66.8 kg (147 lb 3 oz)   09/27/22 67.1 kg (148 lb)   09/12/22 68 kg (150 lb)   09/09/22 68 kg (150 lb)   08/18/22 65.8 kg (145 lb 1 oz)      Diagnostics  No labs were ordered during this visit.   No EKG required, no history of coronary heart disease, significant arrhythmia, peripheral arterial disease or other structural heart disease.    Revised Cardiac Risk Index (RCRI)  The patient has the following serious cardiovascular risks for perioperative complications:   - No serious cardiac risks = 0 points     RCRI Interpretation: 0 points: Class I (very low risk - 0.4% complication rate)       Signed Electronically by: Thad Goodwin MD  Copy of this evaluation report is provided to requesting physician.

## 2024-07-16 NOTE — PROGRESS NOTES
Preoperative Evaluation  [facility]  [address] [place] [id number]  Phone: [phone]  Fax: [phone]  Primary Provider: SHYANNE Stallworth CNP  Pre-op Performing Provider: Thad Goodwin MD  Jul 16, 2024 7/16/2024   Surgical Information   What procedure is being done? HERNIORRHAPHY, INGUINAL, ROBOT-ASSISTED, LAPAROSCOPIC, USING DA YODIT XI - right    Facility or Hospital where procedure/surgery will be performed: [place]   Who is doing the procedure / surgery? [facility]   Date of surgery / procedure: 7-   Time of surgery / procedure: 12:00pm   Where do you plan to recover after surgery? at home with family        Fax number for surgical facility: Note does not need to be faxed, will be available electronically in Epic.    Assessment & Plan     The proposed surgical procedure is considered LOW risk.      ICD-10-CM    1. Preop general physical exam  Z01.818       2. Unilateral inguinal hernia without obstruction or gangrene, recurrence not specified  K40.90       3. Seasonal allergic rhinitis, unspecified trigger  J30.2       4. Elevated blood pressure reading without diagnosis of hypertension  R03.0            - No identified additional risk factors other than previously addressed    Antiplatelet or Anticoagulation Medication Instructions   - Patient is on no antiplatelet or anticoagulation medications.    Additional Medication Instructions  Patient is not taking any regular meds     Recommendation  Approval given to proceed with proposed procedure, without further diagnostic evaluation.    Masoud Cristobal is a 68 year old, presenting for the following:  Pre-Op Exam  HPI related to upcoming procedure:   68-year-old female presents for a preop physical exam.  Patient is scheduled to have right inguinal hernia repair tomorrow.  She requires evaluation and anesthesia risk assessment prior to undergoing surgery/procedure.  Patient denies any  fever, chills, sore throat, cough, shortness of breath, chest pain, palpitation, diarrhea, constipation, abdominal pain, headache or other relevant systemic symptoms.           7/16/2024   Pre-Op Questionnaire   Have you ever had a heart attack or stroke? No   Have you ever had surgery on your heart or blood vessels, such as a stent placement, a coronary artery bypass, or surgery on an artery in your head, neck, heart, or legs? No   Do you have chest pain with activity? No   Do you have a history of heart failure? No   Do you currently have a cold, bronchitis or symptoms of other infection? No   Do you have a cough, shortness of breath, or wheezing? No   Do you or anyone in your family have previous history of blood clots? (!) YES - mother, half sister    Do you or does anyone in your family have a serious bleeding problem such as prolonged bleeding following surgeries or cuts? No   Have you ever had problems with anemia or been told to take iron pills? (!) YES - remote history    Have you had any abnormal blood loss such as black, tarry or bloody stools, or abnormal vaginal bleeding? No   Have you ever had a blood transfusion? No   Are you willing to have a blood transfusion if it is medically needed before, during, or after your surgery? Yes   Have you or any of your relatives ever had problems with anesthesia? No   Do you have sleep apnea, excessive snoring or daytime drowsiness? No   Do you have any artifical heart valves or other implanted medical devices like a pacemaker, defibrillator, or continuous glucose monitor? No   Do you have artificial joints? No   Are you allergic to latex? No        Preoperative Review of    reviewed - no record of controlled substances prescribed.      Status of Chronic Conditions:  See problem list for active medical problems.  Problems all longstanding and stable, except as noted/documented.  See ROS for pertinent symptoms related to these conditions.    Patient Active  Problem List    Diagnosis Date Noted    Postmenopausal atrophic vaginitis 2005     Priority: Medium    MENOPAUSE MENOPAUSAL SYMPTOMS 2005     Priority: Medium    Rosacea 2005     Priority: Medium      Past Medical History:   Diagnosis Date    Abnormal glandular Papanicolaou smear of cervix 2003    ASCUS; negative HPV    ASCUS of cervix with negative high risk HPV 2018    plan to repeat Pap+HPV cotesting in 3 years ()     Past Surgical History:   Procedure Laterality Date    BIOPSY LYMPH NODE AXILLA Right 2022    Procedure: Right axillary excisional lymph node dissection;  Surgeon: Ruslan Bahena DO;  Location: [facility]    SURGICAL HISTORY OF -       Tonsillectomy    SURGICAL HISTORY OF -   1986        SURGICAL HISTORY OF -       D & C    SURGICAL HISTORY OF -   2005    carpal tunnel surgery     Current Outpatient Medications   Medication Sig Dispense Refill    alendronate (FOSAMAX) 70 MG tablet Take 1 tablet (70 mg) by mouth every 7 days 12 tablet 3    calcium carbonate 750 MG CHEW Take 750 mg by mouth daily      fluticasone (FLONASE) 50 MCG/ACT nasal spray Spray 1 spray into both nostrils daily 16 g 3    MULTIPLE VITAMIN OR None Entered      oxymetazoline (NASAL SPRAY 12 HOUR) 0.05 % nasal spray Spray 2 sprays in nostril 2 times daily X 3 days 15 mL 0    valACYclovir (VALTREX) 500 MG tablet Take 1 tablet (500 mg) by mouth 2 times daily X 3 days at start of any cold sore outbreak. 24 tablet 2       Allergies   Allergen Reactions    Sulfa Antibiotics      Sulfa    Trace Minerals Cr-Cu-Mn-Zn Rash        Social History     Tobacco Use    Smoking status: Never     Passive exposure: Never    Smokeless tobacco: Never   Substance Use Topics    Alcohol use: Yes     Comment: weekends     Family History   Problem Relation Age of Onset    Hypertension Mother     Cancer Mother         BCC    Arthritis Mother         unspecified    Eye Disorder Mother          "glaucoma, cataract    Cerebrovascular Disease Father     Cancer Father         lung  - smoked and in [employer]    Respiratory Father     Cancer Maternal Grandmother         Uterine    Alcohol/Drug Maternal Grandfather     Cancer Paternal Grandmother         Uterine    Cancer Half-Sister         Lung; smoked    Arthritis Half-Sister     Thyroid Disease Other     Depression Sister      History   Drug Use No         Review of Systems  Constitutional, neuro, ENT, endocrine, pulmonary, cardiac, gastrointestinal, genitourinary, musculoskeletal, integument and psychiatric systems are negative, except as otherwise noted.    Objective    BP (!) 160/90 (Cuff Size: Adult Regular)   Pulse 74   Temp 98.2  F (36.8  C) (Tympanic)   Resp 18   Ht 1.6 m (5' 3\")   Wt 68.5 kg (151 lb)   LMP 09/15/2003   SpO2 98%   BMI 26.75 kg/m     Estimated body mass index is 26.75 kg/m  as calculated from the following:    Height as of this encounter: 1.6 m (5' 3\").    Weight as of this encounter: 68.5 kg (151 lb).  Physical Exam  GENERAL: alert and no distress  EYES: Eyes grossly normal to inspection, PERRL and conjunctivae and sclerae normal  HENT: normal cephalic/atraumatic, nose and mouth without ulcers or lesions, oropharynx clear, and oral mucous membranes moist  NECK: no adenopathy, no asymmetry, masses, or scars  RESP: lungs clear to auscultation - no rales, rhonchi or wheezes  CV: regular rate and rhythm, normal S1 S2, no S3 or S4, no murmur, click or rub, no peripheral edema  ABDOMEN: soft, nontender, no hepatosplenomegaly, no masses and bowel sounds normal  MS: no gross musculoskeletal defects noted, no edema  SKIN: no suspicious lesions or rashes  NEURO: Normal strength and tone, mentation intact and speech normal  PSYCH: mentation appears normal, affect normal/bright    Recent Labs   Lab Test 05/09/24  1121   HGB 14.7         POTASSIUM 4.6   CR 0.59      Wt Readings from Last 10 Encounters:   07/16/24 68.5 kg (151 lb) "   07/02/24 67.6 kg (149 lb)   05/09/24 65.3 kg (144 lb)   12/28/23 70.3 kg (155 lb)   12/07/22 68.1 kg (150 lb 3.2 oz)   10/05/22 66.8 kg (147 lb 3 oz)   09/27/22 67.1 kg (148 lb)   09/12/22 68 kg (150 lb)   09/09/22 68 kg (150 lb)   08/18/22 65.8 kg (145 lb 1 oz)      Diagnostics  No labs were ordered during this visit.   No EKG required, no history of coronary heart disease, significant arrhythmia, peripheral arterial disease or other structural heart disease.    Revised Cardiac Risk Index (RCRI)  The patient has the following serious cardiovascular risks for perioperative complications:   - No serious cardiac risks = 0 points     RCRI Interpretation: 0 points: Class I (very low risk - 0.4% complication rate)       Signed Electronically by: Thad Goodwin MD  Copy of this evaluation report is provided to requesting physician.

## 2024-07-16 NOTE — NURSING NOTE
"Chief Complaint   Patient presents with    Pre-Op Exam     BP (!) 160/90 (Cuff Size: Adult Regular)   Pulse 74   Temp 98.2  F (36.8  C) (Tympanic)   Resp 18   Ht 1.6 m (5' 3\")   Wt 68.5 kg (151 lb)   LMP 09/15/2003   SpO2 98%   BMI 26.75 kg/m   Estimated body mass index is 26.75 kg/m  as calculated from the following:    Height as of this encounter: 1.6 m (5' 3\").    Weight as of this encounter: 68.5 kg (151 lb).  Patient presents to the clinic using No DME      Health Maintenance that is potentially due pending provider review:    Health Maintenance Due   Topic Date Due    ZOSTER IMMUNIZATION (1 of 2) Never done    DTAP/TDAP/TD IMMUNIZATION (1 - Tdap) 01/02/2013    RSV VACCINE (Pregnancy & 60+) (1 - 1-dose 60+ series) Never done    Pneumococcal Vaccine: 65+ Years (1 of 1 - PCV) Never done    COVID-19 Vaccine (4 - 2023-24 season) 09/01/2023                  "

## 2024-07-17 ENCOUNTER — ANESTHESIA (OUTPATIENT)
Dept: SURGERY | Facility: CLINIC | Age: 69
End: 2024-07-17
Payer: COMMERCIAL

## 2024-07-17 ENCOUNTER — HOSPITAL ENCOUNTER (OUTPATIENT)
Facility: CLINIC | Age: 69
Discharge: HOME | End: 2024-07-17
Attending: STUDENT IN AN ORGANIZED HEALTH CARE EDUCATION/TRAINING PROGRAM | Admitting: STUDENT IN AN ORGANIZED HEALTH CARE EDUCATION/TRAINING PROGRAM
Payer: COMMERCIAL

## 2024-07-17 VITALS
SYSTOLIC BLOOD PRESSURE: 123 MMHG | WEIGHT: 151 LBS | TEMPERATURE: 98.6 F | RESPIRATION RATE: 16 BRPM | OXYGEN SATURATION: 100 % | BODY MASS INDEX: 26.75 KG/M2 | HEART RATE: 79 BPM | DIASTOLIC BLOOD PRESSURE: 64 MMHG | HEIGHT: 63 IN

## 2024-07-17 DIAGNOSIS — K40.90 RIGHT INGUINAL HERNIA: Primary | ICD-10-CM

## 2024-07-17 PROCEDURE — 999N000141 HC STATISTIC PRE-PROCEDURE NURSING ASSESSMENT: Performed by: STUDENT IN AN ORGANIZED HEALTH CARE EDUCATION/TRAINING PROGRAM

## 2024-07-17 PROCEDURE — 271N000001 HC OR GENERAL SUPPLY NON-STERILE: Performed by: STUDENT IN AN ORGANIZED HEALTH CARE EDUCATION/TRAINING PROGRAM

## 2024-07-17 PROCEDURE — 49650 LAP ING HERNIA REPAIR INIT: CPT | Mod: RT | Performed by: STUDENT IN AN ORGANIZED HEALTH CARE EDUCATION/TRAINING PROGRAM

## 2024-07-17 PROCEDURE — C1781 MESH (IMPLANTABLE): HCPCS | Performed by: STUDENT IN AN ORGANIZED HEALTH CARE EDUCATION/TRAINING PROGRAM

## 2024-07-17 PROCEDURE — 370N000017 HC ANESTHESIA TECHNICAL FEE, PER MIN: Performed by: STUDENT IN AN ORGANIZED HEALTH CARE EDUCATION/TRAINING PROGRAM

## 2024-07-17 PROCEDURE — 250N000013 HC RX MED GY IP 250 OP 250 PS 637

## 2024-07-17 PROCEDURE — 710N000012 HC RECOVERY PHASE 2, PER MINUTE: Performed by: STUDENT IN AN ORGANIZED HEALTH CARE EDUCATION/TRAINING PROGRAM

## 2024-07-17 PROCEDURE — 250N000009 HC RX 250: Performed by: STUDENT IN AN ORGANIZED HEALTH CARE EDUCATION/TRAINING PROGRAM

## 2024-07-17 PROCEDURE — 250N000009 HC RX 250

## 2024-07-17 PROCEDURE — 250N000013 HC RX MED GY IP 250 OP 250 PS 637: Performed by: STUDENT IN AN ORGANIZED HEALTH CARE EDUCATION/TRAINING PROGRAM

## 2024-07-17 PROCEDURE — 250N000011 HC RX IP 250 OP 636

## 2024-07-17 PROCEDURE — 710N000009 HC RECOVERY PHASE 1, LEVEL 1, PER MIN: Performed by: STUDENT IN AN ORGANIZED HEALTH CARE EDUCATION/TRAINING PROGRAM

## 2024-07-17 PROCEDURE — 360N000080 HC SURGERY LEVEL 7, PER MIN: Performed by: STUDENT IN AN ORGANIZED HEALTH CARE EDUCATION/TRAINING PROGRAM

## 2024-07-17 PROCEDURE — 258N000003 HC RX IP 258 OP 636

## 2024-07-17 PROCEDURE — 250N000011 HC RX IP 250 OP 636: Performed by: STUDENT IN AN ORGANIZED HEALTH CARE EDUCATION/TRAINING PROGRAM

## 2024-07-17 PROCEDURE — 272N000001 HC OR GENERAL SUPPLY STERILE: Performed by: STUDENT IN AN ORGANIZED HEALTH CARE EDUCATION/TRAINING PROGRAM

## 2024-07-17 PROCEDURE — S2900 ROBOTIC SURGICAL SYSTEM: HCPCS | Performed by: STUDENT IN AN ORGANIZED HEALTH CARE EDUCATION/TRAINING PROGRAM

## 2024-07-17 DEVICE — ANATOMICAL MESH PRE-SHAPED MONOFILAMENT POLYPROPYLENE TEXTILE WITH MARKING;RIGHT SIDE
Type: IMPLANTABLE DEVICE | Site: ABDOMEN | Status: FUNCTIONAL
Brand: DEXTILE

## 2024-07-17 RX ORDER — ACETAMINOPHEN 325 MG/1
650 TABLET ORAL EVERY 4 HOURS PRN
Qty: 50 TABLET | Refills: 0 | Status: SHIPPED | OUTPATIENT
Start: 2024-07-17

## 2024-07-17 RX ORDER — CEFAZOLIN SODIUM/WATER 2 G/20 ML
2 SYRINGE (ML) INTRAVENOUS SEE ADMIN INSTRUCTIONS
Status: DISCONTINUED | OUTPATIENT
Start: 2024-07-17 | End: 2024-07-17 | Stop reason: HOSPADM

## 2024-07-17 RX ORDER — KETOROLAC TROMETHAMINE 30 MG/ML
INJECTION, SOLUTION INTRAMUSCULAR; INTRAVENOUS PRN
Status: DISCONTINUED | OUTPATIENT
Start: 2024-07-17 | End: 2024-07-17

## 2024-07-17 RX ORDER — LIDOCAINE 40 MG/G
CREAM TOPICAL
Status: DISCONTINUED | OUTPATIENT
Start: 2024-07-17 | End: 2024-07-17 | Stop reason: HOSPADM

## 2024-07-17 RX ORDER — METHOCARBAMOL 500 MG/1
500 TABLET, FILM COATED ORAL
Status: COMPLETED | OUTPATIENT
Start: 2024-07-17 | End: 2024-07-17

## 2024-07-17 RX ORDER — FENTANYL CITRATE 50 UG/ML
INJECTION, SOLUTION INTRAMUSCULAR; INTRAVENOUS PRN
Status: DISCONTINUED | OUTPATIENT
Start: 2024-07-17 | End: 2024-07-17

## 2024-07-17 RX ORDER — HYDROMORPHONE HCL IN WATER/PF 6 MG/30 ML
0.2 PATIENT CONTROLLED ANALGESIA SYRINGE INTRAVENOUS EVERY 5 MIN PRN
Status: DISCONTINUED | OUTPATIENT
Start: 2024-07-17 | End: 2024-07-17 | Stop reason: HOSPADM

## 2024-07-17 RX ORDER — OXYCODONE HYDROCHLORIDE 5 MG/1
5-10 TABLET ORAL EVERY 4 HOURS PRN
Qty: 10 TABLET | Refills: 0 | Status: SHIPPED | OUTPATIENT
Start: 2024-07-17

## 2024-07-17 RX ORDER — NALOXONE HYDROCHLORIDE 0.4 MG/ML
0.1 INJECTION, SOLUTION INTRAMUSCULAR; INTRAVENOUS; SUBCUTANEOUS
Status: DISCONTINUED | OUTPATIENT
Start: 2024-07-17 | End: 2024-07-17 | Stop reason: HOSPADM

## 2024-07-17 RX ORDER — DEXAMETHASONE SODIUM PHOSPHATE 4 MG/ML
4 INJECTION, SOLUTION INTRA-ARTICULAR; INTRALESIONAL; INTRAMUSCULAR; INTRAVENOUS; SOFT TISSUE
Status: DISCONTINUED | OUTPATIENT
Start: 2024-07-17 | End: 2024-07-17 | Stop reason: HOSPADM

## 2024-07-17 RX ORDER — SODIUM CHLORIDE, SODIUM LACTATE, POTASSIUM CHLORIDE, CALCIUM CHLORIDE 600; 310; 30; 20 MG/100ML; MG/100ML; MG/100ML; MG/100ML
INJECTION, SOLUTION INTRAVENOUS CONTINUOUS
Status: DISCONTINUED | OUTPATIENT
Start: 2024-07-17 | End: 2024-07-17 | Stop reason: HOSPADM

## 2024-07-17 RX ORDER — ONDANSETRON 2 MG/ML
INJECTION INTRAMUSCULAR; INTRAVENOUS PRN
Status: DISCONTINUED | OUTPATIENT
Start: 2024-07-17 | End: 2024-07-17

## 2024-07-17 RX ORDER — ONDANSETRON 4 MG/1
4 TABLET, ORALLY DISINTEGRATING ORAL EVERY 30 MIN PRN
Status: DISCONTINUED | OUTPATIENT
Start: 2024-07-17 | End: 2024-07-17 | Stop reason: HOSPADM

## 2024-07-17 RX ORDER — FENTANYL CITRATE 50 UG/ML
50 INJECTION, SOLUTION INTRAMUSCULAR; INTRAVENOUS EVERY 5 MIN PRN
Status: DISCONTINUED | OUTPATIENT
Start: 2024-07-17 | End: 2024-07-17 | Stop reason: HOSPADM

## 2024-07-17 RX ORDER — AMOXICILLIN 250 MG
1-2 CAPSULE ORAL 2 TIMES DAILY
Qty: 30 TABLET | Refills: 0 | Status: SHIPPED | OUTPATIENT
Start: 2024-07-17

## 2024-07-17 RX ORDER — BUPIVACAINE HYDROCHLORIDE 5 MG/ML
INJECTION, SOLUTION PERINEURAL PRN
Status: DISCONTINUED | OUTPATIENT
Start: 2024-07-17 | End: 2024-07-17 | Stop reason: HOSPADM

## 2024-07-17 RX ORDER — ACETAMINOPHEN 325 MG/1
975 TABLET ORAL ONCE
Status: COMPLETED | OUTPATIENT
Start: 2024-07-17 | End: 2024-07-17

## 2024-07-17 RX ORDER — CEFAZOLIN SODIUM/WATER 2 G/20 ML
2 SYRINGE (ML) INTRAVENOUS
Status: DISCONTINUED | OUTPATIENT
Start: 2024-07-17 | End: 2024-07-17 | Stop reason: HOSPADM

## 2024-07-17 RX ORDER — ONDANSETRON 2 MG/ML
4 INJECTION INTRAMUSCULAR; INTRAVENOUS EVERY 30 MIN PRN
Status: DISCONTINUED | OUTPATIENT
Start: 2024-07-17 | End: 2024-07-17 | Stop reason: HOSPADM

## 2024-07-17 RX ORDER — LIDOCAINE HYDROCHLORIDE 20 MG/ML
INJECTION, SOLUTION INFILTRATION; PERINEURAL PRN
Status: DISCONTINUED | OUTPATIENT
Start: 2024-07-17 | End: 2024-07-17

## 2024-07-17 RX ORDER — OXYCODONE HYDROCHLORIDE 5 MG/1
5 TABLET ORAL
Status: COMPLETED | OUTPATIENT
Start: 2024-07-17 | End: 2024-07-17

## 2024-07-17 RX ORDER — PROPOFOL 10 MG/ML
INJECTION, EMULSION INTRAVENOUS CONTINUOUS PRN
Status: DISCONTINUED | OUTPATIENT
Start: 2024-07-17 | End: 2024-07-17

## 2024-07-17 RX ORDER — FENTANYL CITRATE 50 UG/ML
25 INJECTION, SOLUTION INTRAMUSCULAR; INTRAVENOUS EVERY 5 MIN PRN
Status: DISCONTINUED | OUTPATIENT
Start: 2024-07-17 | End: 2024-07-17 | Stop reason: HOSPADM

## 2024-07-17 RX ORDER — SODIUM CHLORIDE, SODIUM LACTATE, POTASSIUM CHLORIDE, CALCIUM CHLORIDE 600; 310; 30; 20 MG/100ML; MG/100ML; MG/100ML; MG/100ML
INJECTION, SOLUTION INTRAVENOUS CONTINUOUS PRN
Status: DISCONTINUED | OUTPATIENT
Start: 2024-07-17 | End: 2024-07-17

## 2024-07-17 RX ORDER — HEPARIN SODIUM 5000 [USP'U]/.5ML
5000 INJECTION, SOLUTION INTRAVENOUS; SUBCUTANEOUS
Status: COMPLETED | OUTPATIENT
Start: 2024-07-17 | End: 2024-07-17

## 2024-07-17 RX ORDER — DEXAMETHASONE SODIUM PHOSPHATE 4 MG/ML
INJECTION, SOLUTION INTRA-ARTICULAR; INTRALESIONAL; INTRAMUSCULAR; INTRAVENOUS; SOFT TISSUE PRN
Status: DISCONTINUED | OUTPATIENT
Start: 2024-07-17 | End: 2024-07-17

## 2024-07-17 RX ORDER — LIDOCAINE HYDROCHLORIDE AND EPINEPHRINE 10; 10 MG/ML; UG/ML
INJECTION, SOLUTION INFILTRATION; PERINEURAL PRN
Status: DISCONTINUED | OUTPATIENT
Start: 2024-07-17 | End: 2024-07-17 | Stop reason: HOSPADM

## 2024-07-17 RX ORDER — ACETAMINOPHEN 325 MG/1
650 TABLET ORAL
Status: DISCONTINUED | OUTPATIENT
Start: 2024-07-17 | End: 2024-07-17 | Stop reason: HOSPADM

## 2024-07-17 RX ORDER — HYDROMORPHONE HCL IN WATER/PF 6 MG/30 ML
0.4 PATIENT CONTROLLED ANALGESIA SYRINGE INTRAVENOUS EVERY 5 MIN PRN
Status: DISCONTINUED | OUTPATIENT
Start: 2024-07-17 | End: 2024-07-17 | Stop reason: HOSPADM

## 2024-07-17 RX ORDER — PROPOFOL 10 MG/ML
INJECTION, EMULSION INTRAVENOUS PRN
Status: DISCONTINUED | OUTPATIENT
Start: 2024-07-17 | End: 2024-07-17

## 2024-07-17 RX ADMIN — LIDOCAINE HYDROCHLORIDE 0.1 ML: 10 INJECTION, SOLUTION EPIDURAL; INFILTRATION; INTRACAUDAL; PERINEURAL at 06:38

## 2024-07-17 RX ADMIN — METHOCARBAMOL 500 MG: 500 TABLET ORAL at 09:45

## 2024-07-17 RX ADMIN — PHENYLEPHRINE HYDROCHLORIDE 100 MCG: 10 INJECTION INTRAVENOUS at 08:20

## 2024-07-17 RX ADMIN — FENTANYL CITRATE 50 MCG: 50 INJECTION INTRAMUSCULAR; INTRAVENOUS at 07:37

## 2024-07-17 RX ADMIN — ROCURONIUM BROMIDE 50 MG: 50 INJECTION, SOLUTION INTRAVENOUS at 07:37

## 2024-07-17 RX ADMIN — ONDANSETRON 4 MG: 2 INJECTION INTRAMUSCULAR; INTRAVENOUS at 09:15

## 2024-07-17 RX ADMIN — Medication 2 G: at 07:30

## 2024-07-17 RX ADMIN — PROPOFOL 150 MCG/KG/MIN: 10 INJECTION, EMULSION INTRAVENOUS at 07:37

## 2024-07-17 RX ADMIN — LIDOCAINE HYDROCHLORIDE 100 MG: 20 INJECTION, SOLUTION INFILTRATION; PERINEURAL at 07:37

## 2024-07-17 RX ADMIN — HYDROMORPHONE HYDROCHLORIDE 0.5 MG: 1 INJECTION, SOLUTION INTRAMUSCULAR; INTRAVENOUS; SUBCUTANEOUS at 08:20

## 2024-07-17 RX ADMIN — OXYCODONE HYDROCHLORIDE 5 MG: 5 TABLET ORAL at 10:50

## 2024-07-17 RX ADMIN — PHENYLEPHRINE HYDROCHLORIDE 0.5 MCG/KG/MIN: 10 INJECTION INTRAVENOUS at 08:23

## 2024-07-17 RX ADMIN — SODIUM CHLORIDE, POTASSIUM CHLORIDE, SODIUM LACTATE AND CALCIUM CHLORIDE: 600; 310; 30; 20 INJECTION, SOLUTION INTRAVENOUS at 07:30

## 2024-07-17 RX ADMIN — ROCURONIUM BROMIDE 10 MG: 50 INJECTION, SOLUTION INTRAVENOUS at 09:08

## 2024-07-17 RX ADMIN — FENTANYL CITRATE 50 MCG: 50 INJECTION INTRAMUSCULAR; INTRAVENOUS at 07:56

## 2024-07-17 RX ADMIN — SODIUM CHLORIDE, POTASSIUM CHLORIDE, SODIUM LACTATE AND CALCIUM CHLORIDE: 600; 310; 30; 20 INJECTION, SOLUTION INTRAVENOUS at 06:38

## 2024-07-17 RX ADMIN — PHENYLEPHRINE HYDROCHLORIDE 100 MCG: 10 INJECTION INTRAVENOUS at 08:10

## 2024-07-17 RX ADMIN — MIDAZOLAM 2 MG: 1 INJECTION INTRAMUSCULAR; INTRAVENOUS at 07:30

## 2024-07-17 RX ADMIN — SUGAMMADEX 200 MG: 100 INJECTION, SOLUTION INTRAVENOUS at 09:18

## 2024-07-17 RX ADMIN — PHENYLEPHRINE HYDROCHLORIDE 100 MCG: 10 INJECTION INTRAVENOUS at 07:52

## 2024-07-17 RX ADMIN — DEXAMETHASONE SODIUM PHOSPHATE 4 MG: 4 INJECTION, SOLUTION INTRA-ARTICULAR; INTRALESIONAL; INTRAMUSCULAR; INTRAVENOUS; SOFT TISSUE at 07:49

## 2024-07-17 RX ADMIN — KETOROLAC TROMETHAMINE 15 MG: 30 INJECTION, SOLUTION INTRAMUSCULAR at 09:15

## 2024-07-17 RX ADMIN — ACETAMINOPHEN 975 MG: 325 TABLET, FILM COATED ORAL at 06:27

## 2024-07-17 RX ADMIN — PROPOFOL 200 MG: 10 INJECTION, EMULSION INTRAVENOUS at 07:37

## 2024-07-17 RX ADMIN — HEPARIN SODIUM 5000 UNITS: 10000 INJECTION, SOLUTION INTRAVENOUS; SUBCUTANEOUS at 07:51

## 2024-07-17 ASSESSMENT — ACTIVITIES OF DAILY LIVING (ADL)
ADLS_ACUITY_SCORE: 20
ADLS_ACUITY_SCORE: 20
ADLS_ACUITY_SCORE: 21
ADLS_ACUITY_SCORE: 20

## 2024-07-17 NOTE — DISCHARGE INSTRUCTIONS
Same Day Surgery Discharge Instructions  Special Precautions After Surgery - Adult    It is not unusual to feel lightheaded or faint, up to 24 hours after surgery or while taking pain medication.  If you have these symptoms; sit for a few minutes before standing and have someone assist you when getting up.  You should rest and relax for the next 24 hours and must have someone stay with you for at least 24 hours after your discharge.  DO NOT DRIVE any vehicle or operate mechanical equipment for 24 hours following the end of your surgery.  DO NOT DRIVE while taking narcotic pain medications that have been prescribed by your physician.  If you had a limb operated on, you must be able to use it fully to drive.  DO NOT drink alcoholic beverages for 24 hours following surgery or while taking prescription pain medication.  Drink clear liquids (apple juice, ginger ale, broth, 7-Up, etc.).  Progress to your regular diet as you feel able.  Any questions call your physician and do not make important decisions for 24 hours.      __________________________________________________________________________________________________________________________________  IMPORTANT NUMBERS:    [facility] Main Number:  [phone], [phone]  Pharmacy:  [phone]  Same Day Surgery:  [phone], Monday - Friday until 8:30 p.m.  Urgent Care:  [phone]  Emergency Room:  [phone]      [facility]:  [phone]                                                                             [facility]:  [phone] option 1  [facility]:  [phone]     [facility]:  [phone]   Surgery Specialty Clinic:  [phone]   Home Medical Equipment: [phone]  [facility]:  [phone]

## 2024-07-17 NOTE — ANESTHESIA POSTPROCEDURE EVALUATION
Patient: Levar Sesay    Procedure: Procedure(s):  HERNIORRHAPHY, INGUINAL, ROBOT-ASSISTED, LAPAROSCOPIC, USING DA YODIT XI - right       Anesthesia Type:  General    Note:  Disposition: Outpatient   Postop Pain Control: Uneventful            Sign Out: Well controlled pain   PONV: No   Neuro/Psych: Uneventful            Sign Out: Acceptable/Baseline neuro status   Airway/Respiratory: Uneventful            Sign Out: Acceptable/Baseline resp. status   CV/Hemodynamics: Uneventful            Sign Out: Acceptable CV status; No obvious hypovolemia; No obvious fluid overload   Other NRE:    DID A NON-ROUTINE EVENT OCCUR?            Last vitals:  Vitals Value Taken Time   /79 07/17/24 1000   Temp 35.8  C (96.44  F) 07/17/24 1014   Pulse 79 07/17/24 1014   Resp 21 07/17/24 1014   SpO2 94 % 07/17/24 1014   Vitals shown include unfiled device data.    Electronically Signed By: SHYANNE Neal CRNA  July 17, 2024  12:04 PM

## 2024-07-17 NOTE — INTERVAL H&P NOTE
"I have reviewed the surgical (or preoperative) H&P that is linked to this encounter, and examined the patient. There are no significant changes    Clinical Conditions Present on Arrival:  Clinically Significant Risk Factors Present on Admission                      # Overweight: Estimated body mass index is 26.75 kg/m  as calculated from the following:    Height as of this encounter: 1.6 m (5' 3\").    Weight as of this encounter: 68.5 kg (151 lb).       "

## 2024-07-17 NOTE — OR NURSING
[facility] TRANSPORT NOTE  Data:   Reason for Transport:  PACU discharge criteria met    Levar MILLS Lázaro was transported to [facility] via cart at 1018.  Patient was accompanied by Registered Nurse. Equipment used for transport: None. Family was aware of reason for transport: yes    Action:  Report: given to Sharon DOMINGUEZ RN    Response:  Patient's condition when transferred off unit was stable.    Marj Reed RN

## 2024-07-17 NOTE — OP NOTE
OPERATIVE REPORT - STAFF        Levar Sesay   : 1955   Sex: female   MRN: [id number]  2024 9:20 AM         Procedures Performed  Robotic-assisted transabdominal preperitoneal right inguinal hernia repair     Indications: Levar Sesay was seen in surgery clinic for evaluation of a symptomatic right-sided inguinal hernia. After a discussion with the patient regarding therapeutic options, risks, and benefits, a robotic-assisted inguinal hernia repair was recommended. Patient was amenable to the proposed plan    Pre-operative Diagnosis: right inguinal hernia, indirect, reducible    Post-operative Diagnosis: same    Surgeon(s):  Lopez Waite MD  Assistant:  Cheyenne Herndon PA-C - Expert assistance was necessary for exposure  and retraction during the procedure    Anesthesia: General    Procedure Details  The patient was seen in the Holding Room. The risks, benefits, indications, potential complications, treatment options, and expected outcomes were discussed with the patient. The possibilities of reaction to medication, bleeding, infection, the need for additional procedures, failure to diagnose a condition, and creating a complication requiring transfusion or further operation were discussed with the patient. The patient concurred with the proposed plan, giving informed consent.  The site of surgery was properly noted/marked. The patient was taken to the operating room, identified as Levar Sesay and the procedure verified as robotic-assisted right inguinal hernia repair. A Time Out was held and the above information confirmed.    General anesthesia was induced without complication, and both arms were tucked at the sides on the operating table. The abdomen was prepped and draped in the usual sterile fashion.  A time-out was held prior to the initiation of the procedure.     An incision was made in the LUQ and a 5-mm optical entry trocar was placed under direct visualization to enter  the abdomen. Following the smooth establishment of pneumoperitoneum, the abdomen was surveyed and no bleeding or injury was identified. The lower abdomen was surveyed, and we noted the already known right inguinal hernia. On the left, no inguinal hernia was noted.  Two more 8 mm ports were placed under direct visualization - one in the right upper quadrant and another just right of midline in the upper abdomen. The 5-mm port was upsized to an 8 mm robotic port. A right-sided Bard 3DMax mesh was placed intraabdominally through the left upper qudrant port, along with 3-0 Vicryl suture for mesh fixation and 3-0 PDS V-lock suture for eventual closure of the peritoneal defect.  The robot was then docked, and we converted to the robotic-assisted portion of the procedure.     We began our dissection on the right side.  A flap of peritoneum was raised beginning superiorly to the internal ring, taking care to preserve the inferior epigastric vessels which remained anterior.  The preperitoneal space was then developed bluntly from several centimeters laterally, down to the pubis.  Jerry's ligament was identified and cleared off.  The hernia sac was noted to be protruding into the inguinal ring. This was bluntly dissected free from its attachments to the cord structures, taking care to avoid injury to the latter.  Once the hernia sac was completely reduced, the right-sided piece of mesh was placed into the preperitoneal space which had been adequately developed.  The mesh was then sutured to Jerry ligament medially as well as in two spaces laterally above the iliopubic tract to avoid nerve injury.  This was done using a 3-0 Vicryl suture. The peritoneal defect deep to the mesh was then sutured closed in a running fashion using the V-Loc PDS suture.     We once again surveyed the field and were satisfied with our peritoneal coverage of the mesh as well as our hemostasis. Insufflation was evacuated from the preperitoneum and  pneumoscrotum using an 14 gauge angiocath needle placed in the low midline with care taken to avoid injuring bladder or any vascular structures. Needles were retrieved.  We did palpate and confirm that both testes were in the scrotum.     The robot was undocked and backed away from the field.  Pneumoperitoneum was evacuated and ports were all removed. The skin at all port sites was closed with 4-0 Monocryl suture.  Dermabond was applied.  All instrument, needle, and sponge counts were correct prior to completion of the case.    The patient was then woken, extubated, transferred to a transport gurney, and brought to the Postanesthesia Care Unit in satisfactory condition.  Sponge and needle counts were correct on two occasions prior to the completion of the case.      Findings: right inguinal hernia, indirect    Estimated Blood Loss:  5 ml        Drains: None        Total IV Fluids:  See anesthesia        Specimens: None    Complications:  None        Disposition: PACU          Lopez Waite MD

## 2024-07-17 NOTE — ANESTHESIA CARE TRANSFER NOTE
Patient: Levar Sesay    Procedure: Procedure(s):  HERNIORRHAPHY, INGUINAL, ROBOT-ASSISTED, LAPAROSCOPIC, USING DA YODIT XI - right       Diagnosis: Non-recurrent unilateral inguinal hernia without obstruction or gangrene [K40.90]  Diagnosis Additional Information: No value filed.    Anesthesia Type:   General     Note:    Oropharynx: oropharynx clear of all foreign objects  Level of Consciousness: drowsy      Independent Airway: airway patency satisfactory and stable  Dentition: dentition unchanged  Vital Signs Stable: post-procedure vital signs reviewed and stable  Report to RN Given: handoff report given  Patient transferred to: PACU    Handoff Report: Identifed the Patient, Identified the Reponsible Provider, Reviewed the pertinent medical history, Discussed the surgical course, Reviewed Intra-OP anesthesia mangement and issues during anesthesia, Set expectations for post-procedure period and Allowed opportunity for questions and acknowledgement of understanding      Vitals:  Vitals Value Taken Time   /79 07/17/24 1000   Temp 35.8  C (96.44  F) 07/17/24 1014   Pulse 79 07/17/24 1014   Resp 21 07/17/24 1014   SpO2 94 % 07/17/24 1014   Vitals shown include unfiled device data.    Electronically Signed By: SHYANNE Neal CRNA  July 17, 2024  12:04 PM  
None

## 2024-07-17 NOTE — ANESTHESIA PROCEDURE NOTES
Airway       Patient location during procedure: OR       Procedure Start/Stop Times: 7/17/2024 7:41 AM  Staff -        Other Anesthesia Staff: Vania Johnson       Performed By: SRNA  Consent for Airway        Urgency: elective  Indications and Patient Condition       Indications for airway management: drew-procedural       Induction type:intravenous       Mask difficulty assessment: 1 - vent by mask    Final Airway Details       Final airway type: endotracheal airway       Successful airway: ETT - single  Endotracheal Airway Details        ETT size (mm): 7.0       Cuffed: yes       Cuff volume (mL): 6       Successful intubation technique: video laryngoscopy       VL Blade Size: Haines 3       Grade View of Cords: 2       Adjucts: stylet       Position: Right       Measured from: gums/teeth       Secured at (cm): 22       Bite block used: None    Post intubation assessment        Placement verified by: capnometry and chest rise        Number of attempts at approach: 1       Secured with: tape       Ease of procedure: easy       Dentition: Intact and Unchanged    Medication(s) Administered   Medication Administration Time: 7/17/2024 7:41 AM

## 2024-08-05 ENCOUNTER — OFFICE VISIT (OUTPATIENT)
Dept: SURGERY | Facility: CLINIC | Age: 69
End: 2024-08-05
Payer: COMMERCIAL

## 2024-08-05 VITALS
OXYGEN SATURATION: 99 % | WEIGHT: 149.8 LBS | HEIGHT: 63 IN | DIASTOLIC BLOOD PRESSURE: 102 MMHG | HEART RATE: 76 BPM | BODY MASS INDEX: 26.54 KG/M2 | TEMPERATURE: 98.7 F | SYSTOLIC BLOOD PRESSURE: 178 MMHG

## 2024-08-05 DIAGNOSIS — Z87.19 S/P REPAIR OF INGUINAL HERNIA: Primary | ICD-10-CM

## 2024-08-05 DIAGNOSIS — Z98.890 S/P REPAIR OF INGUINAL HERNIA: Primary | ICD-10-CM

## 2024-08-05 PROCEDURE — 99024 POSTOP FOLLOW-UP VISIT: CPT | Performed by: STUDENT IN AN ORGANIZED HEALTH CARE EDUCATION/TRAINING PROGRAM

## 2024-08-05 ASSESSMENT — PAIN SCALES - GENERAL: PAINLEVEL: NO PAIN (1)

## 2024-08-05 NOTE — PATIENT INSTRUCTIONS
1. The patient is instructed to call the office if there is any increasing incisional pain, swelling, redness, drainage, or any other problems.   2. Ok to return to normal activities  3. Follow up in surgery clinic as needed if any further questions or concerns

## 2024-08-05 NOTE — PROGRESS NOTES
"Surgery Post-op Note  [facility] General Surgery  [address]  [place] [address]  T: [phone]  F: [phone]    Subjective:     Levar Sesay presents to the clinic after undergoing a robotic-assisted right inguinal hernia repair on 7/17/24.  Patient is here for follow up. The patient reports no further incisional pain.  Patient is currently tolerating a regular diet.  Patient states bowel habits are regular and soft. Patient denies significant nausea or vomiting.        Objective:     Vitals:   BP (!) 170/101   Pulse 76   Temp 98.7  F (37.1  C) (Tympanic)   Ht 1.6 m (5' 3\")   Wt 67.9 kg (149 lb 12.8 oz)   LMP 09/15/2003   SpO2 99%   BMI 26.54 kg/m     General Appearance:    Levar is a well-appearing female who is in no acute distress.   Cardiac:    RRR, extremities warm and well perfused    Pulmonary:  Nonlabored breathing on room air   Abdomen:    Soft, nontender, nondistended. No appreciable inguinal hernia on exam with valsalva   Incision: The incision sites are healing well. There are no signs of cellulitis or drainage.        Labs/Imaging:     No new labs/imaging       Pathology:     N/A     Assessment:     Ms.Michele LINA Sesay is status post robotic-assisted right inguinal hernia repair, doing well      Plan:   1. The patient is instructed to call the office if there is any increasing incisional pain, swelling, redness, drainage, or any other problems.   2. Ok to return to normal activities  3. Follow up in surgery clinic as needed if any further questions or concerns    Lopez Waite MD on 8/5/2024 at 1:51 PM     "

## 2024-08-05 NOTE — LETTER
"8/5/2024      Levar Sesay  [address] [address] [address]      Dear Colleague,    Thank you for referring your patient, Levar Sesay, to the [facility]. Please see a copy of my visit note below.    Surgery Post-op Note  [facility]  [address] [place] [address]  T: [phone]  F: [phone]    Subjective:     Levar Sesay presents to the clinic after undergoing a robotic-assisted right inguinal hernia repair on 7/17/24.  Patient is here for follow up. The patient reports no further incisional pain.  Patient is currently tolerating a regular diet.  Patient states bowel habits are regular and soft. Patient denies significant nausea or vomiting.        Objective:     Vitals:   BP (!) 170/101   Pulse 76   Temp 98.7  F (37.1  C) (Tympanic)   Ht 1.6 m (5' 3\")   Wt 67.9 kg (149 lb 12.8 oz)   LMP 09/15/2003   SpO2 99%   BMI 26.54 kg/m     General Appearance:    Levar is a well-appearing female who is in no acute distress.   Cardiac:    RRR, extremities warm and well perfused    Pulmonary:  Nonlabored breathing on room air   Abdomen:    Soft, nontender, nondistended. No appreciable inguinal hernia on exam with valsalva   Incision: The incision sites are healing well. There are no signs of cellulitis or drainage.        Labs/Imaging:     No new labs/imaging       Pathology:     N/A     Assessment:     Ms.Michele LINA Sesay is status post robotic-assisted right inguinal hernia repair, doing well      Plan:   1. The patient is instructed to call the office if there is any increasing incisional pain, swelling, redness, drainage, or any other problems.   2. Ok to return to normal activities  3. Follow up in surgery clinic as needed if any further questions or concerns    Lopez Waite MD on 8/5/2024 at 1:51 PM       Again, thank you for allowing me to participate in the care of your patient.        Sincerely,        Lopez Waite, " MD

## 2024-08-21 ENCOUNTER — TELEPHONE (OUTPATIENT)
Dept: FAMILY MEDICINE | Facility: CLINIC | Age: 69
End: 2024-08-21
Payer: COMMERCIAL

## 2024-08-21 NOTE — TELEPHONE ENCOUNTER
Pt reports swollen eyelid that is itchy.has been swelling up and down for the last 1.5 weeks.  Looked for appts today. None avail. [facility] could see tomorrow in [facility]. Pt will go to       Levar Dlilard RN

## 2024-11-20 ENCOUNTER — OFFICE VISIT (OUTPATIENT)
Dept: FAMILY MEDICINE | Facility: CLINIC | Age: 69
End: 2024-11-20
Payer: COMMERCIAL

## 2024-11-20 ENCOUNTER — ANCILLARY PROCEDURE (OUTPATIENT)
Dept: GENERAL RADIOLOGY | Facility: CLINIC | Age: 69
End: 2024-11-20
Attending: FAMILY MEDICINE
Payer: COMMERCIAL

## 2024-11-20 VITALS
OXYGEN SATURATION: 98 % | HEART RATE: 78 BPM | BODY MASS INDEX: 26.58 KG/M2 | HEIGHT: 63 IN | RESPIRATION RATE: 20 BRPM | DIASTOLIC BLOOD PRESSURE: 90 MMHG | TEMPERATURE: 99.4 F | WEIGHT: 150 LBS | SYSTOLIC BLOOD PRESSURE: 156 MMHG

## 2024-11-20 DIAGNOSIS — J01.90 ACUTE SINUSITIS WITH SYMPTOMS > 10 DAYS: ICD-10-CM

## 2024-11-20 DIAGNOSIS — Z23 NEED FOR TDAP VACCINATION: ICD-10-CM

## 2024-11-20 DIAGNOSIS — M25.561 ACUTE PAIN OF RIGHT KNEE: Primary | ICD-10-CM

## 2024-11-20 DIAGNOSIS — Z23 NEED FOR SHINGLES VACCINE: ICD-10-CM

## 2024-11-20 DIAGNOSIS — M25.561 ACUTE PAIN OF RIGHT KNEE: ICD-10-CM

## 2024-11-20 DIAGNOSIS — Z29.11 NEED FOR VACCINATION AGAINST RESPIRATORY SYNCYTIAL VIRUS: ICD-10-CM

## 2024-11-20 PROCEDURE — 73562 X-RAY EXAM OF KNEE 3: CPT | Mod: TC | Performed by: RADIOLOGY

## 2024-11-20 PROCEDURE — 99214 OFFICE O/P EST MOD 30 MIN: CPT | Performed by: FAMILY MEDICINE

## 2024-11-20 RX ORDER — NAPROXEN 500 MG/1
500 TABLET ORAL 2 TIMES DAILY WITH MEALS
Qty: 30 TABLET | Refills: 0 | Status: SHIPPED | OUTPATIENT
Start: 2024-11-20

## 2024-11-20 RX ORDER — DOXYCYCLINE HYCLATE 100 MG
100 TABLET ORAL 2 TIMES DAILY
Qty: 20 TABLET | Refills: 0 | Status: SHIPPED | OUTPATIENT
Start: 2024-11-20 | End: 2024-11-30

## 2024-11-20 ASSESSMENT — PAIN SCALES - GENERAL: PAINLEVEL_OUTOF10: MODERATE PAIN (4)

## 2024-11-20 NOTE — PROGRESS NOTES
"  Assessment & Plan   Acute pain of right knee  Patient complains of right knee injury 3 days ago accompanied with pain and swelling since then.  On examination of the right knee: There is tenderness of the knee with swelling more on the medical side. The is tenderness during flexion, internal and external rotation. Popliteal pulse is felt. There is no loss of sensation.   Xray knee is ordered to evaluate the same.   Naproxen prescribed for the pain. Patient advised to drink lots of water along with the medication  Resting , elevation and ice compression are advised  Orthopedic referral is placed  to evaluate the same.  - XR Knee Right 3 Views; Future  - naproxen (NAPROSYN) 500 MG tablet; Take 1 tablet (500 mg) by mouth 2 times daily (with meals).  - Orthopedic  Referral; Future    Acute sinusitis with symptoms > 10 days  Patient complains of nasal congestion and sinus pain for more than 10 days.  She does not complain of fever  On examination : there is tenderness over her maxillary and frontal sinuses with nasal congestion.  Differential are discussed with the patient suggestive of acute sinus infection  Antibiotics doxycycline are prescribed for the same.  - doxycycline hyclate (VIBRA-TABS) 100 MG tablet; Take 1 tablet (100 mg) by mouth 2 times daily for 10 days.      BMI  Estimated body mass index is 26.57 kg/m  as calculated from the following:    Height as of this encounter: 1.6 m (5' 3\").    Weight as of this encounter: 68 kg (150 lb).       Masoud Cristobal is a 69 year old, presenting for the following health issues:  Knee Pain and Sinus Problem        11/20/2024    12:51 PM   Additional Questions   Roomed by Diana VELEZ   Accompanied by self     Knee Pain    Sinus Problem     History of Present Illness       Reason for visit:  Hurt my knee  Symptom onset:  1-3 days ago  Symptoms include:  Pain in my knee  Symptom intensity:  Moderate  Symptom progression:  Staying the same  Had these symptoms " "before:  No  What makes it worse:  Bending my knee  What makes it better:  Sitting with it elevated   She is taking medications regularly.       Acute Illness  Acute illness concerns: Sinus  Onset/Duration: >7 days  Symptoms:  Fever: No  Chills/Sweats: yes  Headache (location?): YES  Sinus Pressure: Yes  Conjunctivitis:  No  Ear Pain: YES-   Rhinorrhea: YES  Congestion: YES  Sore Throat: YES  Cough: YES  Wheeze: No  Decreased Appetite: No  Nausea: No  Vomiting: No  Diarrhea: No  Dysuria/Freq.: No  Dysuria or Hematuria: No  Fatigue/Achiness: YES  Sick/Strep Exposure:   Therapies tried and outcome: Tylenol, musinex and alkaseltzer  Home COVID-19 test were negative    Review of Systems  Constitutional, neuro, ENT, endocrine, pulmonary, cardiac, gastrointestinal, genitourinary, musculoskeletal, integument and psychiatric systems are negative, except as otherwise noted.      Objective    BP (!) 156/90   Pulse 78   Temp 99.4  F (37.4  C) (Tympanic)   Resp 20   Ht 1.6 m (5' 3\")   Wt 68 kg (150 lb)   LMP 09/15/2003   SpO2 98%   BMI 26.57 kg/m    Body mass index is 26.57 kg/m .  Physical Exam   GENERAL: alert and no distress  HENT:  there is tenderness over the maxillary and frontal sinus, ear canals and TM's normal, nose and mouth without ulcers or lesions  NECK: no adenopathy, no asymmetry, masses, or scars  RESP: lungs clear to auscultation - no rales, rhonchi or wheezes  CV: regular rate and rhythm, normal S1 S2, no S3 or S4, no murmur, click or rub, no peripheral edema  MS: no gross musculoskeletal defects noted, no edema  Right knee:  on inspection of the knee there is erythema present, no bruising or deformity seen. On palpation:  The knee is warm to touch, tenderness along with  swelling more on the medical side is present. limited ROM during flexion, internal and external rotation. Popliteal pulse is felt. There is no loss of sensation Left knee is normal  NEURO: Normal strength and tone, mentation intact " and speech normal  PSYCH: mentation appears normal, affect normal/bright     Brenna Teresa PA- Student       I have reviewed today's vital signs, notes, medications, labs and imaging. I have also seen and examined the patient and agree with the findings and plan as outlined above.     Signed Electronically by: Thad Goodwin MD

## 2024-11-20 NOTE — NURSING NOTE
"Chief Complaint   Patient presents with    Knee Pain    Sinus Problem       Initial BP (!) 156/90   Pulse 78   Temp 99.4  F (37.4  C) (Tympanic)   Resp 20   Ht 1.6 m (5' 3\")   Wt 68 kg (150 lb)   LMP 09/15/2003   SpO2 98%   BMI 26.57 kg/m   Estimated body mass index is 26.57 kg/m  as calculated from the following:    Height as of this encounter: 1.6 m (5' 3\").    Weight as of this encounter: 68 kg (150 lb).    Patient presents to the clinic using No DME    Is there anyone who you would like to be able to receive your results? No  If yes have patient fill out SHANKAR      "

## 2025-06-04 ENCOUNTER — HOSPITAL ENCOUNTER (OUTPATIENT)
Dept: MAMMOGRAPHY | Facility: CLINIC | Age: 70
Discharge: HOME | End: 2025-06-04
Attending: NURSE PRACTITIONER
Payer: COMMERCIAL

## 2025-06-04 DIAGNOSIS — Z12.31 VISIT FOR SCREENING MAMMOGRAM: ICD-10-CM

## 2025-06-04 PROCEDURE — 77067 SCR MAMMO BI INCL CAD: CPT

## 2025-06-21 ENCOUNTER — HEALTH MAINTENANCE LETTER (OUTPATIENT)
Age: 70
End: 2025-06-21

## 2025-07-27 DIAGNOSIS — M81.0 AGE-RELATED OSTEOPOROSIS WITHOUT CURRENT PATHOLOGICAL FRACTURE: ICD-10-CM

## 2025-07-29 RX ORDER — ALENDRONATE SODIUM 70 MG/1
70 TABLET ORAL
Qty: 4 TABLET | Refills: 0 | Status: SHIPPED | OUTPATIENT
Start: 2025-07-29

## 2025-08-23 DIAGNOSIS — M81.0 AGE-RELATED OSTEOPOROSIS WITHOUT CURRENT PATHOLOGICAL FRACTURE: ICD-10-CM

## 2025-08-25 RX ORDER — ALENDRONATE SODIUM 70 MG/1
70 TABLET ORAL
Qty: 4 TABLET | Refills: 0 | Status: SHIPPED | OUTPATIENT
Start: 2025-08-25

## (undated) DEVICE — GLOVE PROTEXIS W/NEU-THERA 7.5  2D73TE75

## (undated) DEVICE — SUCTION TIP YANKAUER STR K87

## (undated) DEVICE — GLOVE BIOGEL PI MICRO SZ 7.0 48570

## (undated) DEVICE — DRAPE TIBURON GENERAL ENDOSCOPY 9458

## (undated) DEVICE — PACK LAP TRANSVERSE STD

## (undated) DEVICE — DECANTER VIAL 2006S

## (undated) DEVICE — SU VICRYL 3-0 SH 27" J316H

## (undated) DEVICE — SYR 30ML SLIP TIP W/O NDL 302833

## (undated) DEVICE — LIGHT HANDLE X2

## (undated) DEVICE — SPONGE RAY-TEC 4X8" 7318

## (undated) DEVICE — SU DERMABOND ADVANCED .7ML DNX12

## (undated) DEVICE — DAVINCI XI MONOPOLAR SCISSORS HOT SHEARS 8MM 470179

## (undated) DEVICE — DRAPE U SPLIT 74X120" 29440

## (undated) DEVICE — BASIN SET MINOR DISP

## (undated) DEVICE — DRSG KERLIX 4 1/2"X4YDS ROLL 6730

## (undated) DEVICE — DAVINCI XI DRAPE COLUMN 470341

## (undated) DEVICE — SOL WATER IRRIG 1000ML BOTTLE 2F7114

## (undated) DEVICE — SUPPORTER ATHLETIC LG LATEX 202636

## (undated) DEVICE — DAVINCI HOT SHEARS TIP COVER  400180

## (undated) DEVICE — SU MONOCRYL 4-0 PS-2 18" UND Y496G

## (undated) DEVICE — DAVINCI XI ESU FORCE BIPOLAR 8MM 471405

## (undated) DEVICE — STOCKING SLEEVE COMPRESSION CALF MED

## (undated) DEVICE — DAVINCI XI SEAL UNIVERSAL 5-12MM 470500

## (undated) DEVICE — NDL 22GA 1.5"

## (undated) DEVICE — KIT PATIENT POSITIONING PIGAZZI LATEX FREE 40580

## (undated) DEVICE — DAVINCI XI DRIVER NDL MEGA SUTURECUT 8MM EXT 471309

## (undated) DEVICE — TUBING SUCTION 12"X1/4" N612

## (undated) DEVICE — ENDO TROCAR FIRST ENTRY KII FIOS Z-THRD 05X100MM CTF03

## (undated) DEVICE — FILTER LAPROSHIELD SMOKE EVAC LSF1

## (undated) DEVICE — LABEL MEDICATION SYSTEM  3304

## (undated) DEVICE — DAVINCI XI DRAPE ARM 470015

## (undated) DEVICE — SUCTION MANIFOLD NEPTUNE 2 SYS 1 PORT 702-025-000

## (undated) DEVICE — SU WND CLOSURE VLOC 180 ABS 3-0 6" V-20 VLOCL0604

## (undated) DEVICE — SOL WATER IRRIG 1000ML BOTTLE 07139-09

## (undated) DEVICE — SYR BULB IRRIG DOVER 60 ML LATEX FREE 67000

## (undated) DEVICE — PREP CHLORAPREP 26ML TINTED ORANGE  260815

## (undated) DEVICE — ADH SKIN CLOSURE PREMIERPRO EXOFIN 1.0ML 3470

## (undated) DEVICE — CATH IV ANGIO INTRO 14GA X 1 3/4" 381467

## (undated) DEVICE — SU VICRYL 3-0 SH 27" UND J416H

## (undated) DEVICE — GLOVE BIOGEL PI MICRO INDICATOR UNDERGLOVE SZ 7.5 48975

## (undated) DEVICE — DAVINCI XI OBTURATOR BLADELESS 8MM 470359

## (undated) DEVICE — BLADE KNIFE SURG 15 371115

## (undated) DEVICE — Device

## (undated) DEVICE — SU SILK 2-0 FS-1 18" 685G

## (undated) DEVICE — SYR 10ML FINGER CONTROL W/O NDL 309695

## (undated) DEVICE — ANTIFOG SOLUTION SEE SHARP 150M TROCAR SWABS 30978

## (undated) DEVICE — GOWN XLG DISP 9545

## (undated) DEVICE — GLOVE PROTEXIS BLUE W/NEU-THERA 8.0  2D73EB80

## (undated) RX ORDER — CEFAZOLIN SODIUM/WATER 2 G/20 ML
SYRINGE (ML) INTRAVENOUS
Status: DISPENSED
Start: 2022-09-12

## (undated) RX ORDER — FENTANYL CITRATE-0.9 % NACL/PF 10 MCG/ML
PLASTIC BAG, INJECTION (ML) INTRAVENOUS
Status: DISPENSED
Start: 2022-09-12

## (undated) RX ORDER — CEFAZOLIN SODIUM/WATER 2 G/20 ML
SYRINGE (ML) INTRAVENOUS
Status: DISPENSED
Start: 2024-07-17

## (undated) RX ORDER — HEPARIN SODIUM 5000 [USP'U]/.5ML
INJECTION, SOLUTION INTRAVENOUS; SUBCUTANEOUS
Status: DISPENSED
Start: 2024-07-17

## (undated) RX ORDER — LIDOCAINE HYDROCHLORIDE AND EPINEPHRINE 10; 10 MG/ML; UG/ML
INJECTION, SOLUTION INFILTRATION; PERINEURAL
Status: DISPENSED
Start: 2022-09-12

## (undated) RX ORDER — MAGNESIUM SULFATE HEPTAHYDRATE 40 MG/ML
INJECTION, SOLUTION INTRAVENOUS
Status: DISPENSED
Start: 2022-09-12

## (undated) RX ORDER — FENTANYL CITRATE 50 UG/ML
INJECTION, SOLUTION INTRAMUSCULAR; INTRAVENOUS
Status: DISPENSED
Start: 2022-09-12

## (undated) RX ORDER — PROPOFOL 10 MG/ML
INJECTION, EMULSION INTRAVENOUS
Status: DISPENSED
Start: 2022-09-12

## (undated) RX ORDER — PROPOFOL 10 MG/ML
INJECTION, EMULSION INTRAVENOUS
Status: DISPENSED
Start: 2024-07-17

## (undated) RX ORDER — DEXAMETHASONE SODIUM PHOSPHATE 4 MG/ML
INJECTION, SOLUTION INTRA-ARTICULAR; INTRALESIONAL; INTRAMUSCULAR; INTRAVENOUS; SOFT TISSUE
Status: DISPENSED
Start: 2024-07-17

## (undated) RX ORDER — LIDOCAINE HYDROCHLORIDE 10 MG/ML
INJECTION, SOLUTION EPIDURAL; INFILTRATION; INTRACAUDAL; PERINEURAL
Status: DISPENSED
Start: 2024-07-17

## (undated) RX ORDER — BUPIVACAINE HYDROCHLORIDE 5 MG/ML
INJECTION, SOLUTION EPIDURAL; INTRACAUDAL
Status: DISPENSED
Start: 2022-09-12

## (undated) RX ORDER — ACETAMINOPHEN 325 MG/1
TABLET ORAL
Status: DISPENSED
Start: 2024-07-17

## (undated) RX ORDER — FENTANYL CITRATE-0.9 % NACL/PF 10 MCG/ML
PLASTIC BAG, INJECTION (ML) INTRAVENOUS
Status: DISPENSED
Start: 2024-07-17

## (undated) RX ORDER — METHOCARBAMOL 500 MG/1
TABLET, FILM COATED ORAL
Status: DISPENSED
Start: 2024-07-17

## (undated) RX ORDER — ACETAMINOPHEN 325 MG/1
TABLET ORAL
Status: DISPENSED
Start: 2022-09-12

## (undated) RX ORDER — BUPIVACAINE HYDROCHLORIDE 5 MG/ML
INJECTION, SOLUTION EPIDURAL; INTRACAUDAL
Status: DISPENSED
Start: 2024-07-17

## (undated) RX ORDER — FENTANYL CITRATE 50 UG/ML
INJECTION, SOLUTION INTRAMUSCULAR; INTRAVENOUS
Status: DISPENSED
Start: 2024-07-17

## (undated) RX ORDER — LIDOCAINE HYDROCHLORIDE 20 MG/ML
JELLY TOPICAL
Status: DISPENSED
Start: 2022-09-12

## (undated) RX ORDER — LIDOCAINE HYDROCHLORIDE AND EPINEPHRINE 10; 10 MG/ML; UG/ML
INJECTION, SOLUTION INFILTRATION; PERINEURAL
Status: DISPENSED
Start: 2024-07-17

## (undated) RX ORDER — OXYCODONE HYDROCHLORIDE 5 MG/1
TABLET ORAL
Status: DISPENSED
Start: 2024-07-17